# Patient Record
Sex: FEMALE | Race: BLACK OR AFRICAN AMERICAN | NOT HISPANIC OR LATINO | Employment: FULL TIME | URBAN - METROPOLITAN AREA
[De-identification: names, ages, dates, MRNs, and addresses within clinical notes are randomized per-mention and may not be internally consistent; named-entity substitution may affect disease eponyms.]

---

## 2020-02-18 ENCOUNTER — TELEPHONE (OUTPATIENT)
Dept: FAMILY MEDICINE | Facility: CLINIC | Age: 50
End: 2020-02-18

## 2020-02-18 ENCOUNTER — OFFICE VISIT (OUTPATIENT)
Dept: FAMILY MEDICINE | Facility: CLINIC | Age: 50
End: 2020-02-18
Payer: MEDICARE

## 2020-02-18 ENCOUNTER — HOSPITAL ENCOUNTER (OUTPATIENT)
Dept: RADIOLOGY | Facility: HOSPITAL | Age: 50
Discharge: HOME | End: 2020-02-18
Attending: FAMILY MEDICINE
Payer: MEDICARE

## 2020-02-18 VITALS
BODY MASS INDEX: 31.95 KG/M2 | HEIGHT: 63 IN | HEART RATE: 78 BPM | SYSTOLIC BLOOD PRESSURE: 138 MMHG | OXYGEN SATURATION: 99 % | WEIGHT: 180.31 LBS | DIASTOLIC BLOOD PRESSURE: 86 MMHG

## 2020-02-18 DIAGNOSIS — E78.5 HYPERLIPIDEMIA, UNSPECIFIED HYPERLIPIDEMIA TYPE: ICD-10-CM

## 2020-02-18 DIAGNOSIS — Z72.0 TOBACCO USE: ICD-10-CM

## 2020-02-18 DIAGNOSIS — Z12.31 ENCOUNTER FOR SCREENING MAMMOGRAM FOR BREAST CANCER: ICD-10-CM

## 2020-02-18 DIAGNOSIS — Z11.59 ENCOUNTER FOR HEPATITIS C SCREENING TEST FOR LOW RISK PATIENT: ICD-10-CM

## 2020-02-18 DIAGNOSIS — Z79.899 MEDICATION MANAGEMENT: ICD-10-CM

## 2020-02-18 DIAGNOSIS — Z11.4 SCREENING FOR HIV (HUMAN IMMUNODEFICIENCY VIRUS): ICD-10-CM

## 2020-02-18 DIAGNOSIS — Z86.69 HISTORY OF MIGRAINE HEADACHES: ICD-10-CM

## 2020-02-18 DIAGNOSIS — Z86.73 HISTORY OF CEREBROVASCULAR ACCIDENT: ICD-10-CM

## 2020-02-18 DIAGNOSIS — I67.5 MOYAMOYA: Primary | ICD-10-CM

## 2020-02-18 PROBLEM — Z30.2 ENCOUNTER FOR TUBAL LIGATION: Status: ACTIVE | Noted: 2020-02-18

## 2020-02-18 PROCEDURE — 99999 PR PBB SHADOW E&M-NEW PATIENT-LVL IV: ICD-10-PCS | Mod: PBBFAC,,, | Performed by: FAMILY MEDICINE

## 2020-02-18 PROCEDURE — 3008F BODY MASS INDEX DOCD: CPT | Mod: CPTII,S$GLB,, | Performed by: FAMILY MEDICINE

## 2020-02-18 PROCEDURE — 99999 PR PBB SHADOW E&M-NEW PATIENT-LVL IV: CPT | Mod: PBBFAC,,, | Performed by: FAMILY MEDICINE

## 2020-02-18 PROCEDURE — 77067 SCR MAMMO BI INCL CAD: CPT | Mod: TC

## 2020-02-18 PROCEDURE — 99204 OFFICE O/P NEW MOD 45 MIN: CPT | Mod: S$GLB,,, | Performed by: FAMILY MEDICINE

## 2020-02-18 PROCEDURE — 3008F PR BODY MASS INDEX (BMI) DOCUMENTED: ICD-10-PCS | Mod: CPTII,S$GLB,, | Performed by: FAMILY MEDICINE

## 2020-02-18 PROCEDURE — 77067 MAMMO DIGITAL SCREENING BILAT WITH TOMOSYNTHESIS_CAD: ICD-10-PCS | Mod: 26,,, | Performed by: RADIOLOGY

## 2020-02-18 PROCEDURE — 77063 MAMMO DIGITAL SCREENING BILAT WITH TOMOSYNTHESIS_CAD: ICD-10-PCS | Mod: 26,,, | Performed by: RADIOLOGY

## 2020-02-18 PROCEDURE — 77063 BREAST TOMOSYNTHESIS BI: CPT | Mod: 26,,, | Performed by: RADIOLOGY

## 2020-02-18 PROCEDURE — 99204 PR OFFICE/OUTPT VISIT, NEW, LEVL IV, 45-59 MIN: ICD-10-PCS | Mod: S$GLB,,, | Performed by: FAMILY MEDICINE

## 2020-02-18 PROCEDURE — 77067 SCR MAMMO BI INCL CAD: CPT | Mod: 26,,, | Performed by: RADIOLOGY

## 2020-02-18 NOTE — PROGRESS NOTES
"Subjective:       Patient ID: Darwin Beltrán is a 49 y.o. female.    Chief Complaint: Establish Care    50 yo F pt, new to me, with PMH significant for cerebrovascular MoyaMoya diagnosed in 2011. She presents to [facility]. She was last evaluated by a PCP at [facility] 9/2/2014. Per previous pcp, she's had multiple previous strokes. Previously followed by neurology and vascular surgery--pt stopped following up after she was denied disability. States her PCP completed paperwork advising disability status. Previous PCP documented that has a barely noticeable left facial droop which she is self-conscious of and bothers her, right hand contracture and some atrophy, and visual, cognition, and speech difficulties as consequences of prior strokes. Records from her neurologist were thoroughly reviewed including hypercoagulable workup etc. and all was normal other than vascular abnormalities consistent with moyamoya disease. She had no large vessel disease amenable to stent or bypasses and nothing to suggest embolic phenomenon. She was previously treated with plavix 75 mg hs, ASA 81 mg daily, citalopram 20 mg daily, Folic acid, and pantoprazole 40 mg daily. She has not had any of these medications in > 5 years. Her main concern today is migraine headaches---states she was treated with "oral botox" in the past.    Review of Systems   Constitutional: Negative for activity change, appetite change, chills, fever and unexpected weight change.   HENT: Negative for congestion, sneezing and sore throat.    Eyes: Negative for redness, itching and visual disturbance.   Respiratory: Negative for cough, chest tightness, shortness of breath and wheezing.    Cardiovascular: Negative for chest pain.   Gastrointestinal: Negative for abdominal pain, constipation, diarrhea, nausea and vomiting.   Genitourinary: Negative for dysuria, frequency, hematuria, urgency, vaginal bleeding and vaginal discharge.   Skin: Negative for rash.   Neurological: " "Negative for dizziness, syncope and headaches.   Psychiatric/Behavioral: Negative for dysphoric mood and suicidal ideas. The patient is not nervous/anxious.          Past Medical History:   Diagnosis Date    2011       Patient Active Problem List   Diagnosis    Hyperlipidemia    Momo    History of cerebrovascular accident    Tobacco use    History of migraine headaches       Past Surgical History:   Procedure Laterality Date    BILATERAL TUBAL LIGATION      ORIF TIBIA & FIBULA FRACTURES Right 2004    Hardware in Place       Family History   Problem Relation Age of Onset    Breast cancer Mother 57         in 60s    Prostate cancer Father     Hypertension Father     Lung cancer Brother     Colon cancer Brother        Social History     Tobacco Use   Smoking Status Current Every Day Smoker    Types: Cigarettes       Social History     Social History Narrative    Tobacco: Initiated in 20s; 1ppd    EtOH: Occasional; few times per year; has max use of 3 drinks at once    Drug: None    Employment: Unemployed. Receives disability for medical condition ()    Education: Bachelor's Degree     Lives with 3 children              Objective:        Vitals:    20 1115   BP: 138/86   Pulse: 78   SpO2: 99%   Weight: 81.8 kg (180 lb 5.4 oz)   Height: 5' 3" (1.6 m)       Physical Exam   Constitutional: She is oriented to person, place, and time. She appears well-developed and well-nourished. No distress.   HENT:   Head: Normocephalic and atraumatic.   Right Ear: External ear normal.   Left Ear: External ear normal.   Nose: Nose normal.   Mouth/Throat: Oropharynx is clear and moist. No oropharyngeal exudate.   Eyes: Conjunctivae and EOM are normal. No scleral icterus.   Neck: Normal range of motion. Neck supple.   Cardiovascular: Normal rate, regular rhythm and normal heart sounds. Exam reveals no gallop and no friction rub.   No murmur heard.  Pulmonary/Chest: Effort normal " and breath sounds normal. She has no wheezes. She has no rales.   Musculoskeletal: Normal range of motion. She exhibits no edema.   Lymphadenopathy:     She has no cervical adenopathy.   Neurological: She is alert and oriented to person, place, and time. She has normal strength. No cranial nerve deficit or sensory deficit.   Skin: Skin is warm and dry. No rash noted. No erythema.   Psychiatric: She has a normal mood and affect. Her behavior is normal.       Assessment:       1. Moyamoya    2. History of cerebrovascular accident    3. History of migraine headaches    4. Hyperlipidemia, unspecified hyperlipidemia type    5. Tobacco use    6. Screening for HIV (human immunodeficiency virus)    7. Encounter for hepatitis C screening test for low risk patient    8. Medication management    9. Encounter for screening mammogram for breast cancer        Plan:       Darwin was seen today for establish care.    Diagnoses and all orders for this visit:    Moyamoya  -     Ambulatory referral/consult to Neurology; Future  -     Ambulatory referral/consult to Vascular Surgery; Future    History of cerebrovascular accident  -     CBC auto differential; Future  -     Comprehensive metabolic panel; Future  -     Lipid panel; Future    History of migraine headaches    Hyperlipidemia, unspecified hyperlipidemia type    Tobacco use    Screening for HIV (human immunodeficiency virus)  -     HIV 1/2 Ag/Ab (4th Gen); Future    Encounter for hepatitis C screening test for low risk patient  -     Hepatitis C antibody; Future    Medication management  -     CBC auto differential; Future  -     Comprehensive metabolic panel; Future  -     Hemoglobin A1c; Future  -     Lipid panel; Future  -     TSH; Future  -     Vitamin D; Future    Encounter for screening mammogram for breast cancer  -     Cancel: Mammo Digital Screening Bilat; Future    Cynthia  --has had several CVAs in the past dating back to 2013  --She is now receiving disability  (states CVAs were stress-induced; by job)   --Plan for fasting labs as above  --Discuss ASA and Statin initiation on f/u visit  --Refer to vascular and neurology for further evaluation and management. (?) if pt needs DAPT    H/o Migraine Headaches  --Neurology evaluation given concomitant moyamoya disease     Tobacco Use  --Discuss tobacco cessation on f/u visit     HM   Hep C and HIV screens today   Vitamin D screen today   Refer for Mammogram screen today  Discuss Pap/PPSV-23/Tdap on f/u visit   Flu vaccine declined today 2/18/2020     F/U plan pending fasting lab results

## 2020-02-18 NOTE — TELEPHONE ENCOUNTER
Called to schedule referrals to Neurology & Vascular Surgery, left message with a male person to return call

## 2020-02-19 ENCOUNTER — TELEPHONE (OUTPATIENT)
Dept: NEUROLOGY | Facility: CLINIC | Age: 50
End: 2020-02-19

## 2020-02-19 NOTE — TELEPHONE ENCOUNTER
----- Message from Karen Wadsworth sent at 2/19/2020  8:29 AM CST -----  Patient has a referral to Neurology, can you please schedule patient, thank you

## 2020-02-24 ENCOUNTER — TELEPHONE (OUTPATIENT)
Dept: FAMILY MEDICINE | Facility: CLINIC | Age: 50
End: 2020-02-24

## 2020-02-24 NOTE — TELEPHONE ENCOUNTER
Labs 2/18/2020  Hep C and HIV NR     Vitamin D 5  --Needs ergocalciferol 50K U qweek x 16 weeks  --Repeat Vitamin D in 4 months     TSH 0.605    , , HDL 44, .0  Needs high intensity statin. Discuss rosuvastatin 20 mg hs with pt.    A1c 5.1    CMP wnl     CBC wnl    Will discuss results with pt in office  MD Arpan

## 2020-02-26 PROBLEM — Z86.73 HISTORY OF CEREBROVASCULAR ACCIDENT: Status: ACTIVE | Noted: 2020-02-26

## 2020-02-26 PROBLEM — I67.5 MOYAMOYA: Status: ACTIVE | Noted: 2020-02-26

## 2020-02-26 PROBLEM — Z72.0 TOBACCO USE: Status: ACTIVE | Noted: 2020-02-26

## 2020-02-26 PROBLEM — Z86.69 HISTORY OF MIGRAINE HEADACHES: Status: ACTIVE | Noted: 2020-02-26

## 2020-02-26 NOTE — TELEPHONE ENCOUNTER
Called patient to schedule follow up appointment to discuss lab results.  No answer, unable to leave voicemail. Will schedule follow up and send appt reminder to patient.

## 2020-02-27 DIAGNOSIS — Z86.73 HISTORY OF CEREBROVASCULAR ACCIDENT: Primary | ICD-10-CM

## 2020-02-27 DIAGNOSIS — I77.1 STRICTURE OF ARTERY: ICD-10-CM

## 2020-03-12 ENCOUNTER — HOSPITAL ENCOUNTER (OUTPATIENT)
Dept: VASCULAR SURGERY | Facility: CLINIC | Age: 50
Discharge: HOME | End: 2020-03-12
Attending: SURGERY
Payer: MEDICARE

## 2020-03-12 ENCOUNTER — INITIAL CONSULT (OUTPATIENT)
Dept: VASCULAR SURGERY | Facility: CLINIC | Age: 50
End: 2020-03-12
Attending: SURGERY
Payer: MEDICARE

## 2020-03-12 VITALS
HEART RATE: 70 BPM | TEMPERATURE: 99 F | WEIGHT: 174.19 LBS | BODY MASS INDEX: 30.86 KG/M2 | HEIGHT: 63 IN | DIASTOLIC BLOOD PRESSURE: 85 MMHG | SYSTOLIC BLOOD PRESSURE: 140 MMHG

## 2020-03-12 DIAGNOSIS — I67.5 MOYAMOYA: ICD-10-CM

## 2020-03-12 DIAGNOSIS — Z86.73 HISTORY OF CEREBROVASCULAR ACCIDENT: ICD-10-CM

## 2020-03-12 DIAGNOSIS — I77.1 STRICTURE OF ARTERY: ICD-10-CM

## 2020-03-12 DIAGNOSIS — I65.29 ASYMPTOMATIC CAROTID ARTERY STENOSIS, UNSPECIFIED LATERALITY: Primary | ICD-10-CM

## 2020-03-12 PROCEDURE — 99999 PR PBB SHADOW E&M-EST. PATIENT-LVL III: ICD-10-PCS | Mod: PBBFAC,,, | Performed by: SURGERY

## 2020-03-12 PROCEDURE — 99499 RISK ADDL DX/OHS AUDIT: ICD-10-PCS | Mod: S$GLB,,, | Performed by: SURGERY

## 2020-03-12 PROCEDURE — 93880 PR DUPLEX SCAN EXTRACRANIAL,BILAT: ICD-10-PCS | Mod: S$GLB,,, | Performed by: SURGERY

## 2020-03-12 PROCEDURE — 99204 OFFICE O/P NEW MOD 45 MIN: CPT | Mod: S$GLB,,, | Performed by: SURGERY

## 2020-03-12 PROCEDURE — 99499 UNLISTED E&M SERVICE: CPT | Mod: S$GLB,,, | Performed by: SURGERY

## 2020-03-12 PROCEDURE — 3008F PR BODY MASS INDEX (BMI) DOCUMENTED: ICD-10-PCS | Mod: CPTII,S$GLB,, | Performed by: SURGERY

## 2020-03-12 PROCEDURE — 99999 PR PBB SHADOW E&M-EST. PATIENT-LVL III: CPT | Mod: PBBFAC,,, | Performed by: SURGERY

## 2020-03-12 PROCEDURE — 3008F BODY MASS INDEX DOCD: CPT | Mod: CPTII,S$GLB,, | Performed by: SURGERY

## 2020-03-12 PROCEDURE — 93880 EXTRACRANIAL BILAT STUDY: CPT | Mod: S$GLB,,, | Performed by: SURGERY

## 2020-03-12 PROCEDURE — 99204 PR OFFICE/OUTPT VISIT, NEW, LEVL IV, 45-59 MIN: ICD-10-PCS | Mod: S$GLB,,, | Performed by: SURGERY

## 2020-03-12 NOTE — PROGRESS NOTES
[facility]  [facility]    SUBJECTIVE:     HISTORY OF PRESENT ILLNESS:  Darwin Beltrán is a 49 y.o. female with history significant  MoyaMoya disease (dx  at [facility]), CVA (most recent , residual right sided weakness), tobacco abuse, HTN, HLD who has been referred to surgery clinic for evaluation of her moyamoya disease. Patient reports no recent stroke like symptoms. Only complaint is intermittent migraines. She takes Excedrin with migraines, no other medications. She has never been evaluated for carotid stenosis before, per patient.   Current smoker - 1ppd, 17 years    Carotid u/s 3/12/20 showed 1-39% stenosis bilaterally.    MEDICATIONS:  Home Medications:  No current outpatient medications on file prior to visit.     No current facility-administered medications on file prior to visit.        ALLERGIES:    Review of patient's allergies indicates:  No Known Allergies    PAST MEDICAL HISTORY:    Past Medical History:   Diagnosis Date    Moyamoya        SURGICAL HISTORY:  Past Surgical History:   Procedure Laterality Date    BILATERAL TUBAL LIGATION  2009    ORIF TIBIA & FIBULA FRACTURES Right 2004    Hardware in Place       FAMILY HISTORY:  Family History   Problem Relation Age of Onset    Breast cancer Mother 57         in 60s    Prostate cancer Father     Hypertension Father     Lung cancer Brother     Colon cancer Brother        SOCIAL HISTORY:  Social History     Tobacco Use    Smoking status: Current Every Day Smoker     Types: Cigarettes   Substance Use Topics    Alcohol use: Not on file    Drug use: Not on file        REVIEW OF SYSTEMS:  Review of Systems   Constitutional: Negative for activity change, appetite change, chills, diaphoresis, fatigue and unexpected weight change.   Eyes: Negative for photophobia and visual disturbance.   Respiratory: Negative for cough and shortness of breath.    Cardiovascular: Negative for chest pain and palpitations.    Gastrointestinal: Negative for abdominal distention, nausea and vomiting.   Skin: Negative for color change and wound.   Neurological: Positive for weakness (right upper extremity) and headaches. Negative for dizziness.   Hematological: Negative for adenopathy. Does not bruise/bleed easily.       OBJECTIVE:     Most Recent Vitals:  Temp: 99.1 °F (37.3 °C) (03/12/20 0839)  Pulse: 70 (03/12/20 0839)  BP: (!) 140/85 (03/12/20 0839)    PHYSICAL EXAM:  Physical Exam   Constitutional: She is oriented to person, place, and time and well-developed, well-nourished, and in no distress.   HENT:   Head: Normocephalic and atraumatic.   Cardiovascular: Normal rate, regular rhythm and intact distal pulses.   Pulmonary/Chest: Effort normal. No respiratory distress.   Musculoskeletal: Normal range of motion.   Neurological: She is alert and oriented to person, place, and time.   Skin: Skin is warm and dry. She is not diaphoretic. No erythema.       LABORATORY VALUES:  Lab Results   Component Value Date    WBC 7.85 02/18/2020    HGB 13.5 02/18/2020    HCT 43.1 02/18/2020     02/18/2020    HGBA1C 5.1 02/18/2020     Lab Results   Component Value Date     02/18/2020    K 3.9 02/18/2020     02/18/2020    CO2 25 02/18/2020    BUN 8 02/18/2020    CREATININE 0.8 02/18/2020    GLU 78 02/18/2020    CALCIUM 9.6 02/18/2020    AST 11 02/18/2020    ALT 7 (L) 02/18/2020    ALKPHOS 113 02/18/2020    BILITOT 0.3 02/18/2020    PROT 7.3 02/18/2020    ALBUMIN 3.7 02/18/2020     No results found for: INR, PTT  Lab Results   Component Value Date    TSH 0.605 02/18/2020       DIAGNOSTIC STUDIES:  Carotid ultrasound 3/12: No evidence of signficant stenosis bilaterally 1-39$  ASSESSMENT:     Darwin Beltrán is a 49 y.o. female referred for evaluation of carotids in relation to diagnosis of moyamoya and history of CVA. No evidence of carotid stenosis on ultrasound 3/12/20    PLAN:  -No need for vascular surgery intervention. Recommend  continued work up and medical management per PCP.   -Recommend decreasing/stopping smoking, as it can worsen vascular disease   -Follow up PRN    L. Clare Flower MD   General Surgery- PGYIII  [phone]      STAFF ADDENDUM    I have reviewed the relevant data and the resident's assessment and agree with the findings and plan as outlined above.    Jorge Peña MD  Vascular & Endovascular Surgery

## 2020-03-12 NOTE — LETTER
March 12, 2020      Tadeo Pulliam MD  [address]  [address]  [facility] [address]           [facility] - Vascular Surgery  [address]  [place] [address]  Phone: [phone]  Fax: [phone]          Patient: Darwin Beltrán   MR Number: [id number]   YOB: 1970   Date of Visit: 3/12/2020       Dear Dr. Tadeo Pulliam:    Thank you for referring Darwin Beltrán to me for evaluation. Attached you will find relevant portions of my assessment and plan of care.    If you have questions, please do not hesitate to call me. I look forward to following Darwin Beltrán along with you.    Sincerely,    Jorge Peña MD    Enclosure  CC:  No Recipients    If you would like to receive this communication electronically, please contact [email or web address] or [phone] to request more information on [employer] access.    For providers and/or their staff who would like to refer a patient to [facility], please contact us through our one-stop-shop provider referral line, [facility], at [phone].    If you feel you have received this communication in error or would no longer like to receive these types of communications, please e-mail [email or web address]

## 2020-05-26 ENCOUNTER — OFFICE VISIT (OUTPATIENT)
Dept: FAMILY MEDICINE | Facility: CLINIC | Age: 50
End: 2020-05-26
Attending: FAMILY MEDICINE
Payer: MEDICARE

## 2020-05-26 ENCOUNTER — TELEPHONE (OUTPATIENT)
Dept: FAMILY MEDICINE | Facility: CLINIC | Age: 50
End: 2020-05-26

## 2020-05-26 VITALS
DIASTOLIC BLOOD PRESSURE: 86 MMHG | SYSTOLIC BLOOD PRESSURE: 148 MMHG | HEIGHT: 63 IN | BODY MASS INDEX: 31.36 KG/M2 | WEIGHT: 177 LBS | OXYGEN SATURATION: 98 % | HEART RATE: 90 BPM | TEMPERATURE: 98 F

## 2020-05-26 DIAGNOSIS — Z72.0 TOBACCO USE: ICD-10-CM

## 2020-05-26 DIAGNOSIS — B35.3 TINEA PEDIS OF RIGHT FOOT: Primary | ICD-10-CM

## 2020-05-26 PROCEDURE — 3008F BODY MASS INDEX DOCD: CPT | Mod: CPTII,S$GLB,, | Performed by: FAMILY MEDICINE

## 2020-05-26 PROCEDURE — 3008F PR BODY MASS INDEX (BMI) DOCUMENTED: ICD-10-PCS | Mod: CPTII,S$GLB,, | Performed by: FAMILY MEDICINE

## 2020-05-26 PROCEDURE — 99214 PR OFFICE/OUTPT VISIT, EST, LEVL IV, 30-39 MIN: ICD-10-PCS | Mod: S$GLB,,, | Performed by: FAMILY MEDICINE

## 2020-05-26 PROCEDURE — 99999 PR PBB SHADOW E&M-EST. PATIENT-LVL III: ICD-10-PCS | Mod: PBBFAC,,, | Performed by: FAMILY MEDICINE

## 2020-05-26 PROCEDURE — 99214 OFFICE O/P EST MOD 30 MIN: CPT | Mod: S$GLB,,, | Performed by: FAMILY MEDICINE

## 2020-05-26 PROCEDURE — 99999 PR PBB SHADOW E&M-EST. PATIENT-LVL III: CPT | Mod: PBBFAC,,, | Performed by: FAMILY MEDICINE

## 2020-05-26 RX ORDER — VARENICLINE TARTRATE 0.5 (11)-1
KIT ORAL
Qty: 1 PACKAGE | Refills: 0 | Status: SHIPPED | OUTPATIENT
Start: 2020-05-26 | End: 2020-06-20

## 2020-05-26 RX ORDER — CLOTRIMAZOLE AND BETAMETHASONE DIPROPIONATE 10; .64 MG/G; MG/G
CREAM TOPICAL 2 TIMES DAILY
Qty: 45 G | Refills: 1 | Status: SHIPPED | OUTPATIENT
Start: 2020-05-26 | End: 2022-03-09

## 2020-05-26 RX ORDER — TERBINAFINE HYDROCHLORIDE 250 MG/1
250 TABLET ORAL DAILY
Qty: 90 TABLET | Refills: 0 | Status: SHIPPED | OUTPATIENT
Start: 2020-05-26 | End: 2020-08-20

## 2020-05-26 NOTE — PATIENT INSTRUCTIONS
Athletes Foot    Athletes foot (tinea pedis) is caused by a fungal infection in the skin. It affects the skin between the toes, causing cracks in the skin called fissures. It can also affect the bottom of the foot where it causes dry white scales and peeling of the skin. This infection is more likely to occur when the foot is in hot, sweaty socks and shoes for long periods of time. You may feel itching and burning between your toes. This infection is treated with skin creams or medicine taken by mouth.  Home care  The following are general care guidelines:  · It is important to keep the feet dry. Use absorbent cotton socks and change them if they become sweaty. Or wear an open-toe shoe or sandal. Wash the feet at least once a day with soap and water.  · Apply the antifungal cream as prescribed. Some antifungal creams are available without a prescription.  · It may take a week before the rash starts to improve. It can take about 3 to 4 weeks to completely clear. Continue the medicine until the rash is all gone.  · Use over-the-counter antifungal powders or sprays on your feet after exposure to high-risk environments, such as public showers, gyms, and locker rooms. This can help prevent future infections. Wearing appropriate shoes in these situations can help.  Prevention  The following tips may help prevent athletes foot:  · Don't share shoes or socks with someone who has athlete's foot.  · Don't walk barefoot in places where a fungal infection can spread quickly such as locker rooms, showers, and swimming pools.  · Change socks regularly.  · Alternate shoes to assist in drying.  Follow-up care  Follow up with your healthcare provider as recommended if the rash does not improve after 10 days of treatment, or if the rash continues to spread.  When to seek medical care  Get medical attention right away if any of the following occur:  · Fever of 100.4°F (38°C) or higher, or as directed  · Increasing redness or  swelling of the foot  · Infection comes back soon after treatment  · Pus draining from cracks in the skin  Date Last Reviewed: 8/1/2016  © 4727-3425 [employer]. [address] [place] [address], [place] [address]. All rights reserved. This information is not intended as a substitute for professional medical care. Always follow your healthcare professional's instructions.

## 2020-05-26 NOTE — PROGRESS NOTES
Subjective:       Patient ID: Darwin Beltrán is a 49 y.o. female.    Chief Complaint: Foot Problem (right foot)    49 yr old pleasant female with HLD, tobacco use, and other co morbidities presents today for evaluation of right foot rash. Onset long time ago but got worse in the last one month. It oozes. She is been treated for fungal infection in the past. No systemic symptoms. Details as follows -    Tobacco use - she would like to quit    Rash   This is a chronic problem. The current episode started more than 1 month ago. The problem has been gradually worsening since onset. The affected locations include the right foot and right toes. The rash is characterized by dryness, peeling, scaling, redness, swelling and draining. Pertinent negatives include no anorexia, congestion, cough, eye pain, facial edema, fever, nail changes, rhinorrhea, shortness of breath, sore throat or vomiting. Past treatments include nothing. The treatment provided no relief.     Review of Systems   Constitutional: Negative.  Negative for activity change, diaphoresis, fever and unexpected weight change.   HENT: Negative.  Negative for congestion, ear discharge, hearing loss, rhinorrhea, sore throat and voice change.    Eyes: Negative.  Negative for pain, discharge and visual disturbance.   Respiratory: Negative.  Negative for cough, chest tightness, shortness of breath and wheezing.    Cardiovascular: Negative.  Negative for chest pain.   Gastrointestinal: Negative.  Negative for abdominal distention, anal bleeding, anorexia, constipation, nausea and vomiting.   Endocrine: Negative.  Negative for cold intolerance, polydipsia and polyuria.   Genitourinary: Negative.  Negative for decreased urine volume, difficulty urinating, dysuria, frequency, menstrual problem and vaginal pain.   Musculoskeletal: Negative.  Negative for arthralgias, gait problem and myalgias.   Skin: Positive for rash. Negative for color change, nail changes, pallor and  wound.   Allergic/Immunologic: Negative.  Negative for environmental allergies and immunocompromised state.   Neurological: Negative.  Negative for dizziness, tremors, seizures, speech difficulty and headaches.   Hematological: Negative.  Negative for adenopathy. Does not bruise/bleed easily.   Psychiatric/Behavioral: Negative.  Negative for agitation, confusion, decreased concentration, hallucinations, self-injury and suicidal ideas. The patient is not nervous/anxious.        PMH/PSH/FH/SH/MED/ALLERGY reviewed    Objective:       Vitals:    05/26/20 1413   BP: (!) 148/86   Pulse: 90   Temp: 98.2 °F (36.8 °C)       Physical Exam   Constitutional: She is oriented to person, place, and time. She appears well-developed and well-nourished. No distress.   HENT:   Head: Normocephalic and atraumatic.   Right Ear: External ear normal.   Left Ear: External ear normal.   Nose: Nose normal.   Mouth/Throat: Oropharynx is clear and moist. No oropharyngeal exudate.   Eyes: Pupils are equal, round, and reactive to light. Conjunctivae and EOM are normal. Right eye exhibits no discharge. Left eye exhibits no discharge. No scleral icterus.   Neck: Normal range of motion. Neck supple. No JVD present. No tracheal deviation present. No thyromegaly present.   Cardiovascular: Normal rate, regular rhythm, normal heart sounds and intact distal pulses. Exam reveals no gallop and no friction rub.   No murmur heard.  Pulmonary/Chest: Effort normal and breath sounds normal. No stridor. She has no wheezes. She has no rales. She exhibits no tenderness.   Abdominal: Soft. Bowel sounds are normal. She exhibits no distension and no mass. There is no tenderness. There is no rebound and no guarding. No hernia.   Musculoskeletal: Normal range of motion. She exhibits no edema or tenderness.   Lymphadenopathy:     She has no cervical adenopathy.   Neurological: She is alert and oriented to person, place, and time. She has normal reflexes. She displays  normal reflexes. No cranial nerve deficit. She exhibits normal muscle tone. Coordination normal.   Skin: Skin is warm and dry. Rash noted. She is not diaphoretic. There is erythema. No pallor.   Patchy, erythematous, raised, scaly rash with some cracks extending the sole of foot and involving all toes and dorsal foot   Psychiatric: She has a normal mood and affect. Her behavior is normal. Judgment and thought content normal.       Assessment:       1. Tinea pedis of right foot    2. Tobacco use        Plan:           Darwin was seen today for foot problem.    Diagnoses and all orders for this visit:    Tinea pedis of right foot  -     terbinafine HCL (LAMISIL) 250 mg tablet; Take 1 tablet (250 mg total) by mouth once daily.  -     clotrimazole-betamethasone 1-0.05% (LOTRISONE) cream; Apply topically 2 (two) times daily.    Tobacco use  -     varenicline (CHANTIX STARTING MONTH BOX) 0.5 mg (11)- 1 mg (42) tablet; Take one 0.5mg tab by mouth once daily X3 days,then increase to one 0.5mg tab twice daily X4 days,then increase to one 1mg tab twice daily      Tinea pedis  -lamisil x 3 months  -lotrisone as directed    Tobacco use  -chantix    Spent adequate time in obtaining history and explaining differentials    25 minutes spent during this visit of which greater than 50% devoted to face-face counseling and coordination of care regarding diagnosis and management plan    Follow up if symptoms worsen or fail to improve.

## 2020-06-11 ENCOUNTER — TELEPHONE (OUTPATIENT)
Dept: FAMILY MEDICINE | Facility: CLINIC | Age: 50
End: 2020-06-11

## 2020-06-11 NOTE — TELEPHONE ENCOUNTER
Called and notified patient that she can stop medication and monitor symptoms.  Patient verbalized understanding.

## 2020-06-11 NOTE — TELEPHONE ENCOUNTER
Spoke to pt and states that Lamisil 250mg QD is causing blisters on the bottom of her feet. Pt has not taken any Lamisil today. Pls advise.

## 2020-06-11 NOTE — TELEPHONE ENCOUNTER
----- Message from Zuleyma Hernandez sent at 6/11/2020 10:59 AM CDT -----  Contact: [phone]/Self   Patient is requesting to speak with nurse regarding medication terbinafine HCL (LAMISIL) 250 mg tablet not working. Please call and advise.

## 2020-06-22 ENCOUNTER — TELEPHONE (OUTPATIENT)
Dept: FAMILY MEDICINE | Facility: CLINIC | Age: 50
End: 2020-06-22

## 2020-06-22 NOTE — TELEPHONE ENCOUNTER
----- Message from Delilah Rosario sent at 6/22/2020 10:30 AM CDT -----  Contact: [phone]  Pt is requesting a callback in regards to needing to speak with the Nurse.    Please call

## 2020-06-22 NOTE — TELEPHONE ENCOUNTER
Patient states that she was taking Lamisil for athlete's foot.  States blisters began to form on the bottoms of her feet.  Patient offered first available to follow up on symptoms on 7/1/2020.  Patient will go to  to be seen sooner.

## 2020-06-23 ENCOUNTER — OFFICE VISIT (OUTPATIENT)
Dept: URGENT CARE | Facility: CLINIC | Age: 50
End: 2020-06-23
Payer: MEDICARE

## 2020-06-23 VITALS
BODY MASS INDEX: 30.22 KG/M2 | DIASTOLIC BLOOD PRESSURE: 95 MMHG | SYSTOLIC BLOOD PRESSURE: 157 MMHG | OXYGEN SATURATION: 98 % | HEART RATE: 85 BPM | HEIGHT: 64 IN | WEIGHT: 177 LBS | TEMPERATURE: 98 F

## 2020-06-23 DIAGNOSIS — L30.1 DYSHIDROTIC ECZEMA: Primary | ICD-10-CM

## 2020-06-23 PROCEDURE — 99213 PR OFFICE/OUTPT VISIT, EST, LEVL III, 20-29 MIN: ICD-10-PCS | Mod: S$GLB,,, | Performed by: NURSE PRACTITIONER

## 2020-06-23 PROCEDURE — 99213 OFFICE O/P EST LOW 20 MIN: CPT | Mod: S$GLB,,, | Performed by: NURSE PRACTITIONER

## 2020-06-23 RX ORDER — DOXYCYCLINE 100 MG/1
100 CAPSULE ORAL 2 TIMES DAILY
Qty: 20 CAPSULE | Refills: 0 | Status: SHIPPED | OUTPATIENT
Start: 2020-06-23 | End: 2020-07-03

## 2020-06-23 NOTE — PROGRESS NOTES
"Subjective:       Patient ID: Darwin Beltrán is a 49 y.o. female.    Vitals:  height is 5' 4" (1.626 m) and weight is 80.3 kg (177 lb). Her temperature is 97.5 °F (36.4 °C). Her blood pressure is 157/95 (abnormal) and her pulse is 85. Her oxygen saturation is 98%.     Chief Complaint: Recurrent Skin Infections    Ambulatory with c/o infection to feet and hands for one year. Her pcp was treating with terbinafine and clotrimazole with steroid topical but not improving. She states her feet hurt so bad this weekend. She tried to get in with pcp but no appt. Available.     Nail Problem  This is a recurrent problem. The current episode started more than 1 year ago. The problem has been gradually worsening. Pertinent negatives include no arthralgias, chest pain, chills, headaches, joint swelling, myalgias, nausea, vertigo or weakness. She has tried nothing (Terbinafine and Clotrimazolw Betamethasone Cream ) for the symptoms. The treatment provided mild relief.       Constitution: Negative for chills.   Neck: Negative for painful lymph nodes.   Cardiovascular: Negative for chest pain and leg swelling.   Eyes: Negative for double vision and blurred vision.   Gastrointestinal: Negative for nausea.   Genitourinary: Negative for dysuria, frequency, urgency and history of kidney stones.   Musculoskeletal: Negative for joint pain, joint swelling, muscle cramps and muscle ache.   Skin: Positive for erythema. Negative for color change, pale and bruising.   Allergic/Immunologic: Negative for seasonal allergies.   Neurological: Negative for dizziness, history of vertigo, light-headedness, passing out and headaches.   Hematologic/Lymphatic: Negative for swollen lymph nodes.   Psychiatric/Behavioral: Negative for nervous/anxious, sleep disturbance and depression. The patient is not nervous/anxious.        Objective:      Physical Exam   Constitutional: She is oriented to person, place, and time. She appears well-developed. She is " cooperative.  Non-toxic appearance. She does not appear ill. No distress.   HENT:   Head: Normocephalic and atraumatic.   Right Ear: Hearing, tympanic membrane, external ear and ear canal normal.   Left Ear: Hearing, tympanic membrane, external ear and ear canal normal.   Nose: Nose normal. No mucosal edema, rhinorrhea or nasal deformity. No epistaxis. Right sinus exhibits no maxillary sinus tenderness and no frontal sinus tenderness. Left sinus exhibits no maxillary sinus tenderness and no frontal sinus tenderness.   Mouth/Throat: Uvula is midline, oropharynx is clear and moist and mucous membranes are normal. No trismus in the jaw. Normal dentition. No uvula swelling. No posterior oropharyngeal erythema.   Eyes: Conjunctivae and lids are normal. Right eye exhibits no discharge. Left eye exhibits no discharge. No scleral icterus.   Neck: Trachea normal, normal range of motion, full passive range of motion without pain and phonation normal. Neck supple.   Cardiovascular: Normal rate, regular rhythm, normal heart sounds and normal pulses.   Pulses:       Dorsalis pedis pulses are 2+ on the right side and 2+ on the left side.        Posterior tibial pulses are 2+ on the right side and 2+ on the left side.   Pulmonary/Chest: Effort normal and breath sounds normal. No respiratory distress.   Abdominal: Soft. Normal appearance and bowel sounds are normal. She exhibits no distension, no pulsatile midline mass and no mass. There is no abdominal tenderness.   Musculoskeletal: Normal range of motion.         General: No deformity.   Neurological: She is alert and oriented to person, place, and time. She exhibits normal muscle tone. Coordination normal.   Reflex Scores:       Achilles reflexes are 4+ on the right side and 4+ on the left side.  Skin: Skin is warm, dry, intact, not diaphoretic, not pale and rash. Lesions:  erythema  Psychiatric: Her speech is normal and behavior is normal. Judgment and thought content normal.    Nursing note and vitals reviewed.      rash as per pictures. Dry flaky areas with small bulla between toes and plantar aspect of feet with some skin breakdown.  I suspect a dyshidrotic eczema vs fungus. Will cover with abx for skin breakdown              Assessment:       1. Dyshidrotic eczema        Plan:         Dyshidrotic eczema  -     doxycycline (VIBRAMYCIN) 100 MG Cap; Take 1 capsule (100 mg total) by mouth 2 (two) times daily. for 10 days  Dispense: 20 capsule; Refill: 0  -     Ambulatory referral/consult to Dermatology          Patient Instructions     Atopic Dermatitis (Adult)  Atopic dermatitis is a dry, itchy, red rash. Its also called eczema. The rash is chronic, or ongoing. It can come and go over time. The disease is often passed down in families. It causes a problem with the skin barrier that makes the skin more sensitive to the environment and other factors. The increased skin sensitivity causes an itch, which causes scratching. Scratching can worsen the itching or also break the skin. This can put the skin at risk of infection.  The condition is most common in people with asthma, hay fever, hives, or dry or sensitive skin. The rash may be caused by extreme heat or heavy sweating. Skin irritants can cause the rash to flare up. These can include wool or silk clothing, grease, oils, some medicines, and harsh soaps and detergents. Emotional stress can also be a trigger.  Treatment is done to relieve the itching and inflammation of the skin.  Home care  Follow these tips to care for your condition:  · Keep the areas of rash clean by bathing at least every other day. Use lukewarm water to bathe. Dont use hot water, which can dry out the skin.  · Dont use soaps with strong detergents. Use mild soaps made for sensitive skin.  · Apply a cream or ointment to damp skin right after bathing.  · Avoid things that irritate your skin. Wear absorbent, soft fabrics next to the skin rather than rough or scratchy  materials.  · Use mild laundry soap free of scents and perfumes. Make sure to rinse all the soap out of your clothes.  · Treat any skin infection as directed.  · Use oral diphenhydramine to help reduce itching. This is an antihistamine you can buy at drug and grocery stores. It can make you sleepy, so use lower doses during the daytime. Or you can use loratadine. This is an antihistamine that will not make you sleepy. Do not use diphenhydramine if you have glaucoma or have trouble urinating due to an enlarged prostate.  Follow-up care  See your healthcare provider, or as advised. If your symptoms dont get better or if they get worse in the next 7 days, make an appointment with your healthcare provider.  When to seek medical advice  Call your healthcare provider right away  if any of these occur:  · Increasing area of redness or pain in the skin  · Yellow crusts or wet drainage from the rash  · Fever of 100.4°F (38°C) or higher, or as directed by your healthcare provider  Date Last Reviewed: 9/1/2016 © 2000-2017 [employer]. [address] [place] [address]. All rights reserved. This information is not intended as a substitute for professional medical care. Always follow your healthcare professional's instructions.        Atopic Dermatitis (Adult)  Atopic dermatitis is a dry, itchy, red rash. Its also called eczema. The rash is chronic, or ongoing. It can come and go over time. The disease is often passed down in families. It causes a problem with the skin barrier that makes the skin more sensitive to the environment and other factors. The increased skin sensitivity causes an itch, which causes scratching. Scratching can worsen the itching or also break the skin. This can put the skin at risk of infection.  The condition is most common in people with asthma, hay fever, hives, or dry or sensitive skin. The rash may be caused by extreme heat or heavy sweating. Skin irritants can cause the rash  to flare up. These can include wool or silk clothing, grease, oils, some medicines, and harsh soaps and detergents. Emotional stress can also be a trigger.  Treatment is done to relieve the itching and inflammation of the skin.  Home care  Follow these tips to care for your condition:  · Keep the areas of rash clean by bathing at least every other day. Use lukewarm water to bathe. Dont use hot water, which can dry out the skin.  · Dont use soaps with strong detergents. Use mild soaps made for sensitive skin.  · Apply a cream or ointment to damp skin right after bathing.  · Avoid things that irritate your skin. Wear absorbent, soft fabrics next to the skin rather than rough or scratchy materials.  · Use mild laundry soap free of scents and perfumes. Make sure to rinse all the soap out of your clothes.  · Treat any skin infection as directed.  · Use oral diphenhydramine to help reduce itching. This is an antihistamine you can buy at drug and grocery stores. It can make you sleepy, so use lower doses during the daytime. Or you can use loratadine. This is an antihistamine that will not make you sleepy. Do not use diphenhydramine if you have glaucoma or have trouble urinating due to an enlarged prostate.  Follow-up care  See your healthcare provider, or as advised. If your symptoms dont get better or if they get worse in the next 7 days, make an appointment with your healthcare provider.  When to seek medical advice  Call your healthcare provider right away  if any of these occur:  · Increasing area of redness or pain in the skin  · Yellow crusts or wet drainage from the rash  · Fever of 100.4°F (38°C) or higher, or as directed by your healthcare provider  Date Last Reviewed: 9/1/2016  © 6407-2334 [employer]. [address] [place] [address], [place] [address]. All rights reserved. This information is not intended as a substitute for professional medical care. Always follow your healthcare professional's  instructions.        Atopic Dermatitis (Adult)  Atopic dermatitis is a dry, itchy, red rash. Its also called eczema. The rash is chronic, or ongoing. It can come and go over time. The disease is often passed down in families. It causes a problem with the skin barrier that makes the skin more sensitive to the environment and other factors. The increased skin sensitivity causes an itch, which causes scratching. Scratching can worsen the itching or also break the skin. This can put the skin at risk of infection.  The condition is most common in people with asthma, hay fever, hives, or dry or sensitive skin. The rash may be caused by extreme heat or heavy sweating. Skin irritants can cause the rash to flare up. These can include wool or silk clothing, grease, oils, some medicines, and harsh soaps and detergents. Emotional stress can also be a trigger.  Treatment is done to relieve the itching and inflammation of the skin.  Home care  Follow these tips to care for your condition:  · Keep the areas of rash clean by bathing at least every other day. Use lukewarm water to bathe. Dont use hot water, which can dry out the skin.  · Dont use soaps with strong detergents. Use mild soaps made for sensitive skin.  · Apply a cream or ointment to damp skin right after bathing.  · Avoid things that irritate your skin. Wear absorbent, soft fabrics next to the skin rather than rough or scratchy materials.  · Use mild laundry soap free of scents and perfumes. Make sure to rinse all the soap out of your clothes.  · Treat any skin infection as directed.  · Use oral diphenhydramine to help reduce itching. This is an antihistamine you can buy at drug and grocery stores. It can make you sleepy, so use lower doses during the daytime. Or you can use loratadine. This is an antihistamine that will not make you sleepy. Do not use diphenhydramine if you have glaucoma or have trouble urinating due to an enlarged prostate.  Follow-up care  See  your healthcare provider, or as advised. If your symptoms dont get better or if they get worse in the next 7 days, make an appointment with your healthcare provider.  When to seek medical advice  Call your healthcare provider right away  if any of these occur:  · Increasing area of redness or pain in the skin  · Yellow crusts or wet drainage from the rash  · Fever of 100.4°F (38°C) or higher, or as directed by your healthcare provider  Date Last Reviewed: 9/1/2016 © 2000-2017 [employer]. [address] [place] [address], [place] [address]. All rights reserved. This information is not intended as a substitute for professional medical care. Always follow your healthcare professional's instructions.

## 2020-06-23 NOTE — PATIENT INSTRUCTIONS
Atopic Dermatitis (Adult)  Atopic dermatitis is a dry, itchy, red rash. Its also called eczema. The rash is chronic, or ongoing. It can come and go over time. The disease is often passed down in families. It causes a problem with the skin barrier that makes the skin more sensitive to the environment and other factors. The increased skin sensitivity causes an itch, which causes scratching. Scratching can worsen the itching or also break the skin. This can put the skin at risk of infection.  The condition is most common in people with asthma, hay fever, hives, or dry or sensitive skin. The rash may be caused by extreme heat or heavy sweating. Skin irritants can cause the rash to flare up. These can include wool or silk clothing, grease, oils, some medicines, and harsh soaps and detergents. Emotional stress can also be a trigger.  Treatment is done to relieve the itching and inflammation of the skin.  Home care  Follow these tips to care for your condition:  · Keep the areas of rash clean by bathing at least every other day. Use lukewarm water to bathe. Dont use hot water, which can dry out the skin.  · Dont use soaps with strong detergents. Use mild soaps made for sensitive skin.  · Apply a cream or ointment to damp skin right after bathing.  · Avoid things that irritate your skin. Wear absorbent, soft fabrics next to the skin rather than rough or scratchy materials.  · Use mild laundry soap free of scents and perfumes. Make sure to rinse all the soap out of your clothes.  · Treat any skin infection as directed.  · Use oral diphenhydramine to help reduce itching. This is an antihistamine you can buy at drug and grocery stores. It can make you sleepy, so use lower doses during the daytime. Or you can use loratadine. This is an antihistamine that will not make you sleepy. Do not use diphenhydramine if you have glaucoma or have trouble urinating due to an enlarged prostate.  Follow-up care  See your healthcare  provider, or as advised. If your symptoms dont get better or if they get worse in the next 7 days, make an appointment with your healthcare provider.  When to seek medical advice  Call your healthcare provider right away  if any of these occur:  · Increasing area of redness or pain in the skin  · Yellow crusts or wet drainage from the rash  · Fever of 100.4°F (38°C) or higher, or as directed by your healthcare provider  Date Last Reviewed: 9/1/2016 © 2000-2017 [employer]. [address] [place] [address]. All rights reserved. This information is not intended as a substitute for professional medical care. Always follow your healthcare professional's instructions.        Atopic Dermatitis (Adult)  Atopic dermatitis is a dry, itchy, red rash. Its also called eczema. The rash is chronic, or ongoing. It can come and go over time. The disease is often passed down in families. It causes a problem with the skin barrier that makes the skin more sensitive to the environment and other factors. The increased skin sensitivity causes an itch, which causes scratching. Scratching can worsen the itching or also break the skin. This can put the skin at risk of infection.  The condition is most common in people with asthma, hay fever, hives, or dry or sensitive skin. The rash may be caused by extreme heat or heavy sweating. Skin irritants can cause the rash to flare up. These can include wool or silk clothing, grease, oils, some medicines, and harsh soaps and detergents. Emotional stress can also be a trigger.  Treatment is done to relieve the itching and inflammation of the skin.  Home care  Follow these tips to care for your condition:  · Keep the areas of rash clean by bathing at least every other day. Use lukewarm water to bathe. Dont use hot water, which can dry out the skin.  · Dont use soaps with strong detergents. Use mild soaps made for sensitive skin.  · Apply a cream or ointment to damp skin  right after bathing.  · Avoid things that irritate your skin. Wear absorbent, soft fabrics next to the skin rather than rough or scratchy materials.  · Use mild laundry soap free of scents and perfumes. Make sure to rinse all the soap out of your clothes.  · Treat any skin infection as directed.  · Use oral diphenhydramine to help reduce itching. This is an antihistamine you can buy at drug and grocery stores. It can make you sleepy, so use lower doses during the daytime. Or you can use loratadine. This is an antihistamine that will not make you sleepy. Do not use diphenhydramine if you have glaucoma or have trouble urinating due to an enlarged prostate.  Follow-up care  See your healthcare provider, or as advised. If your symptoms dont get better or if they get worse in the next 7 days, make an appointment with your healthcare provider.  When to seek medical advice  Call your healthcare provider right away  if any of these occur:  · Increasing area of redness or pain in the skin  · Yellow crusts or wet drainage from the rash  · Fever of 100.4°F (38°C) or higher, or as directed by your healthcare provider  Date Last Reviewed: 9/1/2016  © 6865-5956 [employer]. [address] [place] [address], [place] [address]. All rights reserved. This information is not intended as a substitute for professional medical care. Always follow your healthcare professional's instructions.        Atopic Dermatitis (Adult)  Atopic dermatitis is a dry, itchy, red rash. Its also called eczema. The rash is chronic, or ongoing. It can come and go over time. The disease is often passed down in families. It causes a problem with the skin barrier that makes the skin more sensitive to the environment and other factors. The increased skin sensitivity causes an itch, which causes scratching. Scratching can worsen the itching or also break the skin. This can put the skin at risk of infection.  The condition is most common in people with  asthma, hay fever, hives, or dry or sensitive skin. The rash may be caused by extreme heat or heavy sweating. Skin irritants can cause the rash to flare up. These can include wool or silk clothing, grease, oils, some medicines, and harsh soaps and detergents. Emotional stress can also be a trigger.  Treatment is done to relieve the itching and inflammation of the skin.  Home care  Follow these tips to care for your condition:  · Keep the areas of rash clean by bathing at least every other day. Use lukewarm water to bathe. Dont use hot water, which can dry out the skin.  · Dont use soaps with strong detergents. Use mild soaps made for sensitive skin.  · Apply a cream or ointment to damp skin right after bathing.  · Avoid things that irritate your skin. Wear absorbent, soft fabrics next to the skin rather than rough or scratchy materials.  · Use mild laundry soap free of scents and perfumes. Make sure to rinse all the soap out of your clothes.  · Treat any skin infection as directed.  · Use oral diphenhydramine to help reduce itching. This is an antihistamine you can buy at drug and grocery stores. It can make you sleepy, so use lower doses during the daytime. Or you can use loratadine. This is an antihistamine that will not make you sleepy. Do not use diphenhydramine if you have glaucoma or have trouble urinating due to an enlarged prostate.  Follow-up care  See your healthcare provider, or as advised. If your symptoms dont get better or if they get worse in the next 7 days, make an appointment with your healthcare provider.  When to seek medical advice  Call your healthcare provider right away  if any of these occur:  · Increasing area of redness or pain in the skin  · Yellow crusts or wet drainage from the rash  · Fever of 100.4°F (38°C) or higher, or as directed by your healthcare provider  Date Last Reviewed: 9/1/2016  © 5469-5234 [employer]. [address] [place] [address], [place] [address]. All rights  reserved. This information is not intended as a substitute for professional medical care. Always follow your healthcare professional's instructions.

## 2020-07-21 ENCOUNTER — TELEPHONE (OUTPATIENT)
Dept: INTERNAL MEDICINE | Facility: CLINIC | Age: 50
End: 2020-07-21

## 2020-07-21 DIAGNOSIS — Z72.0 TOBACCO USE: Primary | ICD-10-CM

## 2020-07-21 RX ORDER — VARENICLINE TARTRATE 1 MG/1
1 TABLET, FILM COATED ORAL 2 TIMES DAILY
Qty: 60 TABLET | Refills: 2 | Status: SHIPPED | OUTPATIENT
Start: 2020-07-21 | End: 2020-10-20

## 2020-07-21 NOTE — TELEPHONE ENCOUNTER
----- Message from Adan Sullivan sent at 7/21/2020  3:07 PM CDT -----  Regarding: Medication  Contact: Self/ [phone]  Patient requesting to speak with you regarding her medication CHANTIX STARTING MONTH BOX 0.5 mg (11)- 1 mg (42) tablet. She says it is not supposed to be a starter box but instead the monthly box. Please call.

## 2020-07-28 ENCOUNTER — PATIENT OUTREACH (OUTPATIENT)
Dept: ADMINISTRATIVE | Facility: OTHER | Age: 50
End: 2020-07-28

## 2020-07-28 NOTE — PROGRESS NOTES
Requested updates within Care Everywhere.  Patient's chart was reviewed for overdue JOSELIN topics.  Immunizations reconciled.    Orders placed:n/a  Tasked appts:n/a  Labs Linked:n/a

## 2020-07-29 ENCOUNTER — OFFICE VISIT (OUTPATIENT)
Dept: DERMATOLOGY | Facility: CLINIC | Age: 50
End: 2020-07-29
Payer: MEDICARE

## 2020-07-29 DIAGNOSIS — L30.8 PSORIASIFORM DERMATITIS: Primary | ICD-10-CM

## 2020-07-29 PROCEDURE — 87220 PR  TISSUE EXAM BY KOH: ICD-10-PCS | Mod: S$GLB,,, | Performed by: DERMATOLOGY

## 2020-07-29 PROCEDURE — 99999 PR PBB SHADOW E&M-EST. PATIENT-LVL II: CPT | Mod: PBBFAC,,, | Performed by: DERMATOLOGY

## 2020-07-29 PROCEDURE — 99202 PR OFFICE/OUTPT VISIT, NEW, LEVL II, 15-29 MIN: ICD-10-PCS | Mod: 25,S$GLB,, | Performed by: DERMATOLOGY

## 2020-07-29 PROCEDURE — 87220 TISSUE EXAM FOR FUNGI: CPT | Mod: S$GLB,,, | Performed by: DERMATOLOGY

## 2020-07-29 PROCEDURE — 99999 PR PBB SHADOW E&M-EST. PATIENT-LVL II: ICD-10-PCS | Mod: PBBFAC,,, | Performed by: DERMATOLOGY

## 2020-07-29 PROCEDURE — 99202 OFFICE O/P NEW SF 15 MIN: CPT | Mod: 25,S$GLB,, | Performed by: DERMATOLOGY

## 2020-07-29 RX ORDER — CLOBETASOL PROPIONATE 0.5 MG/G
OINTMENT TOPICAL
Qty: 60 G | Refills: 3 | Status: SHIPPED | OUTPATIENT
Start: 2020-07-29 | End: 2022-03-09

## 2020-07-29 NOTE — PATIENT INSTRUCTIONS
Psoriasiform dermatitis (Psoriasis vs PPK)  -     clobetasol 0.05% (TEMOVATE) 0.05 % Oint; AAA bid  Dispense: 60 g; Refill: 3  Today's Plan:    At night:  1st psoriasis moisturizing cream by cerave to affected areas or urea cream  2nd steroid cream/ointment toaffected areas  3rd occlusion to affected areas with wet wraps (damp pillow case or old t-shirt and wrap around) or cotton socks   AM:  1st Psoriasis moisturizing cream or urea cream 40%   2nd steroid cream/ointment then cover with white cotton socks      Throughout the day use psoriasis moisturizing cream or cerave SA

## 2020-07-29 NOTE — PROGRESS NOTES
Subjective:       Patient ID:  Darwin Beltrán is a 49 y.o. female who presents for   Chief Complaint   Patient presents with    Fungus     both feet     Fungus - Initial  Affected locations: right foot and left foot  Duration: 10 months  Signs / symptoms: asymptomatic  Severity: mild  Timing: constant  Aggravated by: nothing  Relieving factors/Treatments tried: nothing  Improvement on treatment: no relief        Review of Systems   Constitutional: Negative for fever, chills and fatigue.   Skin: Negative for daily sunscreen use, recent sunburn and wears hat.   Hematologic/Lymphatic: Does not bruise/bleed easily.        Objective:    Physical Exam   Constitutional: She appears well-developed and well-nourished.   Neurological: She is alert and oriented to person, place, and time.   Psychiatric: She has a normal mood and affect.   Skin:   Areas Examined (abnormalities noted in diagram):   RLE Inspected  LLE Inspection Performed  Nails and Digits Inspection Performed             Diagram Legend     Erythematous scaling macule/papule c/w actinic keratosis       Vascular papule c/w angioma      Pigmented verrucoid papule/plaque c/w seborrheic keratosis      Yellow umbilicated papule c/w sebaceous hyperplasia      Irregularly shaped tan macule c/w lentigo     1-2 mm smooth white papules consistent with Milia      Movable subcutaneous cyst with punctum c/w epidermal inclusion cyst      Subcutaneous movable cyst c/w pilar cyst      Firm pink to brown papule c/w dermatofibroma      Pedunculated fleshy papule(s) c/w skin tag(s)      Evenly pigmented macule c/w junctional nevus     Mildly variegated pigmented, slightly irregular-bordered macule c/w mildly atypical nevus      Flesh colored to evenly pigmented papule c/w intradermal nevus       Pink pearly papule/plaque c/w basal cell carcinoma      Erythematous hyperkeratotic cursted plaque c/w SCC      Surgical scar with no sign of skin cancer recurrence      Open and closed  comedones      Inflammatory papules and pustules      Verrucoid papule consistent consistent with wart     Erythematous eczematous patches and plaques     Dystrophic onycholytic nail with subungual debris c/w onychomycosis     Umbilicated papule    Erythematous-base heme-crusted tan verrucoid plaque consistent with inflamed seborrheic keratosis     Erythematous Silvery Scaling Plaque c/w Psoriasis     See annotation      Assessment / Plan:        Psoriasiform dermatitis (Psoriasis vs PPK)- denies joint pain, had on fingers before? If same rash, denies other body parts  -KOH  -     clobetasol 0.05% (TEMOVATE) 0.05 % Oint; AAA bid  Dispense: 60 g; Refill: 3  Today's Plan:    At night:  1st psoriasis moisturizing cream by cerave to affected areas or urea cream  2nd steroid cream/ointment toaffected areas  3rd occlusion to affected areas with wet wraps (damp pillow case or old t-shirt and wrap around)     AM:  1st Psoriasis moisturizing cream or urea cream 40%  2nd steroid cream/ointment then cover with white cotton socks      Throughout the day use psoriasis moisturizing cream or cerave SA             Follow up in about 4 months (around 11/29/2020).

## 2020-07-29 NOTE — LETTER
July 29, 2020      Philomena Burrell, FN  [address] [facility] [place] [address]           [facility]  [address]  [place] [address]  Phone: [phone]  Fax: [phone]          Patient: Darwin Beltrán   MR Number: [id number]   YOB: 1970   Date of Visit: 7/29/2020       Dear Philomena Burrell:    Thank you for referring Darwin Beltrán to me for evaluation. Attached you will find relevant portions of my assessment and plan of care.    If you have questions, please do not hesitate to call me. I look forward to following Darwin Beltrán along with you.    Sincerely,    Eden Mcfarland MD    Enclosure  CC:  No Recipients    If you would like to receive this communication electronically, please contact [email or web address] or [phone] to request more information on [employer] Link access.    For providers and/or their staff who would like to refer a patient to [facility], please contact us through our one-stop-shop provider referral line, [facility], at [phone].    If you feel you have received this communication in error or would no longer like to receive these types of communications, please e-mail [email or web address]

## 2020-09-09 ENCOUNTER — TELEPHONE (OUTPATIENT)
Dept: FAMILY MEDICINE | Facility: CLINIC | Age: 50
End: 2020-09-09

## 2020-09-09 NOTE — TELEPHONE ENCOUNTER
----- Message from Elodia Osman sent at 9/9/2020  9:28 AM CDT -----  Type:  Needs Medical Advice    Who Called: self  Reason:Requesting to speak with nurse   Would the patient rather a call back or a response via [employer]? call  Best Call Back Number: [phone]  Additional Information: none

## 2020-09-10 ENCOUNTER — TELEPHONE (OUTPATIENT)
Dept: FAMILY MEDICINE | Facility: CLINIC | Age: 50
End: 2020-09-10

## 2020-09-10 NOTE — TELEPHONE ENCOUNTER
----- Message from Elodia Osman sent at 9/10/2020  1:29 PM CDT -----  Type:  Needs Medical Advice    Who Called: self  Reason:Patient said no one ever called her back but I see where Crissy tried to. She said she needs to be seen asap  Would the patient rather a call back or a response via [employer]? call  Best Call Back Number: [phone]  Additional Information: none

## 2020-09-25 ENCOUNTER — TELEPHONE (OUTPATIENT)
Dept: FAMILY MEDICINE | Facility: CLINIC | Age: 50
End: 2020-09-25

## 2020-09-25 NOTE — TELEPHONE ENCOUNTER
----- Message from Marly Sheets sent at 9/25/2020  9:53 AM CDT -----  Regarding: sooner  Contact: [phone]/self  Type:  Sooner Apoointment Request    Caller is requesting a sooner appointment.    Name of Caller: self  When is the first available appointment?   Symptoms: Migraine headaches  Would the patient rather a call back or a response via [facility]?  call  Best Call Back Number: JAZMIN MOHAN [[phone]]  Additional Information:

## 2020-09-25 NOTE — TELEPHONE ENCOUNTER
Attempted to call patient again.  Unfortunately I am unable to get successful call.  No ringing, will try to send [employer]t message to assist patient.

## 2020-10-05 ENCOUNTER — PATIENT MESSAGE (OUTPATIENT)
Dept: ADMINISTRATIVE | Facility: HOSPITAL | Age: 50
End: 2020-10-05

## 2020-11-09 ENCOUNTER — OFFICE VISIT (OUTPATIENT)
Dept: FAMILY MEDICINE | Facility: CLINIC | Age: 50
End: 2020-11-09
Payer: MEDICARE

## 2020-11-09 VITALS
BODY MASS INDEX: 31.62 KG/M2 | HEIGHT: 64 IN | WEIGHT: 185.19 LBS | SYSTOLIC BLOOD PRESSURE: 156 MMHG | DIASTOLIC BLOOD PRESSURE: 96 MMHG | OXYGEN SATURATION: 99 % | HEART RATE: 77 BPM

## 2020-11-09 DIAGNOSIS — E78.5 DYSLIPIDEMIA: ICD-10-CM

## 2020-11-09 DIAGNOSIS — I67.5 MOYAMOYA: ICD-10-CM

## 2020-11-09 DIAGNOSIS — Z72.0 TOBACCO USE: ICD-10-CM

## 2020-11-09 DIAGNOSIS — R03.0 ELEVATED BLOOD PRESSURE READING IN OFFICE WITHOUT DIAGNOSIS OF HYPERTENSION: ICD-10-CM

## 2020-11-09 DIAGNOSIS — E55.9 VITAMIN D DEFICIENCY: ICD-10-CM

## 2020-11-09 DIAGNOSIS — G43.709 CHRONIC MIGRAINE WITHOUT AURA WITHOUT STATUS MIGRAINOSUS, NOT INTRACTABLE: Primary | ICD-10-CM

## 2020-11-09 PROCEDURE — 3008F PR BODY MASS INDEX (BMI) DOCUMENTED: ICD-10-PCS | Mod: CPTII,S$GLB,, | Performed by: FAMILY MEDICINE

## 2020-11-09 PROCEDURE — 3008F BODY MASS INDEX DOCD: CPT | Mod: CPTII,S$GLB,, | Performed by: FAMILY MEDICINE

## 2020-11-09 PROCEDURE — 99499 RISK ADDL DX/OHS AUDIT: ICD-10-PCS | Mod: S$GLB,,, | Performed by: FAMILY MEDICINE

## 2020-11-09 PROCEDURE — 99214 PR OFFICE/OUTPT VISIT, EST, LEVL IV, 30-39 MIN: ICD-10-PCS | Mod: S$GLB,,, | Performed by: FAMILY MEDICINE

## 2020-11-09 PROCEDURE — 99999 PR PBB SHADOW E&M-EST. PATIENT-LVL IV: ICD-10-PCS | Mod: PBBFAC,,, | Performed by: FAMILY MEDICINE

## 2020-11-09 PROCEDURE — 99214 OFFICE O/P EST MOD 30 MIN: CPT | Mod: S$GLB,,, | Performed by: FAMILY MEDICINE

## 2020-11-09 PROCEDURE — 99999 PR PBB SHADOW E&M-EST. PATIENT-LVL IV: CPT | Mod: PBBFAC,,, | Performed by: FAMILY MEDICINE

## 2020-11-09 PROCEDURE — 99499 UNLISTED E&M SERVICE: CPT | Mod: S$GLB,,, | Performed by: FAMILY MEDICINE

## 2020-11-09 RX ORDER — NAPROXEN 500 MG/1
500 TABLET ORAL 2 TIMES DAILY PRN
Qty: 60 TABLET | Refills: 1 | Status: SHIPPED | OUTPATIENT
Start: 2020-11-09 | End: 2021-01-06

## 2020-11-09 RX ORDER — PROPRANOLOL HYDROCHLORIDE 20 MG/1
20 TABLET ORAL 2 TIMES DAILY
Qty: 60 TABLET | Refills: 1 | Status: SHIPPED | OUTPATIENT
Start: 2020-11-09 | End: 2020-12-03

## 2020-11-09 NOTE — PROGRESS NOTES
"Subjective:       Patient ID: Darwin Beltrán is a 50 y.o. female.    Chief Complaint: Migraine    51 yo F, established pt of mine (last evaluated 2/18/2020) with PMH significant for (?) multiple CVAs and cerebrovascular MoyaMoya diagnosed in 2011. She presents with complaint of severe migraine headaches.  Patient states that she has been experiencing daily headaches for the past 1 week. Patient states that she usually develops sensitivity to bright light which is followed by development of a generalized headache. Patient says she is unable to describe the pain. Reports nausea and irritability with headaches. No phonophobia. No vomiting. No numbness, tingling, or weakness to the extremities. No associated neck pain. Has some improvement with excedrin migraine.  Pt states she experiences a headache once per week. During previous visit patient reported being  treated with "oral botox" in the past. She was referred to neurology during last evaluation, however, appt never scheduled. States that she feels onset of headache in office.      Review of Systems   Constitutional: Negative for activity change, appetite change, chills, fever and unexpected weight change.   HENT: Negative for congestion, sneezing and sore throat.    Eyes: Positive for photophobia. Negative for redness, itching and visual disturbance.   Respiratory: Negative for cough, chest tightness, shortness of breath and wheezing.    Cardiovascular: Negative for chest pain.   Gastrointestinal: Negative for abdominal pain, constipation, diarrhea, nausea and vomiting.   Genitourinary: Negative for dysuria, frequency and urgency.   Skin: Negative for rash.   Neurological: Positive for headaches. Negative for dizziness and syncope.   Psychiatric/Behavioral: Negative for dysphoric mood and suicidal ideas. The patient is not nervous/anxious.          Past Medical History:   Diagnosis Date    Moyamoya 2011       Patient Active Problem List   Diagnosis    " "Hyperlipidemia    Moyamoya    History of cerebrovascular accident    Tobacco use    History of migraine headaches    Elevated blood pressure reading in office without diagnosis of hypertension    Dyslipidemia    Vitamin D deficiency    Chronic migraine without aura without status migrainosus, not intractable       Past Surgical History:   Procedure Laterality Date    BILATERAL TUBAL LIGATION  2009    ORIF TIBIA & FIBULA FRACTURES Right 2004    Hardware in Place       Family History   Problem Relation Age of Onset    Breast cancer Mother 57         in 60s    Prostate cancer Father     Hypertension Father     Lung cancer Brother     Colon cancer Brother        Social History     Tobacco Use   Smoking Status Current Every Day Smoker    Types: Cigarettes       Social History     Social History Narrative    Tobacco: Initiated in 20s; 1ppd    EtOH: Occasional; few times per year; has max use of 3 drinks at once    Drug: None    Employment: Unemployed. Receives disability for medical condition ([employer])    Education: Bachelor's Degree     Lives with 3 children              Medications have been reviewed and reconciled.     Review of patient's allergies indicates:  No Known Allergies     Objective:        Vitals:    20 1120   BP: (!) 156/96   Pulse: 77   SpO2: 99%   Weight: 84 kg (185 lb 3 oz)   Height: 5' 4" (1.626 m)       Physical Exam  Constitutional:       General: She is not in acute distress.     Appearance: She is well-developed. She is not diaphoretic.   HENT:      Head: Normocephalic and atraumatic.      Right Ear: External ear normal.      Left Ear: External ear normal.      Nose: Nose normal.      Mouth/Throat:      Pharynx: No oropharyngeal exudate.   Eyes:      General: No scleral icterus.        Left eye: No discharge.      Conjunctiva/sclera: Conjunctivae normal.   Neck:      Musculoskeletal: Normal range of motion and neck supple.   Cardiovascular:      Rate and " Rhythm: Normal rate and regular rhythm.      Heart sounds: Normal heart sounds. No murmur. No friction rub. No gallop.    Pulmonary:      Effort: Pulmonary effort is normal.      Breath sounds: Normal breath sounds. No stridor. No wheezing or rales.   Musculoskeletal: Normal range of motion.   Skin:     General: Skin is warm and dry.   Neurological:      Mental Status: She is alert and oriented to person, place, and time.      Cranial Nerves: No cranial nerve deficit.      Deep Tendon Reflexes: Reflexes are normal and symmetric.   Psychiatric:      Comments: Pt mildly irritable in the office         Assessment:       1. Chronic migraine without aura without status migrainosus, not intractable    2. Moyamoya    3. Dyslipidemia    4. Elevated blood pressure reading in office without diagnosis of hypertension    5. Tobacco use    6. Vitamin D deficiency        Plan:       Darwin was seen today for migraine.    Diagnoses and all orders for this visit:    Chronic migraine without aura without status migrainosus, not intractable  -     Ambulatory referral/consult to Neurology; Future  -     propranoloL (INDERAL) 20 MG tablet; Take 1 tablet (20 mg total) by mouth 2 (two) times daily.  -     naproxen (NAPROSYN) 500 MG tablet; Take 1 tablet (500 mg total) by mouth 2 (two) times daily as needed.    Moyamoya    Dyslipidemia    Elevated blood pressure reading in office without diagnosis of hypertension    Tobacco use    Vitamin D deficiency      H/o Migraine Headaches  --Trial of propranolol 20 mg BID as prophylactic  --Naproxen 500 mg BID prn or Excedrin as abortive  --Neurology evaluation given concomitant moyamoya disease [avoiding triptans]    Moyamoya  --has had several CVAs in the past dating back to 2013  --She is now receiving disability (states CVAs were stress-induced; by job)   --Discuss ASA and Statin initiation on f/u visit  --Evaluated by vascular 3/12/2020. Advised No need for vascular surgery intervention.  Recommend medical management    Dyslipidemia  --2/18/220TC 238, , HDL 44, .0; 10 yr ASCVD risk 12.9%  Needs high intensity statin. Discuss rosuvastatin 20 mg hs with pt.       Elevated BP without dx of HTN   --BP consistently elevated over past year   --Meets criteria for HTN  --Started on propranolol 20 mg BID for migraine ppx   --F/U BP in 4 weeks    Tobacco Use  --Discuss tobacco cessation on f/u visit    Vitamin D deficiency   Vitamin D level 5 2/18/2020  --Needs ergocalciferol 50K U qweek x 16 weeks     HM   Hep C and HIV screens neg 2/18/2020  Mammogram BIRADs Cat 1 neg 2/18/2020  Discuss Pap/CRC screen/PPSV-23/Tdap/Shingrix on f/u visit   Offer flu vaccine on f/u; previously declined 2/2020  A1c 5.1; CMP wnl; CBC wnl; TSH 0.605 2/18/2020     Follow up in about 4 weeks (around 12/7/2020) for Migraine Headaches and Elevated blood pressure.

## 2020-11-11 PROBLEM — G43.709 CHRONIC MIGRAINE WITHOUT AURA WITHOUT STATUS MIGRAINOSUS, NOT INTRACTABLE: Status: ACTIVE | Noted: 2020-11-11

## 2020-11-11 PROBLEM — R03.0 ELEVATED BLOOD PRESSURE READING IN OFFICE WITHOUT DIAGNOSIS OF HYPERTENSION: Status: ACTIVE | Noted: 2020-11-11

## 2020-11-11 PROBLEM — E78.5 DYSLIPIDEMIA: Status: ACTIVE | Noted: 2020-11-11

## 2020-11-11 PROBLEM — E55.9 VITAMIN D DEFICIENCY: Status: ACTIVE | Noted: 2020-11-11

## 2020-11-30 ENCOUNTER — OFFICE VISIT (OUTPATIENT)
Dept: URGENT CARE | Facility: CLINIC | Age: 50
End: 2020-11-30
Payer: MEDICARE

## 2020-11-30 VITALS
DIASTOLIC BLOOD PRESSURE: 91 MMHG | WEIGHT: 185 LBS | TEMPERATURE: 98 F | HEIGHT: 64 IN | SYSTOLIC BLOOD PRESSURE: 159 MMHG | HEART RATE: 75 BPM | BODY MASS INDEX: 31.58 KG/M2 | OXYGEN SATURATION: 98 %

## 2020-11-30 DIAGNOSIS — J06.9 VIRAL URI WITH COUGH: Primary | ICD-10-CM

## 2020-11-30 DIAGNOSIS — Z11.59 SCREENING FOR VIRAL DISEASE: ICD-10-CM

## 2020-11-30 DIAGNOSIS — R03.0 ELEVATED BLOOD PRESSURE READING IN OFFICE WITHOUT DIAGNOSIS OF HYPERTENSION: ICD-10-CM

## 2020-11-30 LAB
CTP QC/QA: YES
SARS-COV-2 RDRP RESP QL NAA+PROBE: NEGATIVE

## 2020-11-30 PROCEDURE — 3008F PR BODY MASS INDEX (BMI) DOCUMENTED: ICD-10-PCS | Mod: CPTII,S$GLB,, | Performed by: NURSE PRACTITIONER

## 2020-11-30 PROCEDURE — 99213 PR OFFICE/OUTPT VISIT, EST, LEVL III, 20-29 MIN: ICD-10-PCS | Mod: S$GLB,,, | Performed by: NURSE PRACTITIONER

## 2020-11-30 PROCEDURE — 3008F BODY MASS INDEX DOCD: CPT | Mod: CPTII,S$GLB,, | Performed by: NURSE PRACTITIONER

## 2020-11-30 PROCEDURE — U0002: ICD-10-PCS | Mod: QW,S$GLB,, | Performed by: NURSE PRACTITIONER

## 2020-11-30 PROCEDURE — 99213 OFFICE O/P EST LOW 20 MIN: CPT | Mod: S$GLB,,, | Performed by: NURSE PRACTITIONER

## 2020-11-30 PROCEDURE — U0002 COVID-19 LAB TEST NON-CDC: HCPCS | Mod: QW,S$GLB,, | Performed by: NURSE PRACTITIONER

## 2020-11-30 NOTE — PROGRESS NOTES
"Subjective:       Patient ID: Darwin Beltrán is a 50 y.o. female.    Vitals:  height is 5' 4" (1.626 m) and weight is 83.9 kg (185 lb). Her temperature is 98 °F (36.7 °C). Her blood pressure is 159/91 (abnormal) and her pulse is 75. Her oxygen saturation is 98%.     Chief Complaint: COVID-19 Concerns    Pt states x2 days cough, diarrhea but the diarrhea has resolved.     Other  This is a new problem. The current episode started in the past 7 days. The problem occurs intermittently. The problem has been gradually worsening. Associated symptoms include congestion and coughing. Pertinent negatives include no arthralgias, chest pain, chills, fatigue, fever, headaches, joint swelling, myalgias, nausea, rash, sore throat, vertigo, vomiting or weakness. Nothing aggravates the symptoms. She has tried nothing for the symptoms.       Constitution: Negative for chills, fatigue and fever.   HENT: Positive for congestion. Negative for sore throat.    Neck: Negative for painful lymph nodes.   Cardiovascular: Negative for chest pain and leg swelling.   Eyes: Negative for double vision and blurred vision.   Respiratory: Positive for cough. Negative for shortness of breath.    Gastrointestinal: Positive for diarrhea. Negative for nausea and vomiting.   Genitourinary: Negative for dysuria, frequency, urgency and history of kidney stones.   Musculoskeletal: Negative for joint pain, joint swelling, muscle cramps and muscle ache.   Skin: Negative for color change, pale, rash and bruising.   Allergic/Immunologic: Negative for seasonal allergies.   Neurological: Negative for dizziness, history of vertigo, light-headedness, passing out and headaches.   Hematologic/Lymphatic: Negative for swollen lymph nodes.   Psychiatric/Behavioral: Negative for nervous/anxious, sleep disturbance and depression. The patient is not nervous/anxious.        Objective:      Physical Exam   Constitutional: She is oriented to person, place, and time. She " appears well-developed. She is cooperative.  Non-toxic appearance. She does not appear ill. No distress.   HENT:   Head: Normocephalic and atraumatic.   Ears:   Right Ear: Hearing, tympanic membrane, external ear and ear canal normal.   Left Ear: Hearing, tympanic membrane, external ear and ear canal normal.   Nose: Nose normal. No mucosal edema, rhinorrhea, nasal deformity or congestion. No epistaxis. Right sinus exhibits no maxillary sinus tenderness and no frontal sinus tenderness. Left sinus exhibits no maxillary sinus tenderness and no frontal sinus tenderness.   Mouth/Throat: Uvula is midline, oropharynx is clear and moist and mucous membranes are normal. Mucous membranes are moist. No trismus in the jaw. Normal dentition. No uvula swelling. No oropharyngeal exudate, posterior oropharyngeal edema or posterior oropharyngeal erythema.   Eyes: Conjunctivae and lids are normal. No scleral icterus.   Neck: Trachea normal, full passive range of motion without pain and phonation normal. Neck supple. No neck rigidity. No edema and no erythema present.   Cardiovascular: Normal rate, regular rhythm, S1 normal, S2 normal, normal heart sounds and normal pulses. Exam reveals no gallop and no friction rub.   No murmur heard.  Pulmonary/Chest: Effort normal and breath sounds normal. No stridor. No respiratory distress. She has no decreased breath sounds. She has no wheezes. She has no rhonchi.   Abdominal: Normal appearance and bowel sounds are normal. She exhibits no distension. There is no abdominal tenderness.   Musculoskeletal: Normal range of motion.         General: No deformity.   Neurological: She is alert and oriented to person, place, and time. She exhibits normal muscle tone. Coordination normal.   Skin: Skin is warm, dry, intact, not diaphoretic and not pale. Psychiatric: Her speech is normal and behavior is normal. Judgment and thought content normal.   Nursing note and vitals reviewed.        Results for orders  placed or performed in visit on 11/30/20   POCT COVID-19 Rapid Screening   Result Value Ref Range    POC Rapid COVID Negative Negative     Acceptable Yes      Assessment:       1. Viral URI with cough    2. Screening for viral disease    3. Elevated blood pressure reading in office without diagnosis of hypertension        Plan:         Viral URI with cough    Screening for viral disease  -     POCT COVID-19 Rapid Screening    Elevated blood pressure reading in office without diagnosis of hypertension         Patient Instructions     Viral Upper Respiratory Illness (Adult)  You have a viral upper respiratory illness (URI), which is another term for the common cold. This illness is contagious during the first few days. It is spread through the air by coughing and sneezing. It may also be spread by direct contact (touching the sick person and then touching your own eyes, nose, or mouth). Frequent handwashing will decrease risk of spread. Most viral illnesses go away within 7 to 10 days with rest and simple home remedies. Sometimes the illness may last for several weeks. Antibiotics will not kill a virus, and they are generally not prescribed for this condition.    Home care  · If symptoms are severe, rest at home for the first 2 to 3 days. When you resume activity, don't let yourself get too tired.  · Avoid being exposed to cigarette smoke (yours or others).  · You may use acetaminophen or ibuprofen to control pain and fever, unless another medicine was prescribed. (Note: If you have chronic liver or kidney disease, have ever had a stomach ulcer or gastrointestinal bleeding, or are taking blood-thinning medicines, talk with your healthcare provider before using these medicines.) Aspirin should never be given to anyone under 18 years of age who is ill with a viral infection or fever. It may cause severe liver or brain damage.  · Your appetite may be poor, so a light diet is fine. Avoid dehydration by  drinking 6 to 8 glasses of fluids per day (water, soft drinks, juices, tea, or soup). Extra fluids will help loosen secretions in the nose and lungs.  · Over-the-counter cold medicines will not shorten the length of time youre sick, but they may be helpful for the following symptoms: cough, sore throat, and nasal and sinus congestion. (Note: Do not use decongestants if you have high blood pressure.)  Follow-up care  Follow up with your healthcare provider, or as advised.  When to seek medical advice  Call your healthcare provider right away if any of these occur:  · Cough with lots of colored sputum (mucus)  · Severe headache; face, neck, or ear pain  · Difficulty swallowing due to throat pain  · Fever of 100.4°F (38°C)  Call 911, or get immediate medical care  Call emergency services right away if any of these occur:  · Chest pain, shortness of breath, wheezing, or difficulty breathing  · Coughing up blood  · Inability to swallow due to throat pain  Date Last Reviewed: 9/13/2015  © 8257-7400 [employer]. [address] [place] [address], [place] [address]. All rights reserved. This information is not intended as a substitute for professional medical care. Always follow your healthcare professional's instructions.

## 2020-11-30 NOTE — PATIENT INSTRUCTIONS
Viral Upper Respiratory Illness (Adult)  You have a viral upper respiratory illness (URI), which is another term for the common cold. This illness is contagious during the first few days. It is spread through the air by coughing and sneezing. It may also be spread by direct contact (touching the sick person and then touching your own eyes, nose, or mouth). Frequent handwashing will decrease risk of spread. Most viral illnesses go away within 7 to 10 days with rest and simple home remedies. Sometimes the illness may last for several weeks. Antibiotics will not kill a virus, and they are generally not prescribed for this condition.    Home care  · If symptoms are severe, rest at home for the first 2 to 3 days. When you resume activity, don't let yourself get too tired.  · Avoid being exposed to cigarette smoke (yours or others).  · You may use acetaminophen or ibuprofen to control pain and fever, unless another medicine was prescribed. (Note: If you have chronic liver or kidney disease, have ever had a stomach ulcer or gastrointestinal bleeding, or are taking blood-thinning medicines, talk with your healthcare provider before using these medicines.) Aspirin should never be given to anyone under 18 years of age who is ill with a viral infection or fever. It may cause severe liver or brain damage.  · Your appetite may be poor, so a light diet is fine. Avoid dehydration by drinking 6 to 8 glasses of fluids per day (water, soft drinks, juices, tea, or soup). Extra fluids will help loosen secretions in the nose and lungs.  · Over-the-counter cold medicines will not shorten the length of time youre sick, but they may be helpful for the following symptoms: cough, sore throat, and nasal and sinus congestion. (Note: Do not use decongestants if you have high blood pressure.)  Follow-up care  Follow up with your healthcare provider, or as advised.  When to seek medical advice  Call your healthcare provider right away if any  of these occur:  · Cough with lots of colored sputum (mucus)  · Severe headache; face, neck, or ear pain  · Difficulty swallowing due to throat pain  · Fever of 100.4°F (38°C)  Call 911, or get immediate medical care  Call emergency services right away if any of these occur:  · Chest pain, shortness of breath, wheezing, or difficulty breathing  · Coughing up blood  · Inability to swallow due to throat pain  Date Last Reviewed: 9/13/2015  © 4542-8071 [employer]. [address] [place] [address]. All rights reserved. This information is not intended as a substitute for professional medical care. Always follow your healthcare professional's instructions.

## 2020-12-07 ENCOUNTER — OFFICE VISIT (OUTPATIENT)
Dept: FAMILY MEDICINE | Facility: CLINIC | Age: 50
End: 2020-12-07
Payer: MEDICARE

## 2020-12-07 VITALS
BODY MASS INDEX: 31.91 KG/M2 | SYSTOLIC BLOOD PRESSURE: 164 MMHG | HEART RATE: 76 BPM | DIASTOLIC BLOOD PRESSURE: 100 MMHG | HEIGHT: 64 IN | WEIGHT: 186.94 LBS | OXYGEN SATURATION: 100 %

## 2020-12-07 DIAGNOSIS — Z12.11 SCREEN FOR COLON CANCER: ICD-10-CM

## 2020-12-07 DIAGNOSIS — E55.9 VITAMIN D DEFICIENCY: ICD-10-CM

## 2020-12-07 DIAGNOSIS — I67.5 MOYAMOYA: ICD-10-CM

## 2020-12-07 DIAGNOSIS — E78.5 DYSLIPIDEMIA: ICD-10-CM

## 2020-12-07 DIAGNOSIS — G43.709 CHRONIC MIGRAINE WITHOUT AURA WITHOUT STATUS MIGRAINOSUS, NOT INTRACTABLE: ICD-10-CM

## 2020-12-07 DIAGNOSIS — I10 ESSENTIAL HYPERTENSION: Primary | ICD-10-CM

## 2020-12-07 DIAGNOSIS — Z72.0 TOBACCO USE: ICD-10-CM

## 2020-12-07 PROCEDURE — 99499 UNLISTED E&M SERVICE: CPT | Mod: S$GLB,,, | Performed by: FAMILY MEDICINE

## 2020-12-07 PROCEDURE — 99499 RISK ADDL DX/OHS AUDIT: ICD-10-PCS | Mod: S$GLB,,, | Performed by: FAMILY MEDICINE

## 2020-12-07 PROCEDURE — G0009 PNEUMOCOCCAL POLYSACCHARIDE VACCINE 23-VALENT =>2YO SQ IM: ICD-10-PCS | Mod: S$GLB,,, | Performed by: FAMILY MEDICINE

## 2020-12-07 PROCEDURE — 3008F BODY MASS INDEX DOCD: CPT | Mod: CPTII,S$GLB,, | Performed by: FAMILY MEDICINE

## 2020-12-07 PROCEDURE — G0009 ADMIN PNEUMOCOCCAL VACCINE: HCPCS | Mod: S$GLB,,, | Performed by: FAMILY MEDICINE

## 2020-12-07 PROCEDURE — 99214 PR OFFICE/OUTPT VISIT, EST, LEVL IV, 30-39 MIN: ICD-10-PCS | Mod: 25,S$GLB,, | Performed by: FAMILY MEDICINE

## 2020-12-07 PROCEDURE — 90732 PNEUMOCOCCAL POLYSACCHARIDE VACCINE 23-VALENT =>2YO SQ IM: ICD-10-PCS | Mod: S$GLB,,, | Performed by: FAMILY MEDICINE

## 2020-12-07 PROCEDURE — 1126F PR PAIN SEVERITY QUANTIFIED, NO PAIN PRESENT: ICD-10-PCS | Mod: S$GLB,,, | Performed by: FAMILY MEDICINE

## 2020-12-07 PROCEDURE — 99999 PR PBB SHADOW E&M-EST. PATIENT-LVL III: CPT | Mod: PBBFAC,,, | Performed by: FAMILY MEDICINE

## 2020-12-07 PROCEDURE — 99999 PR PBB SHADOW E&M-EST. PATIENT-LVL III: ICD-10-PCS | Mod: PBBFAC,,, | Performed by: FAMILY MEDICINE

## 2020-12-07 PROCEDURE — 3008F PR BODY MASS INDEX (BMI) DOCUMENTED: ICD-10-PCS | Mod: CPTII,S$GLB,, | Performed by: FAMILY MEDICINE

## 2020-12-07 PROCEDURE — 1126F AMNT PAIN NOTED NONE PRSNT: CPT | Mod: S$GLB,,, | Performed by: FAMILY MEDICINE

## 2020-12-07 PROCEDURE — 90732 PPSV23 VACC 2 YRS+ SUBQ/IM: CPT | Mod: S$GLB,,, | Performed by: FAMILY MEDICINE

## 2020-12-07 PROCEDURE — 99214 OFFICE O/P EST MOD 30 MIN: CPT | Mod: 25,S$GLB,, | Performed by: FAMILY MEDICINE

## 2020-12-07 RX ORDER — AMLODIPINE BESYLATE 5 MG/1
5 TABLET ORAL DAILY
Qty: 90 TABLET | Refills: 0 | Status: SHIPPED | OUTPATIENT
Start: 2020-12-07 | End: 2021-02-12

## 2020-12-07 NOTE — PROGRESS NOTES
"Subjective:       Patient ID: Darwin Beltrán is a 50 y.o. female.    Chief Complaint: Follow-up    49 yo F, established pt of mine (last evaluated 11/9/2020) with PMH significant for (?) multiple CVAs and cerebrovascular MoyaMoya diagnosed in 2011. She presents today to f/u Migraine headaches and newly diagnosed HTN. Details of previous HPI(s) reviewed:     ========  Pt states she usually experiences a headache once per week. Patient states that she usually develops sensitivity to bright light which is followed by development of a generalized headache. Patient says she is unable to describe the pain. Reports nausea and irritability with headaches. No phonophobia. No vomiting. No numbness, tingling, or weakness to the extremities. No associated neck pain. Has some improvement with excedrin migraine.   During previous visit patient reported being  treated with "oral botox" in the past. She was referred to neurology during last evaluation, however, appt never scheduled.  =======  She was rx'd propranolol 20 mg BID last visit for both migraines and HTN. States that she has been using propranolol 20 mg on a prn basis only which has helped abort migraine attacks. She is scheduled to see neurology on 1/25/2021 Her BP was 164/100 on triage and 177/99 on my repeat. Pt states that she did not sleep well last night after taking supplemental [place] and thinks this is the reason why her BP is elevated. Noted that BP has consistently been 140s-150s/mid-80s-90s over past 1 year.       Review of Systems   Constitutional: Negative for activity change, appetite change, chills, fever and unexpected weight change.   HENT: Negative for congestion, sneezing and sore throat.    Eyes: Negative for redness, itching and visual disturbance.   Respiratory: Negative for cough, chest tightness, shortness of breath and wheezing.    Cardiovascular: Negative for chest pain.   Gastrointestinal: Negative for abdominal pain, constipation, diarrhea, " "nausea and vomiting.   Genitourinary: Negative for dysuria, frequency and urgency.   Skin: Negative for rash.   Neurological: Positive for headaches. Negative for dizziness and syncope.   Psychiatric/Behavioral: Negative for dysphoric mood and suicidal ideas. The patient is not nervous/anxious.          Past Medical History:   Diagnosis Date    2011       Patient Active Problem List   Diagnosis    Hyperlipidemia    Moyamoya    History of cerebrovascular accident    Tobacco use    History of migraine headaches    Elevated blood pressure reading in office without diagnosis of hypertension    Dyslipidemia    Vitamin D deficiency    Chronic migraine without aura without status migrainosus, not intractable    Essential hypertension       Past Surgical History:   Procedure Laterality Date    BILATERAL TUBAL LIGATION  2009    ORIF TIBIA & FIBULA FRACTURES Right 2004    Hardware in Place       Family History   Problem Relation Age of Onset    Breast cancer Mother 57         in 60s    Prostate cancer Father     Hypertension Father     Lung cancer Brother     Colon cancer Brother        Social History     Tobacco Use   Smoking Status Current Every Day Smoker    Types: Cigarettes       Social History     Social History Narrative    Tobacco: Initiated in 20s; 1ppd    EtOH: Occasional; few times per year; has max use of 3 drinks at once    Drug: None    Employment: Unemployed. Receives disability for medical condition ()    Education: Bachelor's Degree     Lives with 3 children              Medications have been reviewed and reconciled.     Review of patient's allergies indicates:  No Known Allergies     Objective:        Vitals:    20 1047   BP: (!) 164/100   Pulse: 76   SpO2: 100%   Weight: 84.8 kg (186 lb 15.2 oz)   Height: 5' 4" (1.626 m)       Physical Exam  Constitutional:       General: She is not in acute distress.     Appearance: She is well-developed.   HENT:    "   Head: Normocephalic and atraumatic.      Nose: Nose normal.      Mouth/Throat:      Pharynx: No oropharyngeal exudate.   Eyes:      General: No scleral icterus.  Neck:      Musculoskeletal: Normal range of motion and neck supple.   Cardiovascular:      Rate and Rhythm: Normal rate and regular rhythm.      Heart sounds: Normal heart sounds. No murmur. No friction rub. No gallop.    Pulmonary:      Effort: Pulmonary effort is normal.      Breath sounds: Normal breath sounds. No wheezing or rales.   Musculoskeletal: Normal range of motion.   Lymphadenopathy:      Cervical: No cervical adenopathy.   Skin:     General: Skin is warm and dry.      Findings: No erythema or rash.   Neurological:      Mental Status: She is alert and oriented to person, place, and time.      Cranial Nerves: No cranial nerve deficit.      Sensory: No sensory deficit.      Deep Tendon Reflexes: Reflexes normal.   Psychiatric:         Behavior: Behavior normal.         Assessment:       1. Essential hypertension    2. Chronic migraine without aura without status migrainosus, not intractable    3. Moyamoya    4. Dyslipidemia    5. Tobacco use    6. Vitamin D deficiency    7. Screen for colon cancer        Plan:       Darwin was seen today for follow-up.    Diagnoses and all orders for this visit:    Essential hypertension  -     amLODIPine (NORVASC) 5 MG tablet; Take 1 tablet (5 mg total) by mouth once daily.    Chronic migraine without aura without status migrainosus, not intractable    Moyamoya    Dyslipidemia    Tobacco use  -     (In Office Administered) Pneumococcal Polysaccharide Vaccine (23 Valent) (SQ/IM)    Vitamin D deficiency    Screen for colon cancer  -     Case Request Endoscopy: COLONOSCOPY      HTN   --BP consistently elevated over past year   --Meets criteria for HTN  --Started on propranolol 20 mg BID for migraine ppx last visit with no improvement   --Start amlodipine 5 mg daily  --BP f/u in 4 weeks (approx 1/11/2021)      Migraine Headaches  --Improved with propranolol 20 mg prn per pt  --Advised propranolol 20 mg BID DAILY as prophylactic  --Naproxen 500 mg BID prn or Excedrin as abortive  --Neurology evaluation given concomitant moyamoya disease [avoiding triptans]    Moyamoya  --has had several CVAs in the past dating back to 2013  --She is now receiving disability (states CVAs were stress-induced; by job)   --Discuss ASA and Statin initiation on f/u visit  --Evaluated by vascular 3/12/2020. Advised No need for vascular surgery intervention. Recommend medical management    Dyslipidemia  --2/18/2020 , , HDL 44, .0; 10 yr ASCVD risk 12.9%  Advised high intensity statin [ rosuvastatin 20 mg hs] during today's visit. Pt declines initiation. Would like to work on diet/lifestyle modifications only. Plan for repeat FLP in 3 months [3/2020]    Tobacco Use  --Discuss tobacco cessation over subsequent visit(s)    Vitamin D deficiency   Vitamin D level 5 2/18/2020  --Advised ergocalciferol 50K U qweek x 16 weeks today. Pt declines stating she has is not able to tolerate any vitamins. Given handout on Vitamin D rich foods and advised daily exposure to sunlight x 20-30 minutes     HM   Hep C and HIV screens neg 2/18/2020  Mammogram BIRADs Cat 1 neg 2/18/2020  Plan for Pap on next visit   Colonoscopy referral placed today 12/7/2020  PPSV-23 today 12/7/2020  Tdap administered 2/18/2020  EncouragedShingrix from pharmacy   Flu vaccine declined today 12/7/2020  A1c 5.1; CMP wnl; CBC wnl; TSH 0.605 2/18/2020       Follow up in about 4 weeks (around 1/4/2021) for HTN f/u visit.

## 2021-01-04 ENCOUNTER — PATIENT MESSAGE (OUTPATIENT)
Dept: ADMINISTRATIVE | Facility: HOSPITAL | Age: 51
End: 2021-01-04

## 2021-01-11 ENCOUNTER — OFFICE VISIT (OUTPATIENT)
Dept: FAMILY MEDICINE | Facility: CLINIC | Age: 51
End: 2021-01-11
Payer: MEDICARE

## 2021-01-11 VITALS
SYSTOLIC BLOOD PRESSURE: 146 MMHG | BODY MASS INDEX: 31.57 KG/M2 | HEART RATE: 84 BPM | WEIGHT: 184.94 LBS | OXYGEN SATURATION: 99 % | HEIGHT: 64 IN | DIASTOLIC BLOOD PRESSURE: 91 MMHG

## 2021-01-11 DIAGNOSIS — E78.5 DYSLIPIDEMIA: ICD-10-CM

## 2021-01-11 DIAGNOSIS — Z72.0 TOBACCO USE: ICD-10-CM

## 2021-01-11 DIAGNOSIS — G43.709 CHRONIC MIGRAINE WITHOUT AURA WITHOUT STATUS MIGRAINOSUS, NOT INTRACTABLE: ICD-10-CM

## 2021-01-11 DIAGNOSIS — I67.5 MOYAMOYA: ICD-10-CM

## 2021-01-11 DIAGNOSIS — I10 ESSENTIAL HYPERTENSION: Primary | ICD-10-CM

## 2021-01-11 DIAGNOSIS — E55.9 VITAMIN D DEFICIENCY: ICD-10-CM

## 2021-01-11 PROCEDURE — 99999 PR PBB SHADOW E&M-EST. PATIENT-LVL III: CPT | Mod: PBBFAC,,, | Performed by: FAMILY MEDICINE

## 2021-01-11 PROCEDURE — 99499 UNLISTED E&M SERVICE: CPT | Mod: S$GLB,,, | Performed by: FAMILY MEDICINE

## 2021-01-11 PROCEDURE — 1126F PR PAIN SEVERITY QUANTIFIED, NO PAIN PRESENT: ICD-10-PCS | Mod: S$GLB,,, | Performed by: FAMILY MEDICINE

## 2021-01-11 PROCEDURE — 99214 OFFICE O/P EST MOD 30 MIN: CPT | Mod: S$GLB,,, | Performed by: FAMILY MEDICINE

## 2021-01-11 PROCEDURE — 99499 RISK ADDL DX/OHS AUDIT: ICD-10-PCS | Mod: S$GLB,,, | Performed by: FAMILY MEDICINE

## 2021-01-11 PROCEDURE — 3077F SYST BP >= 140 MM HG: CPT | Mod: CPTII,S$GLB,, | Performed by: FAMILY MEDICINE

## 2021-01-11 PROCEDURE — 3080F PR MOST RECENT DIASTOLIC BLOOD PRESSURE >= 90 MM HG: ICD-10-PCS | Mod: CPTII,S$GLB,, | Performed by: FAMILY MEDICINE

## 2021-01-11 PROCEDURE — 3077F PR MOST RECENT SYSTOLIC BLOOD PRESSURE >= 140 MM HG: ICD-10-PCS | Mod: CPTII,S$GLB,, | Performed by: FAMILY MEDICINE

## 2021-01-11 PROCEDURE — 99999 PR PBB SHADOW E&M-EST. PATIENT-LVL III: ICD-10-PCS | Mod: PBBFAC,,, | Performed by: FAMILY MEDICINE

## 2021-01-11 PROCEDURE — 1126F AMNT PAIN NOTED NONE PRSNT: CPT | Mod: S$GLB,,, | Performed by: FAMILY MEDICINE

## 2021-01-11 PROCEDURE — 3008F BODY MASS INDEX DOCD: CPT | Mod: CPTII,S$GLB,, | Performed by: FAMILY MEDICINE

## 2021-01-11 PROCEDURE — 99214 PR OFFICE/OUTPT VISIT, EST, LEVL IV, 30-39 MIN: ICD-10-PCS | Mod: S$GLB,,, | Performed by: FAMILY MEDICINE

## 2021-01-11 PROCEDURE — 3080F DIAST BP >= 90 MM HG: CPT | Mod: CPTII,S$GLB,, | Performed by: FAMILY MEDICINE

## 2021-01-11 PROCEDURE — 3008F PR BODY MASS INDEX (BMI) DOCUMENTED: ICD-10-PCS | Mod: CPTII,S$GLB,, | Performed by: FAMILY MEDICINE

## 2021-01-21 ENCOUNTER — PATIENT OUTREACH (OUTPATIENT)
Dept: ADMINISTRATIVE | Facility: OTHER | Age: 51
End: 2021-01-21

## 2021-01-25 ENCOUNTER — OFFICE VISIT (OUTPATIENT)
Dept: NEUROLOGY | Facility: CLINIC | Age: 51
End: 2021-01-25
Payer: MEDICARE

## 2021-01-25 VITALS
SYSTOLIC BLOOD PRESSURE: 150 MMHG | DIASTOLIC BLOOD PRESSURE: 101 MMHG | WEIGHT: 180.75 LBS | BODY MASS INDEX: 32.03 KG/M2 | HEIGHT: 63 IN | HEART RATE: 73 BPM

## 2021-01-25 DIAGNOSIS — G43.009 MIGRAINE WITHOUT AURA AND WITHOUT STATUS MIGRAINOSUS, NOT INTRACTABLE: Primary | ICD-10-CM

## 2021-01-25 DIAGNOSIS — I10 ESSENTIAL HYPERTENSION: ICD-10-CM

## 2021-01-25 DIAGNOSIS — Z86.73 HISTORY OF CEREBROVASCULAR ACCIDENT: ICD-10-CM

## 2021-01-25 DIAGNOSIS — I67.5 MOYAMOYA: ICD-10-CM

## 2021-01-25 PROCEDURE — 99204 PR OFFICE/OUTPT VISIT, NEW, LEVL IV, 45-59 MIN: ICD-10-PCS | Mod: S$GLB,,, | Performed by: NURSE PRACTITIONER

## 2021-01-25 PROCEDURE — 1126F PR PAIN SEVERITY QUANTIFIED, NO PAIN PRESENT: ICD-10-PCS | Mod: S$GLB,,, | Performed by: NURSE PRACTITIONER

## 2021-01-25 PROCEDURE — 3077F SYST BP >= 140 MM HG: CPT | Mod: CPTII,S$GLB,, | Performed by: NURSE PRACTITIONER

## 2021-01-25 PROCEDURE — 3077F PR MOST RECENT SYSTOLIC BLOOD PRESSURE >= 140 MM HG: ICD-10-PCS | Mod: CPTII,S$GLB,, | Performed by: NURSE PRACTITIONER

## 2021-01-25 PROCEDURE — 99204 OFFICE O/P NEW MOD 45 MIN: CPT | Mod: S$GLB,,, | Performed by: NURSE PRACTITIONER

## 2021-01-25 PROCEDURE — 99999 PR PBB SHADOW E&M-EST. PATIENT-LVL III: ICD-10-PCS | Mod: PBBFAC,,, | Performed by: NURSE PRACTITIONER

## 2021-01-25 PROCEDURE — 1126F AMNT PAIN NOTED NONE PRSNT: CPT | Mod: S$GLB,,, | Performed by: NURSE PRACTITIONER

## 2021-01-25 PROCEDURE — 3008F BODY MASS INDEX DOCD: CPT | Mod: CPTII,S$GLB,, | Performed by: NURSE PRACTITIONER

## 2021-01-25 PROCEDURE — 99999 PR PBB SHADOW E&M-EST. PATIENT-LVL III: CPT | Mod: PBBFAC,,, | Performed by: NURSE PRACTITIONER

## 2021-01-25 PROCEDURE — 3080F DIAST BP >= 90 MM HG: CPT | Mod: CPTII,S$GLB,, | Performed by: NURSE PRACTITIONER

## 2021-01-25 PROCEDURE — 3080F PR MOST RECENT DIASTOLIC BLOOD PRESSURE >= 90 MM HG: ICD-10-PCS | Mod: CPTII,S$GLB,, | Performed by: NURSE PRACTITIONER

## 2021-01-25 PROCEDURE — 3008F PR BODY MASS INDEX (BMI) DOCUMENTED: ICD-10-PCS | Mod: CPTII,S$GLB,, | Performed by: NURSE PRACTITIONER

## 2021-01-26 ENCOUNTER — PATIENT MESSAGE (OUTPATIENT)
Dept: OTOLARYNGOLOGY | Facility: CLINIC | Age: 51
End: 2021-01-26

## 2021-01-26 ENCOUNTER — TELEPHONE (OUTPATIENT)
Dept: OTOLARYNGOLOGY | Facility: CLINIC | Age: 51
End: 2021-01-26

## 2021-01-26 DIAGNOSIS — Z01.812 PRE-PROCEDURE LAB EXAM: ICD-10-CM

## 2021-01-26 RX ORDER — SODIUM, POTASSIUM,MAG SULFATES 17.5-3.13G
1 SOLUTION, RECONSTITUTED, ORAL ORAL DAILY
Qty: 1 KIT | Refills: 0 | Status: SHIPPED | OUTPATIENT
Start: 2021-01-26 | End: 2021-01-28

## 2021-02-08 ENCOUNTER — OFFICE VISIT (OUTPATIENT)
Dept: FAMILY MEDICINE | Facility: CLINIC | Age: 51
End: 2021-02-08
Payer: MEDICARE

## 2021-02-08 ENCOUNTER — PATIENT MESSAGE (OUTPATIENT)
Dept: FAMILY MEDICINE | Facility: CLINIC | Age: 51
End: 2021-02-08

## 2021-02-08 ENCOUNTER — LAB VISIT (OUTPATIENT)
Dept: LAB | Facility: HOSPITAL | Age: 51
End: 2021-02-08
Attending: FAMILY MEDICINE
Payer: MEDICARE

## 2021-02-08 VITALS
BODY MASS INDEX: 32.3 KG/M2 | SYSTOLIC BLOOD PRESSURE: 140 MMHG | OXYGEN SATURATION: 99 % | HEIGHT: 63 IN | HEART RATE: 87 BPM | WEIGHT: 182.31 LBS | DIASTOLIC BLOOD PRESSURE: 82 MMHG

## 2021-02-08 DIAGNOSIS — Z01.419 ENCOUNTER FOR WELL WOMAN EXAM WITH ROUTINE GYNECOLOGICAL EXAM: Primary | ICD-10-CM

## 2021-02-08 DIAGNOSIS — Z12.31 ENCOUNTER FOR SCREENING MAMMOGRAM FOR MALIGNANT NEOPLASM OF BREAST: ICD-10-CM

## 2021-02-08 DIAGNOSIS — G43.709 CHRONIC MIGRAINE WITHOUT AURA WITHOUT STATUS MIGRAINOSUS, NOT INTRACTABLE: ICD-10-CM

## 2021-02-08 DIAGNOSIS — Z79.899 MEDICATION MANAGEMENT: ICD-10-CM

## 2021-02-08 DIAGNOSIS — E55.9 VITAMIN D DEFICIENCY: ICD-10-CM

## 2021-02-08 DIAGNOSIS — Z72.0 TOBACCO USE: ICD-10-CM

## 2021-02-08 DIAGNOSIS — E78.5 DYSLIPIDEMIA: ICD-10-CM

## 2021-02-08 DIAGNOSIS — I10 ESSENTIAL HYPERTENSION: ICD-10-CM

## 2021-02-08 DIAGNOSIS — Z86.73 HISTORY OF CEREBROVASCULAR ACCIDENT: ICD-10-CM

## 2021-02-08 DIAGNOSIS — I67.5 MOYAMOYA: ICD-10-CM

## 2021-02-08 LAB
25(OH)D3+25(OH)D2 SERPL-MCNC: 6 NG/ML (ref 30–96)
ALBUMIN SERPL BCP-MCNC: 4 G/DL (ref 3.5–5.2)
ALP SERPL-CCNC: 103 U/L (ref 55–135)
ALT SERPL W/O P-5'-P-CCNC: 13 U/L (ref 10–44)
ANION GAP SERPL CALC-SCNC: 7 MMOL/L (ref 8–16)
AST SERPL-CCNC: 17 U/L (ref 10–40)
BASOPHILS # BLD AUTO: 0.08 K/UL (ref 0–0.2)
BASOPHILS NFR BLD: 1 % (ref 0–1.9)
BILIRUB SERPL-MCNC: 0.2 MG/DL (ref 0.1–1)
BUN SERPL-MCNC: 12 MG/DL (ref 6–20)
CALCIUM SERPL-MCNC: 9.6 MG/DL (ref 8.7–10.5)
CHLORIDE SERPL-SCNC: 107 MMOL/L (ref 95–110)
CHOLEST SERPL-MCNC: 232 MG/DL (ref 120–199)
CHOLEST/HDLC SERPL: 4.9 {RATIO} (ref 2–5)
CO2 SERPL-SCNC: 27 MMOL/L (ref 23–29)
CREAT SERPL-MCNC: 0.9 MG/DL (ref 0.5–1.4)
DIFFERENTIAL METHOD: NORMAL
EOSINOPHIL # BLD AUTO: 0.2 K/UL (ref 0–0.5)
EOSINOPHIL NFR BLD: 2.5 % (ref 0–8)
ERYTHROCYTE [DISTWIDTH] IN BLOOD BY AUTOMATED COUNT: 13.1 % (ref 11.5–14.5)
EST. GFR  (AFRICAN AMERICAN): >60 ML/MIN/1.73 M^2
EST. GFR  (NON AFRICAN AMERICAN): >60 ML/MIN/1.73 M^2
ESTIMATED AVG GLUCOSE: 114 MG/DL (ref 68–131)
GLUCOSE SERPL-MCNC: 86 MG/DL (ref 70–110)
HBA1C MFR BLD: 5.6 % (ref 4–5.6)
HCT VFR BLD AUTO: 43.5 % (ref 37–48.5)
HDLC SERPL-MCNC: 47 MG/DL (ref 40–75)
HDLC SERPL: 20.3 % (ref 20–50)
HGB BLD-MCNC: 13.9 G/DL (ref 12–16)
IMM GRANULOCYTES # BLD AUTO: 0.01 K/UL (ref 0–0.04)
IMM GRANULOCYTES NFR BLD AUTO: 0.1 % (ref 0–0.5)
LDLC SERPL CALC-MCNC: 160.8 MG/DL (ref 63–159)
LYMPHOCYTES # BLD AUTO: 3.7 K/UL (ref 1–4.8)
LYMPHOCYTES NFR BLD: 46.1 % (ref 18–48)
MCH RBC QN AUTO: 29.1 PG (ref 27–31)
MCHC RBC AUTO-ENTMCNC: 32 G/DL (ref 32–36)
MCV RBC AUTO: 91 FL (ref 82–98)
MONOCYTES # BLD AUTO: 0.6 K/UL (ref 0.3–1)
MONOCYTES NFR BLD: 7 % (ref 4–15)
NEUTROPHILS # BLD AUTO: 3.5 K/UL (ref 1.8–7.7)
NEUTROPHILS NFR BLD: 43.3 % (ref 38–73)
NONHDLC SERPL-MCNC: 185 MG/DL
NRBC BLD-RTO: 0 /100 WBC
PLATELET # BLD AUTO: 325 K/UL (ref 150–350)
PMV BLD AUTO: 11 FL (ref 9.2–12.9)
POTASSIUM SERPL-SCNC: 4.9 MMOL/L (ref 3.5–5.1)
PROT SERPL-MCNC: 7.3 G/DL (ref 6–8.4)
RBC # BLD AUTO: 4.78 M/UL (ref 4–5.4)
SODIUM SERPL-SCNC: 141 MMOL/L (ref 136–145)
TRIGL SERPL-MCNC: 121 MG/DL (ref 30–150)
WBC # BLD AUTO: 8.03 K/UL (ref 3.9–12.7)

## 2021-02-08 PROCEDURE — 3079F PR MOST RECENT DIASTOLIC BLOOD PRESSURE 80-89 MM HG: ICD-10-PCS | Mod: CPTII,S$GLB,, | Performed by: FAMILY MEDICINE

## 2021-02-08 PROCEDURE — 83036 HEMOGLOBIN GLYCOSYLATED A1C: CPT

## 2021-02-08 PROCEDURE — 99999 PR PBB SHADOW E&M-EST. PATIENT-LVL III: CPT | Mod: PBBFAC,,, | Performed by: FAMILY MEDICINE

## 2021-02-08 PROCEDURE — 1126F PR PAIN SEVERITY QUANTIFIED, NO PAIN PRESENT: ICD-10-PCS | Mod: S$GLB,,, | Performed by: FAMILY MEDICINE

## 2021-02-08 PROCEDURE — 99214 PR OFFICE/OUTPT VISIT, EST, LEVL IV, 30-39 MIN: ICD-10-PCS | Mod: S$GLB,,, | Performed by: FAMILY MEDICINE

## 2021-02-08 PROCEDURE — 82306 VITAMIN D 25 HYDROXY: CPT

## 2021-02-08 PROCEDURE — 99499 UNLISTED E&M SERVICE: CPT | Mod: S$GLB,,, | Performed by: FAMILY MEDICINE

## 2021-02-08 PROCEDURE — 85025 COMPLETE CBC W/AUTO DIFF WBC: CPT

## 2021-02-08 PROCEDURE — 87624 HPV HI-RISK TYP POOLED RSLT: CPT

## 2021-02-08 PROCEDURE — 99499 RISK ADDL DX/OHS AUDIT: ICD-10-PCS | Mod: S$GLB,,, | Performed by: FAMILY MEDICINE

## 2021-02-08 PROCEDURE — 1126F AMNT PAIN NOTED NONE PRSNT: CPT | Mod: S$GLB,,, | Performed by: FAMILY MEDICINE

## 2021-02-08 PROCEDURE — 99214 OFFICE O/P EST MOD 30 MIN: CPT | Mod: S$GLB,,, | Performed by: FAMILY MEDICINE

## 2021-02-08 PROCEDURE — 3079F DIAST BP 80-89 MM HG: CPT | Mod: CPTII,S$GLB,, | Performed by: FAMILY MEDICINE

## 2021-02-08 PROCEDURE — 99999 PR PBB SHADOW E&M-EST. PATIENT-LVL III: ICD-10-PCS | Mod: PBBFAC,,, | Performed by: FAMILY MEDICINE

## 2021-02-08 PROCEDURE — 80053 COMPREHEN METABOLIC PANEL: CPT

## 2021-02-08 PROCEDURE — 36415 COLL VENOUS BLD VENIPUNCTURE: CPT

## 2021-02-08 PROCEDURE — 80061 LIPID PANEL: CPT

## 2021-02-08 PROCEDURE — 3077F PR MOST RECENT SYSTOLIC BLOOD PRESSURE >= 140 MM HG: ICD-10-PCS | Mod: CPTII,S$GLB,, | Performed by: FAMILY MEDICINE

## 2021-02-08 PROCEDURE — 3077F SYST BP >= 140 MM HG: CPT | Mod: CPTII,S$GLB,, | Performed by: FAMILY MEDICINE

## 2021-02-08 PROCEDURE — 3008F PR BODY MASS INDEX (BMI) DOCUMENTED: ICD-10-PCS | Mod: CPTII,S$GLB,, | Performed by: FAMILY MEDICINE

## 2021-02-08 PROCEDURE — 3008F BODY MASS INDEX DOCD: CPT | Mod: CPTII,S$GLB,, | Performed by: FAMILY MEDICINE

## 2021-02-08 PROCEDURE — 88175 CYTOPATH C/V AUTO FLUID REDO: CPT | Performed by: FAMILY MEDICINE

## 2021-02-12 ENCOUNTER — PATIENT MESSAGE (OUTPATIENT)
Dept: FAMILY MEDICINE | Facility: CLINIC | Age: 51
End: 2021-02-12

## 2021-02-12 DIAGNOSIS — I10 ESSENTIAL HYPERTENSION: ICD-10-CM

## 2021-02-12 RX ORDER — AMLODIPINE BESYLATE 5 MG/1
10 TABLET ORAL DAILY
Qty: 90 TABLET | Refills: 0
Start: 2021-02-12 | End: 2021-03-01

## 2021-02-15 LAB
HPV HR 12 DNA SPEC QL NAA+PROBE: NEGATIVE
HPV16 AG SPEC QL: NEGATIVE
HPV18 DNA SPEC QL NAA+PROBE: NEGATIVE

## 2021-02-22 ENCOUNTER — HOSPITAL ENCOUNTER (OUTPATIENT)
Dept: RADIOLOGY | Facility: HOSPITAL | Age: 51
Discharge: HOME | End: 2021-02-22
Attending: FAMILY MEDICINE
Payer: MEDICARE

## 2021-02-22 ENCOUNTER — TELEPHONE (OUTPATIENT)
Dept: ENDOSCOPY | Facility: HOSPITAL | Age: 51
End: 2021-02-22

## 2021-02-22 DIAGNOSIS — Z12.31 ENCOUNTER FOR SCREENING MAMMOGRAM FOR MALIGNANT NEOPLASM OF BREAST: ICD-10-CM

## 2021-02-22 DIAGNOSIS — Z01.419 ENCOUNTER FOR WELL WOMAN EXAM WITH ROUTINE GYNECOLOGICAL EXAM: ICD-10-CM

## 2021-02-22 PROCEDURE — 77063 BREAST TOMOSYNTHESIS BI: CPT | Mod: 26,,, | Performed by: RADIOLOGY

## 2021-02-22 PROCEDURE — 77067 SCR MAMMO BI INCL CAD: CPT | Mod: TC

## 2021-02-22 PROCEDURE — 77067 SCR MAMMO BI INCL CAD: CPT | Mod: 26,,, | Performed by: RADIOLOGY

## 2021-02-22 PROCEDURE — 77063 MAMMO DIGITAL SCREENING BILAT WITH TOMO: ICD-10-PCS | Mod: 26,,, | Performed by: RADIOLOGY

## 2021-02-22 PROCEDURE — 77067 MAMMO DIGITAL SCREENING BILAT WITH TOMO: ICD-10-PCS | Mod: 26,,, | Performed by: RADIOLOGY

## 2021-02-24 ENCOUNTER — ANESTHESIA EVENT (OUTPATIENT)
Dept: ENDOSCOPY | Facility: HOSPITAL | Age: 51
End: 2021-02-24
Payer: MEDICARE

## 2021-02-24 ENCOUNTER — ANESTHESIA (OUTPATIENT)
Dept: ENDOSCOPY | Facility: HOSPITAL | Age: 51
End: 2021-02-24
Payer: MEDICARE

## 2021-02-24 ENCOUNTER — HOSPITAL ENCOUNTER (OUTPATIENT)
Facility: HOSPITAL | Age: 51
Discharge: HOME | End: 2021-02-24
Attending: INTERNAL MEDICINE | Admitting: INTERNAL MEDICINE
Payer: MEDICARE

## 2021-02-24 VITALS
HEIGHT: 63 IN | WEIGHT: 180 LBS | BODY MASS INDEX: 31.89 KG/M2 | SYSTOLIC BLOOD PRESSURE: 123 MMHG | RESPIRATION RATE: 18 BRPM | HEART RATE: 76 BPM | TEMPERATURE: 98 F | DIASTOLIC BLOOD PRESSURE: 73 MMHG | OXYGEN SATURATION: 100 %

## 2021-02-24 DIAGNOSIS — Z12.11 SCREEN FOR COLON CANCER: ICD-10-CM

## 2021-02-24 LAB — SARS-COV-2 RDRP RESP QL NAA+PROBE: NEGATIVE

## 2021-02-24 PROCEDURE — 88305 TISSUE EXAM BY PATHOLOGIST: CPT | Performed by: PATHOLOGY

## 2021-02-24 PROCEDURE — 37000008 HC ANESTHESIA 1ST 15 MINUTES: Performed by: INTERNAL MEDICINE

## 2021-02-24 PROCEDURE — 25000003 PHARM REV CODE 250: Performed by: INTERNAL MEDICINE

## 2021-02-24 PROCEDURE — 88305 TISSUE EXAM BY PATHOLOGIST: ICD-10-PCS | Mod: 26,,, | Performed by: PATHOLOGY

## 2021-02-24 PROCEDURE — 37000009 HC ANESTHESIA EA ADD 15 MINS: Performed by: INTERNAL MEDICINE

## 2021-02-24 PROCEDURE — 45385 COLONOSCOPY W/LESION REMOVAL: CPT | Performed by: INTERNAL MEDICINE

## 2021-02-24 PROCEDURE — 88305 TISSUE EXAM BY PATHOLOGIST: CPT | Mod: 26,,, | Performed by: PATHOLOGY

## 2021-02-24 PROCEDURE — 27201089 HC SNARE, DISP (ANY): Performed by: INTERNAL MEDICINE

## 2021-02-24 PROCEDURE — U0002 COVID-19 LAB TEST NON-CDC: HCPCS

## 2021-02-24 PROCEDURE — 63600175 PHARM REV CODE 636 W HCPCS: Performed by: NURSE ANESTHETIST, CERTIFIED REGISTERED

## 2021-02-24 PROCEDURE — 45385 PR COLONOSCOPY,REMV LESN,SNARE: ICD-10-PCS | Mod: PT,,, | Performed by: INTERNAL MEDICINE

## 2021-02-24 PROCEDURE — 25000003 PHARM REV CODE 250: Performed by: NURSE ANESTHETIST, CERTIFIED REGISTERED

## 2021-02-24 PROCEDURE — 45385 COLONOSCOPY W/LESION REMOVAL: CPT | Mod: PT,,, | Performed by: INTERNAL MEDICINE

## 2021-02-24 RX ORDER — PHENYLEPHRINE HYDROCHLORIDE 10 MG/ML
INJECTION INTRAVENOUS
Status: DISCONTINUED | OUTPATIENT
Start: 2021-02-24 | End: 2021-02-24

## 2021-02-24 RX ORDER — SODIUM CHLORIDE 9 MG/ML
INJECTION, SOLUTION INTRAVENOUS CONTINUOUS
Status: ACTIVE | OUTPATIENT
Start: 2021-02-24

## 2021-02-24 RX ORDER — LIDOCAINE HYDROCHLORIDE 20 MG/ML
INJECTION INTRAVENOUS
Status: DISCONTINUED | OUTPATIENT
Start: 2021-02-24 | End: 2021-02-24

## 2021-02-24 RX ORDER — PROPOFOL 10 MG/ML
VIAL (ML) INTRAVENOUS
Status: DISCONTINUED | OUTPATIENT
Start: 2021-02-24 | End: 2021-02-24

## 2021-02-24 RX ORDER — PROPOFOL 10 MG/ML
VIAL (ML) INTRAVENOUS CONTINUOUS PRN
Status: DISCONTINUED | OUTPATIENT
Start: 2021-02-24 | End: 2021-02-24

## 2021-02-24 RX ORDER — SODIUM CHLORIDE 0.9 % (FLUSH) 0.9 %
10 SYRINGE (ML) INJECTION
Status: ACTIVE | OUTPATIENT
Start: 2021-02-24

## 2021-02-24 RX ADMIN — SODIUM CHLORIDE: 0.9 INJECTION, SOLUTION INTRAVENOUS at 11:02

## 2021-02-24 RX ADMIN — PROPOFOL 80 MG: 10 INJECTION, EMULSION INTRAVENOUS at 02:02

## 2021-02-24 RX ADMIN — LIDOCAINE HYDROCHLORIDE 100 MG: 20 INJECTION, SOLUTION INTRAVENOUS at 02:02

## 2021-02-24 RX ADMIN — PROPOFOL 150 MCG/KG/MIN: 10 INJECTION, EMULSION INTRAVENOUS at 02:02

## 2021-02-24 RX ADMIN — PHENYLEPHRINE HYDROCHLORIDE 100 MCG: 10 INJECTION INTRAVENOUS at 02:02

## 2021-03-01 DIAGNOSIS — I10 ESSENTIAL HYPERTENSION: ICD-10-CM

## 2021-03-01 LAB
FINAL PATHOLOGIC DIAGNOSIS: NORMAL
GROSS: NORMAL
Lab: NORMAL

## 2021-03-01 RX ORDER — AMLODIPINE BESYLATE 10 MG/1
10 TABLET ORAL DAILY
Qty: 90 TABLET | Refills: 1 | Status: SHIPPED | OUTPATIENT
Start: 2021-03-01 | End: 2021-08-24

## 2021-03-05 ENCOUNTER — PATIENT MESSAGE (OUTPATIENT)
Dept: FAMILY MEDICINE | Facility: CLINIC | Age: 51
End: 2021-03-05

## 2021-03-05 DIAGNOSIS — A59.01 TRICHOMONAS VAGINALIS (TV) INFECTION: Primary | ICD-10-CM

## 2021-03-05 LAB
FINAL PATHOLOGIC DIAGNOSIS: NORMAL
Lab: NORMAL

## 2021-03-05 RX ORDER — METRONIDAZOLE 500 MG/1
2000 TABLET ORAL ONCE
Qty: 4 TABLET | Refills: 0 | Status: SHIPPED | OUTPATIENT
Start: 2021-03-05 | End: 2021-03-05

## 2021-06-30 ENCOUNTER — TELEPHONE (OUTPATIENT)
Dept: PHARMACY | Facility: CLINIC | Age: 51
End: 2021-06-30

## 2021-06-30 ENCOUNTER — OFFICE VISIT (OUTPATIENT)
Dept: NEUROLOGY | Facility: CLINIC | Age: 51
End: 2021-06-30
Payer: MEDICARE

## 2021-06-30 VITALS
SYSTOLIC BLOOD PRESSURE: 161 MMHG | BODY MASS INDEX: 32.1 KG/M2 | DIASTOLIC BLOOD PRESSURE: 103 MMHG | HEART RATE: 85 BPM | WEIGHT: 181.19 LBS

## 2021-06-30 DIAGNOSIS — G43.009 MIGRAINE WITHOUT AURA AND WITHOUT STATUS MIGRAINOSUS, NOT INTRACTABLE: ICD-10-CM

## 2021-06-30 DIAGNOSIS — I10 ESSENTIAL HYPERTENSION: ICD-10-CM

## 2021-06-30 DIAGNOSIS — I67.5 MOYAMOYA: ICD-10-CM

## 2021-06-30 DIAGNOSIS — Z86.73 HISTORY OF CEREBROVASCULAR ACCIDENT: ICD-10-CM

## 2021-06-30 PROCEDURE — 99214 OFFICE O/P EST MOD 30 MIN: CPT | Mod: S$GLB,,, | Performed by: NURSE PRACTITIONER

## 2021-06-30 PROCEDURE — 3008F PR BODY MASS INDEX (BMI) DOCUMENTED: ICD-10-PCS | Mod: CPTII,S$GLB,, | Performed by: NURSE PRACTITIONER

## 2021-06-30 PROCEDURE — 99499 RISK ADDL DX/OHS AUDIT: ICD-10-PCS | Mod: S$GLB,,, | Performed by: NURSE PRACTITIONER

## 2021-06-30 PROCEDURE — 1126F PR PAIN SEVERITY QUANTIFIED, NO PAIN PRESENT: ICD-10-PCS | Mod: S$GLB,,, | Performed by: NURSE PRACTITIONER

## 2021-06-30 PROCEDURE — 99999 PR PBB SHADOW E&M-EST. PATIENT-LVL III: ICD-10-PCS | Mod: PBBFAC,,, | Performed by: NURSE PRACTITIONER

## 2021-06-30 PROCEDURE — 99214 PR OFFICE/OUTPT VISIT, EST, LEVL IV, 30-39 MIN: ICD-10-PCS | Mod: S$GLB,,, | Performed by: NURSE PRACTITIONER

## 2021-06-30 PROCEDURE — 99999 PR PBB SHADOW E&M-EST. PATIENT-LVL III: CPT | Mod: PBBFAC,,, | Performed by: NURSE PRACTITIONER

## 2021-06-30 PROCEDURE — 1126F AMNT PAIN NOTED NONE PRSNT: CPT | Mod: S$GLB,,, | Performed by: NURSE PRACTITIONER

## 2021-06-30 PROCEDURE — 3008F BODY MASS INDEX DOCD: CPT | Mod: CPTII,S$GLB,, | Performed by: NURSE PRACTITIONER

## 2021-06-30 PROCEDURE — 99499 UNLISTED E&M SERVICE: CPT | Mod: S$GLB,,, | Performed by: NURSE PRACTITIONER

## 2021-07-01 ENCOUNTER — TELEPHONE (OUTPATIENT)
Dept: NEUROLOGY | Facility: CLINIC | Age: 51
End: 2021-07-01

## 2021-08-04 ENCOUNTER — PROCEDURE VISIT (OUTPATIENT)
Dept: NEUROLOGY | Facility: CLINIC | Age: 51
End: 2021-08-04
Payer: MEDICARE

## 2021-08-04 VITALS
HEIGHT: 63 IN | WEIGHT: 178.69 LBS | SYSTOLIC BLOOD PRESSURE: 121 MMHG | BODY MASS INDEX: 31.66 KG/M2 | DIASTOLIC BLOOD PRESSURE: 81 MMHG | HEART RATE: 85 BPM

## 2021-08-04 DIAGNOSIS — Z86.73 HISTORY OF CEREBROVASCULAR ACCIDENT: ICD-10-CM

## 2021-08-04 DIAGNOSIS — G43.009 MIGRAINE WITHOUT AURA AND WITHOUT STATUS MIGRAINOSUS, NOT INTRACTABLE: Primary | ICD-10-CM

## 2021-08-04 DIAGNOSIS — I10 ESSENTIAL HYPERTENSION: ICD-10-CM

## 2021-08-04 PROCEDURE — 99214 PR OFFICE/OUTPT VISIT, EST, LEVL IV, 30-39 MIN: ICD-10-PCS | Mod: S$GLB,,, | Performed by: NURSE PRACTITIONER

## 2021-08-04 PROCEDURE — 99499 RISK ADDL DX/OHS AUDIT: ICD-10-PCS | Mod: S$GLB,,, | Performed by: NURSE PRACTITIONER

## 2021-08-04 PROCEDURE — 99214 OFFICE O/P EST MOD 30 MIN: CPT | Mod: S$GLB,,, | Performed by: NURSE PRACTITIONER

## 2021-08-04 PROCEDURE — 99499 UNLISTED E&M SERVICE: CPT | Mod: S$GLB,,, | Performed by: NURSE PRACTITIONER

## 2021-08-04 RX ORDER — ERENUMAB-AOOE 140 MG/ML
140 INJECTION, SOLUTION SUBCUTANEOUS
Qty: 1 SYRINGE | Refills: 11 | Status: SHIPPED | OUTPATIENT
Start: 2021-08-04 | End: 2023-10-25

## 2021-08-16 ENCOUNTER — TELEPHONE (OUTPATIENT)
Dept: PHARMACY | Facility: CLINIC | Age: 51
End: 2021-08-16

## 2021-09-03 ENCOUNTER — PATIENT MESSAGE (OUTPATIENT)
Dept: NEUROLOGY | Facility: CLINIC | Age: 51
End: 2021-09-03

## 2021-11-29 ENCOUNTER — TELEPHONE (OUTPATIENT)
Dept: FAMILY MEDICINE | Facility: CLINIC | Age: 51
End: 2021-11-29
Payer: MEDICARE

## 2021-12-01 PROBLEM — E78.5 HYPERLIPIDEMIA: Status: RESOLVED | Noted: 2020-02-18 | Resolved: 2021-12-01

## 2021-12-01 PROBLEM — Z86.69 HISTORY OF MIGRAINE HEADACHES: Status: RESOLVED | Noted: 2020-02-26 | Resolved: 2021-12-01

## 2021-12-01 PROBLEM — Z12.11 SCREEN FOR COLON CANCER: Status: RESOLVED | Noted: 2021-02-24 | Resolved: 2021-12-01

## 2021-12-01 PROBLEM — R03.0 ELEVATED BLOOD PRESSURE READING IN OFFICE WITHOUT DIAGNOSIS OF HYPERTENSION: Status: RESOLVED | Noted: 2020-11-11 | Resolved: 2021-12-01

## 2021-12-01 PROBLEM — I65.23 BILATERAL CAROTID ARTERY STENOSIS: Status: ACTIVE | Noted: 2021-12-01

## 2021-12-02 ENCOUNTER — PATIENT MESSAGE (OUTPATIENT)
Dept: ADMINISTRATIVE | Facility: CLINIC | Age: 51
End: 2021-12-02
Payer: MEDICARE

## 2021-12-10 DIAGNOSIS — I10 ESSENTIAL HYPERTENSION: ICD-10-CM

## 2021-12-10 RX ORDER — AMLODIPINE BESYLATE 10 MG/1
TABLET ORAL
Qty: 30 TABLET | Refills: 0 | Status: SHIPPED | OUTPATIENT
Start: 2021-12-10 | End: 2022-01-03

## 2022-01-06 ENCOUNTER — LAB VISIT (OUTPATIENT)
Dept: PRIMARY CARE CLINIC | Facility: CLINIC | Age: 52
End: 2022-01-06
Payer: MEDICARE

## 2022-01-06 DIAGNOSIS — Z20.822 CONTACT WITH AND (SUSPECTED) EXPOSURE TO COVID-19: ICD-10-CM

## 2022-01-06 LAB
CTP QC/QA: YES
SARS-COV-2 AG RESP QL IA.RAPID: NEGATIVE

## 2022-01-06 PROCEDURE — 87811 SARS-COV-2 COVID19 W/OPTIC: CPT

## 2022-01-07 ENCOUNTER — PES CALL (OUTPATIENT)
Dept: ADMINISTRATIVE | Facility: CLINIC | Age: 52
End: 2022-01-07
Payer: MEDICARE

## 2022-01-27 ENCOUNTER — PATIENT OUTREACH (OUTPATIENT)
Dept: ADMINISTRATIVE | Facility: HOSPITAL | Age: 52
End: 2022-01-27
Payer: MEDICARE

## 2022-01-27 DIAGNOSIS — Z12.31 ENCOUNTER FOR SCREENING MAMMOGRAM FOR MALIGNANT NEOPLASM OF BREAST: Primary | ICD-10-CM

## 2022-02-22 ENCOUNTER — IMMUNIZATION (OUTPATIENT)
Dept: PRIMARY CARE CLINIC | Facility: CLINIC | Age: 52
End: 2022-02-22
Payer: MEDICARE

## 2022-02-22 DIAGNOSIS — Z23 NEED FOR VACCINATION: Primary | ICD-10-CM

## 2022-02-22 PROCEDURE — 91306 COVID-19, MRNA, LNP-S, PF, 100 MCG/0.25 ML DOSE VACCINE (MODERNA BOOSTER): CPT | Mod: PBBFAC | Performed by: INTERNAL MEDICINE

## 2022-02-22 PROCEDURE — 0064A COVID-19, MRNA, LNP-S, PF, 100 MCG/0.25 ML DOSE VACCINE (MODERNA BOOSTER): CPT | Mod: CV19,PBBFAC | Performed by: INTERNAL MEDICINE

## 2022-03-09 ENCOUNTER — OFFICE VISIT (OUTPATIENT)
Dept: PRIMARY CARE CLINIC | Facility: CLINIC | Age: 52
End: 2022-03-09
Payer: MEDICARE

## 2022-03-09 VITALS
HEIGHT: 63 IN | HEART RATE: 80 BPM | OXYGEN SATURATION: 100 % | TEMPERATURE: 98 F | BODY MASS INDEX: 30.8 KG/M2 | DIASTOLIC BLOOD PRESSURE: 76 MMHG | RESPIRATION RATE: 18 BRPM | SYSTOLIC BLOOD PRESSURE: 165 MMHG | WEIGHT: 173.81 LBS

## 2022-03-09 DIAGNOSIS — I67.5 MOYAMOYA: ICD-10-CM

## 2022-03-09 DIAGNOSIS — E78.5 DYSLIPIDEMIA: ICD-10-CM

## 2022-03-09 DIAGNOSIS — Z00.00 ANNUAL PHYSICAL EXAM: Primary | ICD-10-CM

## 2022-03-09 DIAGNOSIS — I10 ESSENTIAL HYPERTENSION: ICD-10-CM

## 2022-03-09 DIAGNOSIS — Z79.899 MEDICATION MANAGEMENT: ICD-10-CM

## 2022-03-09 DIAGNOSIS — E55.9 VITAMIN D DEFICIENCY: ICD-10-CM

## 2022-03-09 DIAGNOSIS — G43.709 CHRONIC MIGRAINE WITHOUT AURA WITHOUT STATUS MIGRAINOSUS, NOT INTRACTABLE: ICD-10-CM

## 2022-03-09 DIAGNOSIS — Z72.0 TOBACCO USE: ICD-10-CM

## 2022-03-09 DIAGNOSIS — R53.1 LEFT-SIDED WEAKNESS: ICD-10-CM

## 2022-03-09 DIAGNOSIS — F43.9 STRESS: ICD-10-CM

## 2022-03-09 DIAGNOSIS — I65.23 BILATERAL CAROTID ARTERY STENOSIS: ICD-10-CM

## 2022-03-09 PROCEDURE — 3078F DIAST BP <80 MM HG: CPT | Mod: CPTII,S$GLB,, | Performed by: FAMILY MEDICINE

## 2022-03-09 PROCEDURE — 99499 RISK ADDL DX/OHS AUDIT: ICD-10-PCS | Mod: S$GLB,,, | Performed by: FAMILY MEDICINE

## 2022-03-09 PROCEDURE — 99499 UNLISTED E&M SERVICE: CPT | Mod: S$GLB,,, | Performed by: FAMILY MEDICINE

## 2022-03-09 PROCEDURE — 99214 PR OFFICE/OUTPT VISIT, EST, LEVL IV, 30-39 MIN: ICD-10-PCS | Mod: S$GLB,,, | Performed by: FAMILY MEDICINE

## 2022-03-09 PROCEDURE — 99214 OFFICE O/P EST MOD 30 MIN: CPT | Mod: S$GLB,,, | Performed by: FAMILY MEDICINE

## 2022-03-09 PROCEDURE — 3078F PR MOST RECENT DIASTOLIC BLOOD PRESSURE < 80 MM HG: ICD-10-PCS | Mod: CPTII,S$GLB,, | Performed by: FAMILY MEDICINE

## 2022-03-09 PROCEDURE — 1159F PR MEDICATION LIST DOCUMENTED IN MEDICAL RECORD: ICD-10-PCS | Mod: CPTII,S$GLB,, | Performed by: FAMILY MEDICINE

## 2022-03-09 PROCEDURE — 3008F PR BODY MASS INDEX (BMI) DOCUMENTED: ICD-10-PCS | Mod: CPTII,S$GLB,, | Performed by: FAMILY MEDICINE

## 2022-03-09 PROCEDURE — 3077F SYST BP >= 140 MM HG: CPT | Mod: CPTII,S$GLB,, | Performed by: FAMILY MEDICINE

## 2022-03-09 PROCEDURE — 1159F MED LIST DOCD IN RCRD: CPT | Mod: CPTII,S$GLB,, | Performed by: FAMILY MEDICINE

## 2022-03-09 PROCEDURE — 3077F PR MOST RECENT SYSTOLIC BLOOD PRESSURE >= 140 MM HG: ICD-10-PCS | Mod: CPTII,S$GLB,, | Performed by: FAMILY MEDICINE

## 2022-03-09 PROCEDURE — 3008F BODY MASS INDEX DOCD: CPT | Mod: CPTII,S$GLB,, | Performed by: FAMILY MEDICINE

## 2022-03-09 PROCEDURE — 99999 PR PBB SHADOW E&M-EST. PATIENT-LVL IV: ICD-10-PCS | Mod: PBBFAC,,, | Performed by: FAMILY MEDICINE

## 2022-03-09 PROCEDURE — 99999 PR PBB SHADOW E&M-EST. PATIENT-LVL IV: CPT | Mod: PBBFAC,,, | Performed by: FAMILY MEDICINE

## 2022-03-09 RX ORDER — HYDROCHLOROTHIAZIDE 25 MG/1
25 TABLET ORAL DAILY
Qty: 90 TABLET | Refills: 3 | Status: SHIPPED | OUTPATIENT
Start: 2022-03-09 | End: 2023-10-25

## 2022-03-09 NOTE — PATIENT INSTRUCTIONS
[facility]   [phone]--please call to get scheduled with psychologist.

## 2022-03-09 NOTE — PROGRESS NOTES
Subjective:       Patient ID: Darwin Beltrán is a 51 y.o. female.    Chief Complaint: Follow-up    52 yo F, established pt of mine (last evaluated 2021) with PMH significant for HTN, (?) multiple CVAs, and cerebrovascular MoyaMoya diagnosed in . She presents for annual exam. She is requesting handicapped placard. Reports recent weakness to left upper extremity and left lower extremity causing difficulty with ambulation. Reports she's had right sided hemiparesis in the past from CVAs due to MoyaMoya. She is only taking amlodipine 10 mg daily for HTN. No longer taking propranolol 20 mg BID for ppx. Reports having one injection of aimovig 2021, but has not had any migraines since this time. She never returned for repeat injection. She does report multiple stressors--not interested in medication, but amenable to therapy.       Past Medical History:   Diagnosis Date    Chronic migraine 2021    Elevated blood pressure reading in office without diagnosis of hypertension 2020    HTN (hypertension)     Moyamoya        Patient Active Problem List   Diagnosis    Moyamoya    History of cerebrovascular accident    Tobacco use    Dyslipidemia    Vitamin D deficiency    Chronic migraine without aura without status migrainosus, not intractable    Essential hypertension    Bilateral carotid artery stenosis    Impaired functional mobility, balance, gait, and endurance       Past Surgical History:   Procedure Laterality Date    BILATERAL TUBAL LIGATION      COLONOSCOPY N/A 2021    Procedure: COLONOSCOPY/SUPREP;  Surgeon: Jose Williamson MD;  Location: [facility];  Service: Endoscopy;  Laterality: N/A;  covid test   Kelly    ORIF TIBIA & FIBULA FRACTURES Right     Hardware in Place       Family History   Problem Relation Age of Onset    Breast cancer Mother 57         in 60s    Prostate cancer Father     Hypertension Father     Breast cancer Paternal  "Aunt     Breast cancer Paternal Grandmother     Lung cancer Brother     Colon cancer Brother        Social History     Tobacco Use   Smoking Status Current Every Day Smoker    Packs/day: 0.50    Types: Cigarettes   Smokeless Tobacco Current User       Social History     Social History Narrative    Tobacco: Initiated in 20s; 1ppd    EtOH: Occasional; few times per year; has max use of 3 drinks at once    Drug: None    Employment: Unemployed. Receives disability for medical condition ([place])    Education: Bachelor's Degree     Lives with 3 children              Medications have been reviewed and reconciled.     Review of patient's allergies indicates:  No Known Allergies     Review of Systems   Constitutional: Negative for activity change, appetite change, chills, fever and unexpected weight change.   HENT: Negative for congestion, sneezing and sore throat.    Eyes: Negative for redness, itching and visual disturbance.   Respiratory: Negative for cough, chest tightness, shortness of breath and wheezing.    Cardiovascular: Negative for chest pain.   Gastrointestinal: Negative for abdominal pain, constipation, diarrhea, nausea and vomiting.   Genitourinary: Negative for dysuria, frequency and urgency.   Skin: Negative for rash.   Neurological: Positive for weakness (LUE and LLE). Negative for dizziness, syncope and headaches.   Psychiatric/Behavioral: Negative for dysphoric mood and suicidal ideas. The patient is not nervous/anxious.          Objective:        BP (!) 165/76 (BP Location: Left arm, Patient Position: Sitting, BP Method: Large (Automatic))   Pulse 80   Temp 98.3 °F (36.8 °C) (Oral)   Resp 18   Ht 5' 3" (1.6 m)   Wt 78.8 kg (173 lb 13.3 oz)   LMP  (LMP Unknown)   SpO2 100%   BMI 30.79 kg/m²      Physical Exam  Constitutional:       General: She is not in acute distress.     Appearance: She is well-developed.   HENT:      Head: Normocephalic and atraumatic.      Right Ear: External " ear normal.      Left Ear: External ear normal.   Eyes:      General: No scleral icterus.  Cardiovascular:      Rate and Rhythm: Normal rate and regular rhythm.      Heart sounds: Normal heart sounds. No murmur heard.    No friction rub. No gallop.   Pulmonary:      Effort: Pulmonary effort is normal.      Breath sounds: Normal breath sounds. No wheezing or rales.   Musculoskeletal:         General: Normal range of motion.      Cervical back: Normal range of motion and neck supple.      Right lower leg: No edema.      Left lower leg: No edema.   Skin:     General: Skin is warm and dry.      Findings: No erythema or rash.   Neurological:      Mental Status: She is alert and oriented to person, place, and time.      Cranial Nerves: No cranial nerve deficit.   Psychiatric:         Mood and Affect: Mood normal.         Behavior: Behavior normal.         Assessment:       1. Annual physical exam    2. Essential hypertension    3. Chronic migraine without aura without status migrainosus, not intractable    4. Moyamoya    5. Dyslipidemia    6. Tobacco use    7. Vitamin D deficiency    8. Left-sided weakness    9. Stress    10. Bilateral carotid artery stenosis    11. Medication management        Plan:       Darwin was seen today for follow-up.    Diagnoses and all orders for this visit:    Annual physical exam    Essential hypertension  -     CBC Auto Differential; Future  -     Comprehensive Metabolic Panel; Future  -     hydroCHLOROthiazide (HYDRODIURIL) 25 MG tablet; Take 1 tablet (25 mg total) by mouth once daily.    Chronic migraine without aura without status migrainosus, not intractable    Moyamoya    Dyslipidemia  -     Lipid Panel; Future    Tobacco use    Vitamin D deficiency  -     Vitamin D; Future    Left-sided weakness  -     Ambulatory referral/consult to Physical/Occupational Therapy; Future    Stress  -     Ambulatory referral/consult to Psychology; Future    Bilateral carotid artery  stenosis  Comments:  Carotid sono 03/12/2020 showed 1-39% stenosis bilaterally    Medication management  -     Hemoglobin A1C; Future      Annual Exam  Pap/HPV neg 2/8/21   Mammogram scheduled for 3/11/22  Colonoscopy 02/24/21showed two 3 to 4 mm polyps in the rectum [path Polypoid colonic mucosa with prominent lymphoid aggregate. Negative for dysplasia]  Hep C and HIV screens neg 2/18/2020  PPSV-23 12/7/2020  Tdap administered 2/18/2020  EncouragedShingrix from pharmacy   Flu vaccine declined 3/09/22  RTC for fasting labs    HTN   --BP elevated in office. Repeat BP decreased to 140s/80s  Cont amlodipine 10 mg daily  Add HCTZ 25 mg daily  --BP f/u in 4-6 weeks.     Migraine Headaches  --Not requiring treatment. Had one dose of Aimovig 08/2021 with no further headaches since that time.    Cynthia  --has had several CVAs in the past dating back to 2013  --She is now receiving disability (states CVAs were stress-induced; by job)   --Advised to start ASA 81 mg daily.   --Evaluated by vascular 3/12/2020. Advised No need for vascular surgery intervention. Recommend medical management    Dyslipidemia  --2/18/2020 , , HDL 44, .0; 10 yr ASCVD risk 12.9%  Advised high intensity statin [ rosuvastatin 20 mg hs. Pt continues to decline initiation. Would like to work on diet/lifestyle modifications only. Plan for repeat FLP.     Tobacco Use  -Pre-contemplative stage of quitting  --Discuss tobacco cessation over subsequent visit(s)    Vitamin D deficiency   Vitamin D level 5 2/18/2020  --Pt declines ergocalciferol 50K U qweek x 16 weeks today stating she has is not able to tolerate any vitamins. Previously given handout on Vitamin D rich foods and advised daily exposure to sunlight x 20-30 minutes  --Plan for repeat Vitamin D    LUE and LLE weakness  --h/o CVA (pt states right-side affected only), but left-side could have been affected at some point.  --Refer to PT for evaluation     Stress  --Refer to  psychology       Follow up in about 6 weeks (around 4/20/2022) for HTN f/u visit.    Patient Instructions   [facility]   [phone]--please call to get scheduled with psychologist.

## 2022-03-11 ENCOUNTER — HOSPITAL ENCOUNTER (OUTPATIENT)
Dept: RADIOLOGY | Facility: HOSPITAL | Age: 52
Discharge: HOME | End: 2022-03-11
Attending: FAMILY MEDICINE
Payer: MEDICARE

## 2022-03-11 DIAGNOSIS — Z12.31 ENCOUNTER FOR SCREENING MAMMOGRAM FOR MALIGNANT NEOPLASM OF BREAST: ICD-10-CM

## 2022-03-11 PROCEDURE — 77063 BREAST TOMOSYNTHESIS BI: CPT | Mod: TC

## 2022-03-11 PROCEDURE — 77063 BREAST TOMOSYNTHESIS BI: CPT | Mod: 26,,, | Performed by: RADIOLOGY

## 2022-03-11 PROCEDURE — 77067 MAMMO DIGITAL SCREENING BILAT WITH TOMO: ICD-10-PCS | Mod: 26,,, | Performed by: RADIOLOGY

## 2022-03-11 PROCEDURE — 77063 MAMMO DIGITAL SCREENING BILAT WITH TOMO: ICD-10-PCS | Mod: 26,,, | Performed by: RADIOLOGY

## 2022-03-11 PROCEDURE — 77067 SCR MAMMO BI INCL CAD: CPT | Mod: 26,,, | Performed by: RADIOLOGY

## 2022-03-14 ENCOUNTER — CLINICAL SUPPORT (OUTPATIENT)
Dept: REHABILITATION | Facility: HOSPITAL | Age: 52
End: 2022-03-14
Attending: FAMILY MEDICINE
Payer: MEDICARE

## 2022-03-14 DIAGNOSIS — R53.1 LEFT-SIDED WEAKNESS: ICD-10-CM

## 2022-03-14 DIAGNOSIS — Z74.09 IMPAIRED FUNCTIONAL MOBILITY, BALANCE, GAIT, AND ENDURANCE: ICD-10-CM

## 2022-03-14 PROCEDURE — 97161 PT EVAL LOW COMPLEX 20 MIN: CPT | Mod: PN

## 2022-03-14 PROCEDURE — 97110 THERAPEUTIC EXERCISES: CPT | Mod: PN

## 2022-03-14 NOTE — PATIENT INSTRUCTIONS
Please walk in safe environments without treacherous ground and walk with a family member if you have balance or CV concerns.  Walk when the weather permits at the cool hours of the day in the summer and during rainy seasons.  PT recommends a HR monitor or pedometer to track levels of fitness daily and weekly.  Please do not exceed heart rate max when walking.  See AHA for Heart rate range recommendations for someone your age.

## 2022-03-14 NOTE — PLAN OF CARE
"[facility]  Physical Therapy Neurological Rehabilitation Initial Evaluation    Name: Darwin Beltrán  Clinic Number: [id number]    Therapy Diagnosis:   Encounter Diagnoses   Name Primary?    Left-sided weakness     Impaired functional mobility, balance, gait, and endurance      Physician: Tadeo Pulliam MD    Physician Orders: PT Eval and Treat   Medical Diagnosis from Referral: R53.1 (ICD-10-CM) - Left-sided weakness  Evaluation Date: 3/14/2022  Authorization Period Expiration: 03/28/2022  Plan of Care Expiration: 4/29/2022  Visit # / Visits authorized: 1/ 6    Time In: 7:30AM  Time Out: 8:25AM  Total Billable Time: 55 minutes (1 low eval; 2 TE)    Precautions: Standard and Moyamoya, Hypertension    Subjective   Date of onset: Chronic CVAs/Moyamoya (last CVA in 2015)  History of current condition - Darwin reports: Moyamoya diagnosed in 2011. Last stroke occurred in 2015 and she has had infarcts on both sides on her brain in the past. As a result, she reports weakness on both sides of her body. She has received outpatient therapy services (physical and speech therapy) in the past for post-stroke care. Just this past Friday, she experienced her first fall but says it occurred when she was "discombobulated" after getting out of bed. Otherwise, no major balance complaints. She is currently sedentary.     Medical History:   Past Medical History:   Diagnosis Date    Chronic migraine 6/30/2021    Elevated blood pressure reading in office without diagnosis of hypertension 11/11/2020    HTN (hypertension)     Moyamoya 2011     Surgical History:   Darwin Beltrán  has a past surgical history that includes ORIF tibia & fibula fractures (Right, 2004); Bilateral tubal ligation (2009); and Colonoscopy (N/A, 2/24/2021).    Medications:   Darwin has a current medication list which includes the following prescription(s): amlodipine, aimovig autoinjector, hydrochlorothiazide, and " mv-mn/iron/folic acid/herb 190, and the following Facility-Administered Medications: sodium chloride 0.9% and sodium chloride 0.9%.    Allergies:   Review of patient's allergies indicates:  No Known Allergies     Imaging  - VAS US Carotid Bilateral (3/12/2020): 1-39% Right ICA stenosis. Homogeneous plaque noted in the right internal carotid artery. Heterogeneous plaque noted in the right common carotid artery. Antegrade flow noted in the right vertebral artery. LEFT SIDE:   1-39% Left ICA stenosis. Homogeneous plaque noted in the left internal carotid artery. Homogeneous plaque noted in the left common carotid artery. Antegrade flow noted in the left vertebral artery.    Prior Therapy: In the past for similar complaints  Social History: Lives with two children  Falls: One fall last Friday; appears non-mechanical   DME: No  Home Environment: Single story home; 1 threshold to enter   Exercise Routine / History: Sedentary   Family Present at time of Eval: No   Occupation: Disabled  Prior Level of Function: Independent  Current Level of Function: Independent but with general weakness/sedentary lifestyle    Pain:  Current 6/10, worst 10/10, best 0/10   Location: bilateral posterior neck   Description: Sharp  Aggravating Factors: Cervical rotation  Easing Factors: Tylenol    Patient's goals: General strengthening    Objective     - Command followin% simple and complex     - Speech: no deficits     Mental status: alert, oriented to person, place, and time, normal mood, behavior, speech, dress, motor activity, and thought processes  Behavior:  calm, cooperative and adequate rapport can be established  Attention Span and Concentration:  Normal    Dominant hand:  right     Sensation: Proprioception: Intact    Tone: 0 - No increase in muscle tone  Limbs/muscles affected: Major muscle groups of bilateral lower extremity     Coordination:   - fine motor: Intact but patient reports difficulty with opposition  - UE  coordination: Intact finger-nose  - LE coordination:  Intact heel-shin and intact rapid alternating movement    ROM:   UPPER EXTREMITY--AROM/PROM  (R) UE: WFL  (L) UE: WFL         RANGE OF MOTION--LOWER EXTREMITIES  (R) LE WFL; mild hamstring, gastroc-soleus, and piriformis flexibility deficit  (L) LE: WFL; mild hamstring, gastroc-soleus, and piriformis flexibility deficit    Lower Extremity Strength   RLE LLE   Hip Flexion: 5/5 4+/5   Hip Extension:  4/5 4/5   Hip Abduction: 5/5 4+/5   Hip External Rotation: 5/5 4+/5   Knee Extension: 4/5 4/5   Knee Flexion: 4+/5 4+/5   Ankle Dorsiflexion: 4+/5 4+/5     Functional Gait Assessment:     1. Gait on level surface =  3   (3) Normal: less than 5.5 sec, no A.D., no imbalance, normal gait pattern, deviates <6in   (2) Mild impairment: 7-5.6 sec, uses A.D., mild gait deviations, or deviates 6-10 in   (1) Moderate impairment: > 7 sec, slow speed, imbalance, deviates 10-15 in.   (0) Severe impairment: needs assist, deviates >15 in, reach/touch wall  2. Change in Gait Speed = 2   (3) Normal: smooth change w/o loss of balance or gait deviation, deviates < 6 in, significant difference between speeds   (2) Mild impairment: changes speed, but demonstrates mild gait deviations, deviates 6-10 in, OR no deviations but unable to significantly speed, OR uses A.D.   (1) Moderate impairment: minor changes to speed, OR changes speed w/ significant deviations, deviates 10-15 in, OR  Changes speed , but loses balance & recovers   (0) Severe impairment: cannot change speed, deviates >15 in, or loses balance & needs assist  3. Gait with horizontal head turns  = 2   (3) Normal: no change in gait, deviates <6 in   (2) Mild impairment: slight change in speed, deviates 6-10 in, OR uses A.D.   (1) Moderate impairment: moderate change in speed, deviates 10-15 in   (0) Severe impairment: severe disruption of gait, deviates >15in  4. Gait with vertical head turns = 2   (3) Normal: no change in gait,  deviates <6 in   (2) Mild impairment: slight change in speed, deviates 6-10 in OR uses A.D.   (1) Moderate impairment: moderate change in speed, deviates 10-15 in   (0) Severe impairment: severe disruption of gait, deviates >15 in  5. Gait with pivot turns = 3   (3) Normal: performs safely in 3 sec, no LOB   (2) Mild impairment: performs in >3 sec & no LOB, OR turns safely & requires several steps to regain LOB   (1) Moderate impairment: turns slow, OR requires several small steps for balance following turn & stop   (0) Severe impairment: cannot turn safely, needs assist  6. Step over obstacle = 2   (3) Normal: steps over 2 stacked boxes w/o change in speed or LOB   (2) Mild impairment: able to step over 1 box w/o change in speed or LOB   (1) Moderate impairment: steps over 1 box but must slow down, may require VC   (0) Severe impairment: cannot perform w/o assist  7. Gait with Narrow ADARSH = 3   (3) Normal: 10 steps no staggering   (2) Mild impairment: 7-9 steps   (1) Moderate impairment: 4-7 steps   (0) Severe impairment: < 4 steps or cannot perform w/o assist  8. Gait with eyes closed = 1   (3) Normal: < 7 sec, no A.D., no LOB, normal gait pattern, deviates <6 in   (2) Mild impairment: 7.1-9 sec, mild gait deviations, deviates 6-10 in   (1) Moderate impairment: > 9 sec, abnormal pattern, LOB, deviates 10-15 in   (0) Severe impairment: cannot perform w/o assist, LOB, deviates >15in  9. Ambulating Backwards = 2   (3) Normal: no A.D., no LOB, normal gait pattern, deviates <6in   (2) Mild impairment: uses A.D., slower speed, mild gait deviations, deviates 6-10 in   (1) Moderate impairment: slow speed, abnormal gait pattern, LOB, deviates 10-15 in   (0) Severe impairment: severe gait deviations or LOB, deviates >15in  10. Steps = 2   (3) Normal: alternating feet, no rail   (2) Mild Impairment: alternating feet, uses rail   (1) Moderate impairment: step-to, uses rail   (0) Severe impairment: cannot perform  safely    Score 22/30     Cutoffs:   <22/30 fall risk in older adults  <18/30 fall risk in Parkinsons    MDC/MCID:  Stroke = 4.2 points (MDC)  Vestibular = 6 points (MDC)  Geriatric = 4 points (MCID)  Parkinson's = 4 points (MDC)    30-Second Sit to Stand: 8 repetitions with bilateral upper extremity push off     Six-Minute Walk Test: 1170 feet (RPE 4/10)     Gait Assessment:   - AD used: None  - Assistance: Independent  - Distance: See 6MWT  - Stairs: See FGA    Gait Analysis:  Deviations noted: No major abnormalities with exception of decreased self-selected walking speed    CMS Impairment/Limitation/Restriction for FOTO Stroke Lower Extremity Survey    Therapist reviewed FOTO scores for Darwin Beltrán on 3/14/2022.   FOTO documents entered into [employer] - see Media section.    Limitation Score: 46% (predicted 39%)       TREATMENT   Treatment Time In: 8:01AM  Treatment Time Out: 8:25AM  Total Treatment time separate from Evaluation: 24 minutes    Darwin received therapeutic exercises to develop strength, endurance and flexibility for 24 minutes including:  -- Patient education on ACSM / CDC guidelines for weekly physical activity  -- Patient education on definition of aerobic versus resistance activity and recommended exercise parameters  -- [facility] walking program  -- 3-way hip with form check (yellow theraband)  -- Runner's gastrocnemius stretch with form check  -- Standing heel raises with form check    Home Exercises and Patient Education Provided    Education provided:   - PT plan of care  - Possible need for blood pressure home unit in the future  - HEP instruction    Written Home Exercises Provided: yes.  Exercises were reviewed and Darwin was able to demonstrate them prior to the end of the session.  Darwin demonstrated good  understanding of the education provided.     See EMR under Patient Instructions for exercises provided 3/14/2022.    Assessment   Darwin is a 51 y.o. female referred to outpatient  Physical Therapy with a medical diagnosis of R53.1 (ICD-10-CM) - Left-sided weakness. Patient presents with bilateral lower extremity muscular strength and power deficits; higher level balance impairments; reduced self-selected walking speed; impaired cardiovascular endurance; sedentary lifestyle; and reduced self-perception of functional mobility. She would benefit from and is appropriate for skilled physical therapy services to address listed deficits, receive education and intervention geared toward secondary stroke prevention, and improve independence with lifelong commitment to physical activity.    Patient prognosis is Good.   Patient will benefit from skilled outpatient Physical Therapy to address the deficits stated above and in the chart below, provide patient/family education, and to maximize patient's level of independence.     Plan of care discussed with patient: Yes  Patient's spiritual, cultural and educational needs considered and patient is agreeable to the plan of care and goals as stated below:     Anticipated Barriers for therapy: Chronic neck and low back pain    Medical Necessity is demonstrated by the following  History  Co-morbidities and personal factors that may impact the plan of care Co-morbidities:   Chronic migraine   Elevated blood pressure reading in office without diagnosis of hypertension   HTN (hypertension)   Cynthia     Personal Factors:   lifestyle     high   Examination  Body Structures and Functions, activity limitations and participation restrictions that may impact the plan of care Body Regions:   lower extremities  upper extremities  trunk    Body Systems:    gross symmetry  ROM  strength  balance  gait  transfers  transitions  motor control  motor learning  blood pressure    Participation Restrictions:   Decreased quality of life secondary to impairments     Activity limitations:   Learning and applying knowledge  no deficits    General Tasks and Commands  no  deficits    Communication  no deficits    Mobility  lifting and carrying objects  walking    Self care  looking after one's health    Domestic Life  doing house work (cleaning house, washing dishes, laundry)  assisting others    Interactions/Relationships  no deficits    Life Areas  employment    Community and Social Life  community life  recreation and leisure         high   Clinical Presentation stable and uncomplicated low   Decision Making/ Complexity Score: low     Goals:  Short Term Goals: 3 weeks   1. Patient will be compliant with HEP in order to maximize PT benefits  2. Patient will score >/= 10 repetitions on 30-Second Sit to  order to improve BLE endurance and muscular power for transfers   3. Patient will walk >/= 1300 feet on Six-Minute Walk Test indoors in order to improve endurance for home mobility     Long Term Goals: 6 weeks   4. Patient will score </= 39% on FOTO limitation survey in order to improve self-perception of functional mobility deficits  5. Patient will improve bilateral lower extremity MMT grades by >/=1/2 grade in order to improve strength for ADL completion  6. Patient will score >/= 25/30 on FGA in order to reduce risk for falls and improve postural control  7. Patient will score >/= 12 repetitions on 30-Second Sit to  order to improve BLE endurance and muscular power for transfers   8. Patient will walk >/= 1500 feet on Six-Minute Walk Test outdoors over multiple levels/surfaces in order to improve endurance for community mobility   9. Patient will begin some form of home/community fitness in order to sustain progress gained in PT    Plan   Plan of care Certification: 3/14/2022 to 4/29/2022.    Outpatient Physical Therapy 2 times weekly for 6 weeks to include the following interventions: Gait Training, Manual Therapy, Moist Heat/ Ice, Neuromuscular Re-ed, Patient Education, Self Care, Therapeutic Activities, Therapeutic Exercise and Modalities PRN.     Suggestions  for follow up = Treadmill; Cybex machinery; standing strengthening and stretching; higher level balance activity    CHRISTIANO BLANCHARD PT

## 2022-03-21 ENCOUNTER — CLINICAL SUPPORT (OUTPATIENT)
Dept: REHABILITATION | Facility: HOSPITAL | Age: 52
End: 2022-03-21
Payer: MEDICARE

## 2022-03-21 DIAGNOSIS — Z74.09 IMPAIRED FUNCTIONAL MOBILITY, BALANCE, GAIT, AND ENDURANCE: Primary | ICD-10-CM

## 2022-03-21 PROCEDURE — 97110 THERAPEUTIC EXERCISES: CPT | Mod: PN

## 2022-03-21 PROCEDURE — 97112 NEUROMUSCULAR REEDUCATION: CPT | Mod: PN

## 2022-03-21 NOTE — PROGRESS NOTES
[facility]   Physical Therapy Treatment Note     Name: Darwin Beltrán  Clinic Number: [id number]    Therapy Diagnosis:   Encounter Diagnosis   Name Primary?    Impaired functional mobility, balance, gait, and endurance Yes     Physician: Tadeo Pulliam MD    Visit Date: 3/21/2022    Physician Orders: PT Eval and Treat   Medical Diagnosis from Referral: R53.1 (ICD-10-CM) - Left-sided weakness  Evaluation Date: 3/14/2022  Authorization Period Expiration: 03/28/2022  Plan of Care Expiration: 4/29/2022  Visit # / Visits authorized: 1/ 6  PTA Visit #: 0/5     Time In: 7:15  Time Out: 7:45  Total Billable Time: 27 minutes    Precautions: Standard and Moy[place], Hypertension    SUBJECTIVE     Pt reports: mild pain in her left knees this monring. Denies any falls since the evaluation.  She was compliant with home exercise program.  Response to previous treatment: Initial evaluation.  Functional change: ongoing    Pain: 3/10  Location: Bilateral knees    OBJECTIVE     Objective Measures updated at progress report unless specified.     Treatment     Darwin received the treatments listed below:      Therapeutic Exercises to develop strength, endurance, ROM and core stabilization for 27 minutes including:  Nu-step - Level 4; 8 minutes  Leg Press - double leg (5 plates x10); single leg (3 plates; x10 bilateral)  3-way Hip Kicks - at ballet bar with yellow TB; x10 in all directions.    Patient Education and Home Exercises     Home Exercises Provided and Patient Education Provided     Education provided:   Symptom management and plan of care progressions.  Home Exercise Program.    Written Home Exercises Provided: Patient instructed to cont prior HEP. Exercises were reviewed and Darwin was able to demonstrate them prior to the end of the session.  Darwin demonstrated good  understanding of the education provided. See EMR under Patient Instructions for exercises provided during therapy  sessions    ASSESSMENT     Darwin presents back to the clinic for her first follow-up visit. Began with cardivascular warm-up followed by lower extremity strengthening secondary to complaints of weakness and poor balance in the lower extremities. Covered for primary treating therapist while awaiting for her to arrive. Mild fatigue with treatments with no exacerbation in symptoms. Remaining treatment completed by primary therapist Isabela Vera PT.     Darwin Is progressing well towards her goals.   Pt prognosis is Good.     Pt will continue to benefit from skilled outpatient physical therapy to address the deficits listed in the problem list box on initial evaluation, provide pt/family education and to maximize pt's level of independence in the home and community environment.   Pt's spiritual, cultural and educational needs considered and pt agreeable to plan of care and goals.     Anticipated barriers to physical therapy: Chronic neck and low back pain.    Goals:  Short Term Goals: 3 weeks   1. Patient will be compliant with HEP in order to maximize PT benefits  2. Patient will score >/= 10 repetitions on 30-Second Sit to  order to improve BLE endurance and muscular power for transfers   3. Patient will walk >/= 1300 feet on Six-Minute Walk Test indoors in order to improve endurance for home mobility      Long Term Goals: 6 weeks   4. Patient will score </= 39% on FOTO limitation survey in order to improve self-perception of functional mobility deficits  5. Patient will improve bilateral lower extremity MMT grades by >/=1/2 grade in order to improve strength for ADL completion  6. Patient will score >/= 25/30 on FGA in order to reduce risk for falls and improve postural control  7. Patient will score >/= 12 repetitions on 30-Second Sit to  order to improve BLE endurance and muscular power for transfers   8. Patient will walk >/= 1500 feet on Six-Minute Walk Test outdoors over multiple  levels/surfaces in order to improve endurance for community mobility   9. Patient will begin some form of home/community fitness in order to sustain progress gained in PT    PLAN     Continue with documented plan of care, and progress patient as indicated.    Plan of care Certification: 3/14/2022 to 4/29/2022.  Outpatient Physical Therapy 2 times weekly for 6 weeks     Navdeep Robertson, PT , DPT, OCS

## 2022-03-21 NOTE — PROGRESS NOTES
"[facility]   Physical Therapy Treatment Note     Name: Darwin Beltrán  Clinic Number: [id number]    Therapy Diagnosis:   Encounter Diagnosis   Name Primary?    Impaired functional mobility, balance, gait, and endurance Yes     Physician: Tadeo Pulliam MD    Visit Date: 3/21/2022    Physician Orders: PT Eval and Treat   Medical Diagnosis from Referral: R53.1 (ICD-10-CM) - Left-sided weakness  Evaluation Date: 3/14/2022  Authorization Period Expiration: 03/28/2022  Plan of Care Expiration: 4/29/2022  Visit # / Visits authorized: 2/ 6    PTA Visit #: 0/5     Time In: 7:46AM (session taken over from Navdeep Robertson, PT, DPT, OCS)  Time Out: 8:00AM  Total Billable Time: 14 minutes (1 NMR)    Precautions: Standard and Moy[place]oya, Hypertension    SUBJECTIVE     Patient reports: No major changes from initial evaluation. Mild pain in her right lower extremity persists. She has been keeping up with HEP to some extent.  She was partially compliant with home exercise program.  Response to previous treatment: Last session was initial evaluation  Functional change: Last session was initial evaluation    Pain: 3/10  Location: right anterior lower leg     OBJECTIVE     Objective Measures updated at progress report unless specified.     Treatment     Darwin received the treatments listed below:      therapeutic exercises to develop strength, endurance, ROM and flexibility for 00 minutes including:  Not today    neuromuscular re-education activities to improve: Balance, Coordination, Kinesthetic, Sense and Proprioception for 14 minutes. The following activities were included:  -- Semi-tandem stance with posterior leg on airex and anterior leg on 6" step + red med ball chest press x10B  -- Reciprocal step ups from airex to 6" step x10B  -- Tandem walking on airex beam (2) x 4 lengths x 10 feet each  -- Toe taps to cones (6) + lateral side stepping x 4 lengths x 10 feet each  -- Zain (5) step overs " (forward/reciprocal) x 4 lengths x 10 feet each    therapeutic activities to improve functional performance for 00 minutes, including:  Not today    gait training to improve functional mobility and safety for 00 minutes, including:  Not today    Patient Education and Home Exercises     Home Exercises Provided and Patient Education Provided     Education provided:   - Continue HEP    Written Home Exercises Provided: Patient instructed to cont prior HEP. Exercises were reviewed and Darwin was able to demonstrate them prior to the end of the session.  Darwin demonstrated good  understanding of the education provided. See EMR under Patient Instructions for exercises provided during therapy sessions    ASSESSMENT     Session initiated by Navdeep Robertson, PT, DPT, OCS. Final portion of session dedicated to balance intervention. Patient was able to appropriately minimize upper extremity support for good challenge to postural control. No losses of balance noted. She would benefit from balance progressions at future visits to decrease risk for future falls and improve dynamic postural control.    Darwin Is progressing well towards her goals.   Pt prognosis is Good.     Pt will continue to benefit from skilled outpatient physical therapy to address the deficits listed in the problem list box on initial evaluation, provide pt/family education and to maximize pt's level of independence in the home and community environment.     Pt's spiritual, cultural and educational needs considered and pt agreeable to plan of care and goals.     Anticipated barriers to physical therapy: Chronic neck and low back pain    Goals:     Short Term Goals: 3 weeks   1. Patient will be compliant with HEP in order to maximize PT benefits (progressing, not met)  2. Patient will score >/= 10 repetitions on 30-Second Sit to  order to improve BLE endurance and muscular power for transfers (progressing, not met)  3. Patient will walk >/= 1300 feet on  Six-Minute Walk Test indoors in order to improve endurance for home mobility (progressing, not met)     Long Term Goals: 6 weeks   4. Patient will score </= 39% on FOTO limitation survey in order to improve self-perception of functional mobility deficits (progressing, not met)  5. Patient will improve bilateral lower extremity MMT grades by >/=1/2 grade in order to improve strength for ADL completion (progressing, not met)  6. Patient will score >/= 25/30 on FGA in order to reduce risk for falls and improve postural control (progressing, not met)  7. Patient will score >/= 12 repetitions on 30-Second Sit to  order to improve BLE endurance and muscular power for transfers (progressing, not met)  8. Patient will walk >/= 1500 feet on Six-Minute Walk Test outdoors over multiple levels/surfaces in order to improve endurance for community mobility (progressing, not met)  9. Patient will begin some form of home/community fitness in order to sustain progress gained in PT (progressing, not met)      PLAN     Continue to progress as per plan of care     CHRISTIANO BLANCHARD, PT

## 2022-03-23 ENCOUNTER — CLINICAL SUPPORT (OUTPATIENT)
Dept: REHABILITATION | Facility: HOSPITAL | Age: 52
End: 2022-03-23
Payer: MEDICARE

## 2022-03-23 DIAGNOSIS — Z74.09 IMPAIRED FUNCTIONAL MOBILITY, BALANCE, GAIT, AND ENDURANCE: Primary | ICD-10-CM

## 2022-03-23 PROCEDURE — 97110 THERAPEUTIC EXERCISES: CPT | Mod: PN

## 2022-03-23 PROCEDURE — 97112 NEUROMUSCULAR REEDUCATION: CPT | Mod: PN

## 2022-03-23 NOTE — PROGRESS NOTES
" [facility]   Physical Therapy Treatment Note     Name: Darwin Beltrán  Clinic Number: [id number]    Therapy Diagnosis:   Encounter Diagnosis   Name Primary?    Impaired functional mobility, balance, gait, and endurance Yes     Physician: Tadeo Pulliam MD    Visit Date: 3/23/2022    Physician Orders: PT Eval and Treat   Medical Diagnosis from Referral: R53.1 (ICD-10-CM) - Left-sided weakness  Evaluation Date: 3/14/2022  Authorization Period Expiration: 03/28/2022  Plan of Care Expiration: 4/29/2022  Visit # / Visits authorized: 2/ 6    PTA Visit #: 0/5     Time In: 7:17 AM   Time Out: 8:00 AM  Total Billable Time: 43 minutes (2 TE,1 NMR)    Precautions: Standard and [place], Hypertension    SUBJECTIVE     Patient reports: No major changes from initial evaluation. Mild pain in her right lower extremity persists. She has been keeping up with HEP to some extent.  She was partially compliant with home exercise program.  Response to previous treatment: mild muscle fatigue  Functional change: none reported    Pain: 3/10  Location: right anterior lower leg     OBJECTIVE     Objective Measures updated at progress report unless specified.     Treatment     Darwin received the treatments listed below:      therapeutic exercises to develop strength, endurance, ROM and flexibility for 33 minutes including:    - Nu-step - Level 4; 8 minutes  - 3-way Hip Kicks - at ballet bar with yellow TB, stance leg on green oval; 2 x10 in all directions.  - heel raises off green oval 2 x 15  - 6" forward step ups x 10 bilaterally  - 6" forward step downs x 10 bilaterally  - 2 over ground laps with 5# weight in right hand        neuromuscular re-education activities to improve: Balance, Coordination, Kinesthetic, Sense and Proprioception for 10 minutes. The following activities were included:  -- tandem stance with  red med ball shoulder press x10 B  -- SLS with red med ball chest press 2 x 10 " "bilaterally    Not performed:  -- Reciprocal step ups from airex to 6" step x10B  -- Tandem walking on airex beam (2) x 4 lengths x 10 feet each  -- Toe taps to cones (6) + lateral side stepping x 4 lengths x 10 feet each  -- Zain (5) step overs (forward/reciprocal) x 4 lengths x 10 feet each    therapeutic activities to improve functional performance for 00 minutes, including:  Not today    gait training to improve functional mobility and safety for 00 minutes, including:  Not today    Patient Education and Home Exercises     Home Exercises Provided and Patient Education Provided     Education provided:   - Continue HEP    Written Home Exercises Provided: Patient instructed to cont prior HEP. Exercises were reviewed and Darwin was able to demonstrate them prior to the end of the session.  Darwin demonstrated good  understanding of the education provided. See EMR under Patient Instructions for exercises provided during therapy sessions    ASSESSMENT     Patient was able to perform step ups/downs without upper extremity support for good challenge to postural control. No losses of balance noted. She would benefit from balance progressions at future visits to decrease risk for future falls and improve dynamic postural control.    Darwin Is progressing well towards her goals.   Pt prognosis is Good.     Pt will continue to benefit from skilled outpatient physical therapy to address the deficits listed in the problem list box on initial evaluation, provide pt/family education and to maximize pt's level of independence in the home and community environment.     Pt's spiritual, cultural and educational needs considered and pt agreeable to plan of care and goals.     Anticipated barriers to physical therapy: Chronic neck and low back pain    Goals:     Short Term Goals: 3 weeks   1. Patient will be compliant with HEP in order to maximize PT benefits (progressing, not met)  2. Patient will score >/= 10 repetitions on " 30-Second Sit to  order to improve BLE endurance and muscular power for transfers (progressing, not met)  3. Patient will walk >/= 1300 feet on Six-Minute Walk Test indoors in order to improve endurance for home mobility (progressing, not met)     Long Term Goals: 6 weeks   4. Patient will score </= 39% on FOTO limitation survey in order to improve self-perception of functional mobility deficits (progressing, not met)  5. Patient will improve bilateral lower extremity MMT grades by >/=1/2 grade in order to improve strength for ADL completion (progressing, not met)  6. Patient will score >/= 25/30 on FGA in order to reduce risk for falls and improve postural control (progressing, not met)  7. Patient will score >/= 12 repetitions on 30-Second Sit to  order to improve BLE endurance and muscular power for transfers (progressing, not met)  8. Patient will walk >/= 1500 feet on Six-Minute Walk Test outdoors over multiple levels/surfaces in order to improve endurance for community mobility (progressing, not met)  9. Patient will begin some form of home/community fitness in order to sustain progress gained in PT (progressing, not met)      PLAN     Continue to progress as per plan of care     Shannan Rose, PT

## 2022-03-28 ENCOUNTER — CLINICAL SUPPORT (OUTPATIENT)
Dept: REHABILITATION | Facility: HOSPITAL | Age: 52
End: 2022-03-28
Payer: MEDICARE

## 2022-03-28 DIAGNOSIS — Z74.09 IMPAIRED FUNCTIONAL MOBILITY, BALANCE, GAIT, AND ENDURANCE: Primary | ICD-10-CM

## 2022-03-28 PROCEDURE — 97110 THERAPEUTIC EXERCISES: CPT | Mod: PN

## 2022-03-28 PROCEDURE — 97112 NEUROMUSCULAR REEDUCATION: CPT | Mod: PN

## 2022-03-28 NOTE — PROGRESS NOTES
" [facility]   Physical Therapy Treatment Note     Name: Darwin Beltrán  Clinic Number: [id number]    Therapy Diagnosis:   Encounter Diagnosis   Name Primary?    Impaired functional mobility, balance, gait, and endurance Yes     Physician: aTdeo Pulliam MD    Visit Date: 3/28/2022    Physician Orders: PT Eval and Treat   Medical Diagnosis from Referral: R53.1 (ICD-10-CM) - Left-sided weakness  Evaluation Date: 3/14/2022  Authorization Period Expiration: 03/28/2022  Plan of Care Expiration: 4/29/2022  Visit # / Visits authorized: 4/ 6  FOTO: 4/5    PTA Visit #: 0/5     Time In: 7:14 AM   Time Out: 7:56 AM  Total Billable Time: 42 minutes (2 TE,1 NMR)    Precautions: Standard and [place], Hypertension    SUBJECTIVE     Patient reports: No pain today. She is agreeable to PT today.  She was partially compliant with home exercise program.  Response to previous treatment: No adverse response  Functional change: None reported yet    Pain: 0/10  Location: right anterior lower leg     OBJECTIVE     Objective Measures updated at progress report unless specified.     Six-Minute Walk Test: 1292 feet without assistive device (evaluation distance = 1170 feet)    Treatment     Darwin received the treatments listed below:      therapeutic exercises to develop strength, endurance, ROM and flexibility for 32 minutes including:  - Nu-step - Level 4.5; 7 minutes  - Cybex leg press 2x10 bilateral lower extremity (5 plates) x10 unilateral each leg (3 plates)  - Standing hip abduction yellow theraband 2x15B  - Six-Minute Walk Test (see above)  - heel raises off green oval 2 x 15    NOT TODAY  - 6" forward step ups x 10 bilaterally  - 6" forward step downs x 10 bilaterally  - 2 over ground laps with 5# weight in right hand    neuromuscular re-education activities to improve: Balance, Coordination, Kinesthetic, Sense and Proprioception for 10 minutes. The following activities were included:  -- Single " "leg stance with red med ball chest press 2 x 10 bilaterally  -- Narrow base of support / on airex / eyes closed 3x30"    NOT TODAY  -- tandem stance with  red med ball shoulder press x10 B  -- Reciprocal step ups from airex to 6" step x10B  -- Tandem walking on airex beam (2) x 4 lengths x 10 feet each  -- Toe taps to cones (6) + lateral side stepping x 4 lengths x 10 feet each  -- Zain (5) step overs (forward/reciprocal) x 4 lengths x 10 feet each    therapeutic activities to improve functional performance for 00 minutes, including:  Not today    gait training to improve functional mobility and safety for 00 minutes, including:  Not today    Patient Education and Home Exercises     Home Exercises Provided and Patient Education Provided     Education provided:   - Continue HEP    Written Home Exercises Provided: Patient instructed to cont prior HEP. Exercises were reviewed and Darwin was able to demonstrate them prior to the end of the session.  Darwin demonstrated good  understanding of the education provided. See EMR under Patient Instructions for exercises provided during therapy sessions    ASSESSMENT     Patient tolerated session without adverse response. Since initial evaluation, slight improvement noted in gait speed over community distances with ~100-foot improvement on Six-Minute Walk Test. She would benefit from continued HEP reinforcement and progressions within clinic in terms of strength and higher level balance skills to achieve functional goals.    Darwin Is progressing well towards her goals.   Pt prognosis is Good.     Pt will continue to benefit from skilled outpatient physical therapy to address the deficits listed in the problem list box on initial evaluation, provide pt/family education and to maximize pt's level of independence in the home and community environment.     Pt's spiritual, cultural and educational needs considered and pt agreeable to plan of care and goals.     Anticipated barriers " to physical therapy: Chronic neck and low back pain    Goals:     Short Term Goals: 3 weeks   1. Patient will be compliant with HEP in order to maximize PT benefits (progressing, not met)  2. Patient will score >/= 10 repetitions on 30-Second Sit to  order to improve BLE endurance and muscular power for transfers (progressing, not met)  3. Patient will walk >/= 1300 feet on Six-Minute Walk Test indoors in order to improve endurance for home mobility (progressing, not met)     Long Term Goals: 6 weeks   4. Patient will score </= 39% on FOTO limitation survey in order to improve self-perception of functional mobility deficits (progressing, not met)  5. Patient will improve bilateral lower extremity MMT grades by >/=1/2 grade in order to improve strength for ADL completion (progressing, not met)  6. Patient will score >/= 25/30 on FGA in order to reduce risk for falls and improve postural control (progressing, not met)  7. Patient will score >/= 12 repetitions on 30-Second Sit to  order to improve BLE endurance and muscular power for transfers (progressing, not met)  8. Patient will walk >/= 1500 feet on Six-Minute Walk Test outdoors over multiple levels/surfaces in order to improve endurance for community mobility (progressing, not met)  9. Patient will begin some form of home/community fitness in order to sustain progress gained in PT (progressing, not met)      PLAN     Continue to progress as per plan of care     CHRISTIANO BLANCHARD, PT

## 2022-03-30 ENCOUNTER — CLINICAL SUPPORT (OUTPATIENT)
Dept: REHABILITATION | Facility: HOSPITAL | Age: 52
End: 2022-03-30
Payer: MEDICARE

## 2022-03-30 DIAGNOSIS — Z74.09 IMPAIRED FUNCTIONAL MOBILITY, BALANCE, GAIT, AND ENDURANCE: Primary | ICD-10-CM

## 2022-03-30 PROCEDURE — 97112 NEUROMUSCULAR REEDUCATION: CPT | Mod: PN

## 2022-03-30 PROCEDURE — 97110 THERAPEUTIC EXERCISES: CPT | Mod: PN

## 2022-03-30 NOTE — PROGRESS NOTES
"  [facility]   Physical Therapy Treatment Note     Name: Darwin Beltrán  Clinic Number: [id number]    Therapy Diagnosis:   Encounter Diagnosis   Name Primary?    Impaired functional mobility, balance, gait, and endurance Yes     Physician: Tadeo Pulliam MD    Visit Date: 3/30/2022    Physician Orders: PT Eval and Treat   Medical Diagnosis from Referral: R53.1 (ICD-10-CM) - Left-sided weakness  Evaluation Date: 3/14/2022  Authorization Period Expiration: 03/28/2022  Plan of Care Expiration: 4/29/2022  Visit # / Visits authorized: 5/ 6  FOTO: 5/5 (completed 3/30/22)    PTA Visit #: 0/5     Time In: 7:17 AM   Time Out: 8:00 AM  Total Billable Time: 42 minutes (1 TE,2 NMR)    Precautions: Standard and [place], Hypertension    SUBJECTIVE     Patient reports: No pain today. She is agreeable to PT today.  She was partially compliant with home exercise program.  Response to previous treatment: No adverse response  Functional change: None reported yet    Pain: 0/10  Location: right anterior lower leg     OBJECTIVE     Objective Measures updated at progress report unless specified.     Six-Minute Walk Test: 1292 feet without assistive device (evaluation distance = 1170 feet)    Treatment     Darwin received the treatments listed below:      therapeutic exercises to develop strength, endurance, ROM and flexibility for 18 minutes including:  - Nu-step - Level 5; 7 minutes - hills course  - Cybex leg press 2x10 bilateral lower extremity (5 plates) x10 unilateral each leg (3 plates)    Not performed:  - Standing hip abduction yellow theraband 2x15B  - heel raises off green oval 2 x 15    NOT TODAY  - 6" forward step ups x 10 bilaterally  - 6" forward step downs x 10 bilaterally  - 2 over ground laps with 5# weight in right hand    neuromuscular re-education activities to improve: Balance, Coordination, Kinesthetic, Sense and Proprioception for 24 minutes. The following activities were " "included:  -- Tandem walking on airex beam (2) x 4 lengths x 10 feet each  -- side stepping on airex beam with hurdles x 6 lengths  -- marching with opposite hand to knee taps 45' x 2  -- walking with horizontal head turns 45'  -- walking with vertical head turns 45'     NOT TODAY  -- Single leg stance with red med ball chest press 2 x 10 bilaterally  -- Narrow base of support / on airex / eyes closed 3x30"  -- tandem stance with  red med ball shoulder press x10 B  -- Reciprocal step ups from airex to 6" step x10B    -- Toe taps to cones (6) + lateral side stepping x 4 lengths x 10 feet each  -- Zain (5) step overs (forward/reciprocal) x 4 lengths x 10 feet each    therapeutic activities to improve functional performance for 00 minutes, including:  Not today    gait training to improve functional mobility and safety for 00 minutes, including:  Not today    Patient Education and Home Exercises     Home Exercises Provided and Patient Education Provided     Education provided:   - Continue HEP    Written Home Exercises Provided: Patient instructed to cont prior HEP. Exercises were reviewed and Darwin was able to demonstrate them prior to the end of the session.  Darwin demonstrated good  understanding of the education provided. See EMR under Patient Instructions for exercises provided during therapy sessions    ASSESSMENT     Patient tolerated session without adverse response. Pt required occasional single upper extremity support when walking on compliant surfaces but was able to complete all balance activity without loss of balance.  She would benefit from continued HEP reinforcement and progressions within clinic in terms of strength and higher level balance skills to achieve functional goals.    Darwin Is progressing well towards her goals.   Pt prognosis is Good.     Pt will continue to benefit from skilled outpatient physical therapy to address the deficits listed in the problem list box on initial evaluation, " provide pt/family education and to maximize pt's level of independence in the home and community environment.     Pt's spiritual, cultural and educational needs considered and pt agreeable to plan of care and goals.     Anticipated barriers to physical therapy: Chronic neck and low back pain    Goals:     Short Term Goals: 3 weeks   1. Patient will be compliant with HEP in order to maximize PT benefits (progressing, not met)  2. Patient will score >/= 10 repetitions on 30-Second Sit to  order to improve BLE endurance and muscular power for transfers (progressing, not met)  3. Patient will walk >/= 1300 feet on Six-Minute Walk Test indoors in order to improve endurance for home mobility (progressing, not met)     Long Term Goals: 6 weeks   4. Patient will score </= 39% on FOTO limitation survey in order to improve self-perception of functional mobility deficits (progressing, not met)  5. Patient will improve bilateral lower extremity MMT grades by >/=1/2 grade in order to improve strength for ADL completion (progressing, not met)  6. Patient will score >/= 25/30 on FGA in order to reduce risk for falls and improve postural control (progressing, not met)  7. Patient will score >/= 12 repetitions on 30-Second Sit to  order to improve BLE endurance and muscular power for transfers (progressing, not met)  8. Patient will walk >/= 1500 feet on Six-Minute Walk Test outdoors over multiple levels/surfaces in order to improve endurance for community mobility (progressing, not met)  9. Patient will begin some form of home/community fitness in order to sustain progress gained in PT (progressing, not met)      PLAN     Continue to progress as per plan of care     Shannan Rose, PT

## 2022-04-04 ENCOUNTER — CLINICAL SUPPORT (OUTPATIENT)
Dept: REHABILITATION | Facility: HOSPITAL | Age: 52
End: 2022-04-04
Payer: MEDICARE

## 2022-04-04 DIAGNOSIS — Z74.09 IMPAIRED FUNCTIONAL MOBILITY, BALANCE, GAIT, AND ENDURANCE: Primary | ICD-10-CM

## 2022-04-04 PROCEDURE — 97110 THERAPEUTIC EXERCISES: CPT | Mod: PN

## 2022-04-04 NOTE — PROGRESS NOTES
[facility]   Physical Therapy Treatment Note     Name: Darwin Beltrán  Clinic Number: [id number]    Therapy Diagnosis:   Encounter Diagnosis   Name Primary?    Impaired functional mobility, balance, gait, and endurance Yes     Physician: Tadeo Pulliam MD    Visit Date: 4/4/2022    Physician Orders: PT Eval and Treat   Medical Diagnosis from Referral: R53.1 (ICD-10-CM) - Left-sided weakness  Evaluation Date: 3/14/2022  Authorization Period Expiration: 03/28/2022  Plan of Care Expiration: 4/29/2022  Visit # / Visits authorized: 6/ 6  FOTO: 6/10 (completed 3/30/22)    PTA Visit #: 0/5     Time In: 7:15 AM   Time Out: 7:55 AM  Total Billable Time: 40 minutes (3TE)    Precautions: Standard and [place], Hypertension    SUBJECTIVE     Patient reports: No new complaints. Feels like her left leg especially has been getting stronger since starting PT. Still concerned about her balance.  She was compliant with home exercise program.  Response to previous treatment: No adverse response  Functional change: None reported yet    Pain: 0/10  Location: right anterior lower leg     OBJECTIVE     Objective Measures updated at progress report unless specified.     Six-Minute Walk Test (3/28/2022): 1292 feet without assistive device (evaluation distance = 1170 feet)    Lower Extremity Strength (4/4/2022 - updated values in parentheses)    RLE LLE   Hip Flexion: 5/5 (5/5) 4+/5 (5/5)   Hip Extension:  4/5 (4+/5) 4/5 (4+/5)   Hip Abduction: 5/5 (5/5) 4+/5 (5/5)   Hip External Rotation: 5/5 (5/5) 4+/5 (5/5)   Knee Extension: 4/5 (4+/5) 4/5 (4+/5)   Knee Flexion: 4+/5 (5/5) 4+/5 (5/5)   Ankle Dorsiflexion: 4+/5 (5/5) 4+/5 (5/5)        Treatment     Darwin received the treatments listed below:      therapeutic exercises to develop strength, endurance, ROM and flexibility for 40 minutes including:  - Nu-step - Level 6; 7 minutes - hills course  - Cybex leg press 2x10 bilateral lower extremity (6  "plates) x10 unilateral each leg (4 plates)  - Manual muscle testing (see above)  - Reciprocal step ups to 1st stair with 7# in each hand 2x10B  - Monster walks yellow theraband around ankles x 6 lengths x 8 feet each   - heel raises off green oval 2 x 15    NOT TODAY  - 6" forward step ups x 10 bilaterally  - 6" forward step downs x 10 bilaterally  - 2 over ground laps with 5# weight in right hand    neuromuscular re-education activities to improve: Balance, Coordination, Kinesthetic, Sense and Proprioception for 24 minutes. The following activities were included:  -- Tandem walking on airex beam (2) x 4 lengths x 10 feet each  -- side stepping on airex beam with hurdles x 6 lengths  -- marching with opposite hand to knee taps 45' x 2  -- walking with horizontal head turns 45'  -- walking with vertical head turns 45'     NOT TODAY  -- Single leg stance with red med ball chest press 2 x 10 bilaterally  -- Narrow base of support / on airex / eyes closed 3x30"  -- tandem stance with  red med ball shoulder press x10 B  -- Reciprocal step ups from airex to 6" step x10B  -- Toe taps to cones (6) + lateral side stepping x 4 lengths x 10 feet each  -- Zain (5) step overs (forward/reciprocal) x 4 lengths x 10 feet each    therapeutic activities to improve functional performance for 00 minutes, including:  Not today    gait training to improve functional mobility and safety for 00 minutes, including:  Not today    Patient Education and Home Exercises     Home Exercises Provided and Patient Education Provided     Education provided:   - Continue HEP    Written Home Exercises Provided: Patient instructed to cont prior HEP. Exercises were reviewed and Darwin was able to demonstrate them prior to the end of the session.  Darwin demonstrated good  understanding of the education provided. See EMR under Patient Instructions for exercises provided during therapy sessions    ASSESSMENT     Patient tolerated session very well with " appropriate levels of fatigue achieved. Patient receptive to verbal cuing to minimize compensatory patterns with exercise. Brief reassessment performed and patient demonstrated improved bilateral lower extremity strength since initial evaluation. Discharge planning discussed but patient resistant as she wants to improve balance confidence. Plan to continued strengthening/endurance program with additional higher level balance interventions to address balance confidence.     Darwin Is progressing well towards her goals.   Pt prognosis is Good.     Pt will continue to benefit from skilled outpatient physical therapy to address the deficits listed in the problem list box on initial evaluation, provide pt/family education and to maximize pt's level of independence in the home and community environment.     Pt's spiritual, cultural and educational needs considered and pt agreeable to plan of care and goals.     Anticipated barriers to physical therapy: Chronic neck and low back pain    Goals:     Short Term Goals: 3 weeks   1. Patient will be compliant with HEP in order to maximize PT benefits MET  2. Patient will score >/= 10 repetitions on 30-Second Sit to  order to improve BLE endurance and muscular power for transfers (progressing, not met)  3. Patient will walk >/= 1300 feet on Six-Minute Walk Test indoors in order to improve endurance for home mobility (progressing, not met)     Long Term Goals: 6 weeks   4. Patient will score </= 39% on FOTO limitation survey in order to improve self-perception of functional mobility deficits (progressing, not met)  5. Patient will improve bilateral lower extremity MMT grades by >/=1/2 grade in order to improve strength for ADL completion MET  6. Patient will score >/= 25/30 on FGA in order to reduce risk for falls and improve postural control (progressing, not met)  7. Patient will score >/= 12 repetitions on 30-Second Sit to  order to improve BLE endurance and  muscular power for transfers (progressing, not met)  8. Patient will walk >/= 1500 feet on Six-Minute Walk Test outdoors over multiple levels/surfaces in order to improve endurance for community mobility (progressing, not met)  9. Patient will begin some form of home/community fitness in order to sustain progress gained in PT (progressing, not met)      PLAN     Continue to progress as per plan of care; include additional higher level balance interventions to improve balance confidence    CHRISTIANO BLANCHARD, PT

## 2022-04-05 ENCOUNTER — CLINICAL SUPPORT (OUTPATIENT)
Dept: REHABILITATION | Facility: HOSPITAL | Age: 52
End: 2022-04-05
Payer: MEDICARE

## 2022-04-05 DIAGNOSIS — Z74.09 IMPAIRED FUNCTIONAL MOBILITY, BALANCE, GAIT, AND ENDURANCE: Primary | ICD-10-CM

## 2022-04-05 PROCEDURE — 97112 NEUROMUSCULAR REEDUCATION: CPT | Mod: PN

## 2022-04-05 PROCEDURE — 97110 THERAPEUTIC EXERCISES: CPT | Mod: PN

## 2022-04-05 NOTE — PROGRESS NOTES
[facility]   Physical Therapy Treatment Note     Name: Darwin Beltrán  Clinic Number: [id number]    Therapy Diagnosis:   Encounter Diagnosis   Name Primary?    Impaired functional mobility, balance, gait, and endurance Yes     Physician: Tadeo Pulliam MD    Visit Date: 4/5/2022    Physician Orders: PT Eval and Treat   Medical Diagnosis from Referral: R53.1 (ICD-10-CM) - Left-sided weakness  Evaluation Date: 3/14/2022  Authorization Period Expiration: 03/28/2022  Plan of Care Expiration: 4/29/2022  Visit # / Visits authorized: 7/ 6  FOTO: 6/10 (completed 3/30/22)    PTA Visit #: 0/5     Time In: 7:15 AM   Time Out: 7:58 AM  Total Billable Time: 43 minutes (1TE, 2NMR)    Precautions: Standard and [place], Hypertension    SUBJECTIVE     Patient reports: No new complaints. Feels like her left leg especially has been getting stronger since starting PT. Still concerned about her balance when climbing stairs at stadium when watching son play football.  She was compliant with home exercise program.  Response to previous treatment: No adverse response  Functional change: None reported yet    Pain: 0/10  Location: right anterior lower leg     OBJECTIVE     Objective Measures updated at progress report unless specified.       Treatment     Darwin received the treatments listed below:      therapeutic exercises to develop strength, endurance, ROM and flexibility for 20 minutes including:    - treadmill walking 1.4 speed, incline 5 x 8 min   - Reciprocal step ups to 1st stair with 7# in each hand 2x10B  - heel raises off green oval 2 x 15    NOT TODAY  - Cybex leg press 2x10 bilateral lower extremity (6 plates) x10 unilateral each leg (4 plates)  - 2 over ground laps with 5# weight in right hand  - Monster walks yellow theraband around ankles x 6 lengths x 8 feet each     neuromuscular re-education activities to improve: Balance, Coordination, Kinesthetic, Sense and Proprioception for  "23 minutes. The following activities were included:  -- Tandem walking on airex beam (2) x 4 lengths x 10 feet each  -- side stepping on airex beam with hurdles x 6 lengths  -- marching with opposite hand to knee taps 45' x 2  -- walking forward/retro while chest passing volleyball 45' x 2     NOT TODAY  -- Single leg stance with red med ball chest press 2 x 10 bilaterally  -- Narrow base of support / on airex / eyes closed 3x30"  -- tandem stance with  red med ball shoulder press x10 B  -- Reciprocal step ups from airex to 6" step x10B  -- Toe taps to cones (6) + lateral side stepping x 4 lengths x 10 feet each on Airex balance beam  -- Zain (5) step overs (forward/reciprocal) x 4 lengths x 10 feet each on Airex balance beam    therapeutic activities to improve functional performance for 00 minutes, including:    gait training to improve functional mobility and safety for 00 minutes, including:    Patient Education and Home Exercises     Home Exercises Provided and Patient Education Provided     Education provided:   - Continue HEP    Written Home Exercises Provided: Patient instructed to cont prior HEP. Exercises were reviewed and Darwin was able to demonstrate them prior to the end of the session.  Darwin demonstrated good  understanding of the education provided. See EMR under Patient Instructions for exercises provided during therapy sessions    ASSESSMENT     Patient tolerated session very well with appropriate levels of fatigue achieved. Patient receptive to verbal cuing to minimize compensatory patterns with exercise. Brief reassessment performed and patient demonstrated improved bilateral lower extremity strength since initial evaluation. Plan to continued strengthening/endurance program with additional higher level balance interventions to address balance confidence.     Darwin Is progressing well towards her goals.   Pt prognosis is Good.     Pt will continue to benefit from skilled outpatient physical " therapy to address the deficits listed in the problem list box on initial evaluation, provide pt/family education and to maximize pt's level of independence in the home and community environment.     Pt's spiritual, cultural and educational needs considered and pt agreeable to plan of care and goals.     Anticipated barriers to physical therapy: Chronic neck and low back pain    Goals:     Short Term Goals: 3 weeks   1. Patient will be compliant with HEP in order to maximize PT benefits MET  2. Patient will score >/= 10 repetitions on 30-Second Sit to  order to improve BLE endurance and muscular power for transfers (progressing, not met)  3. Patient will walk >/= 1300 feet on Six-Minute Walk Test indoors in order to improve endurance for home mobility (progressing, not met)     Long Term Goals: 6 weeks   4. Patient will score </= 39% on FOTO limitation survey in order to improve self-perception of functional mobility deficits (progressing, not met)  5. Patient will improve bilateral lower extremity MMT grades by >/=1/2 grade in order to improve strength for ADL completion MET  6. Patient will score >/= 25/30 on FGA in order to reduce risk for falls and improve postural control (progressing, not met)  7. Patient will score >/= 12 repetitions on 30-Second Sit to  order to improve BLE endurance and muscular power for transfers (progressing, not met)  8. Patient will walk >/= 1500 feet on Six-Minute Walk Test outdoors over multiple levels/surfaces in order to improve endurance for community mobility (progressing, not met)  9. Patient will begin some form of home/community fitness in order to sustain progress gained in PT (progressing, not met)      PLAN     Continue to progress as per plan of care; include additional higher level balance interventions to improve balance confidence    Shannan Rose, PT

## 2022-04-11 ENCOUNTER — CLINICAL SUPPORT (OUTPATIENT)
Dept: REHABILITATION | Facility: HOSPITAL | Age: 52
End: 2022-04-11
Payer: MEDICARE

## 2022-04-11 DIAGNOSIS — Z74.09 IMPAIRED FUNCTIONAL MOBILITY, BALANCE, GAIT, AND ENDURANCE: Primary | ICD-10-CM

## 2022-04-11 PROCEDURE — 97112 NEUROMUSCULAR REEDUCATION: CPT | Mod: PN

## 2022-04-11 PROCEDURE — 97116 GAIT TRAINING THERAPY: CPT | Mod: PN

## 2022-04-11 PROCEDURE — 97110 THERAPEUTIC EXERCISES: CPT | Mod: PN

## 2022-04-11 NOTE — PROGRESS NOTES
[facility]   Physical Therapy Treatment Note     Name: Darwin Beltrán  Clinic Number: [id number]    Therapy Diagnosis:   Encounter Diagnosis   Name Primary?    Impaired functional mobility, balance, gait, and endurance Yes     Physician: Tadeo Pulliam MD    Visit Date: 4/11/2022    Physician Orders: PT Eval and Treat   Medical Diagnosis from Referral: R53.1 (ICD-10-CM) - Left-sided weakness  Evaluation Date: 3/14/2022  Authorization Period Expiration: 03/28/2022  Plan of Care Expiration: 4/29/2022  Visit # / Visits authorized: 8/ 6  FOTO: 7/10 (completed 3/30/22)    PTA Visit #: 0/5     Time In: 0925 AM   Time Out: 1013  AM  Total Billable Time: 53 minutes (1TE, 1NMR, 2 Gait)     Precautions: Standard and [place], Hypertension    SUBJECTIVE     Patient reports: No new complaints.She stated that she has a football game to attend for her son this week and will get to practice walking up stadium steps. She was compliant with home exercise program.  Response to previous treatment: No adverse response  Functional change: able to ambulate up a flight of stairs with HR with reciprocal pattern and 0 LOB    Pain: 0/10  Location: right anterior lower leg     OBJECTIVE     Objective Measures updated at progress report unless specified.       Treatment     Darwin received the treatments listed below:      therapeutic exercises to develop strength, endurance, ROM and flexibility for 10 minutes including:    - heel raises off green oval 2 x 15  - Cybex leg press 2x10 bilateral lower extremity (6 plates) 2x10 unilateral each leg (4 plates)  - Monster walks yellow theraband around ankles x 6 lengths x 8 feet each     neuromuscular re-education activities to improve: Balance, Coordination, Kinesthetic, Sense and Proprioception for 18 minutes. The following activities were included:  -- Tandem walking on airex beam (2) x 4 lengths x 10 feet each  -- foot slides  L LE sliding x 10 forward and  backward with R LE stance  -- foot slides R LE sliding x 10 forward and backward with R LE stance  -- high tandem stance on airex(floor) + 2 blue steps stacked performing red ball overhead press 2 x 15 B    gait training to improve functional mobility and safety for 25 minutes, including:    - treadmill walking 1.4-1.8 speed, incline 5 x 8 min  -- stair trainin full  flights total without UE support with reciprocal pattern  -- gait training outdoor [facility] 2 x 400 feet for 4 minutes each way minutes over multiple terrain of steps, curbs, declines, inclines etc.     Patient required 2 seated water breaks during session  Patient Education and Home Exercises     Home Exercises Provided and Patient Education Provided     Education provided:   - Continue HEP    Written Home Exercises Provided: Patient instructed to cont prior HEP. Exercises were reviewed and Darwin was able to demonstrate them prior to the end of the session.  Darwin demonstrated good  understanding of the education provided. See EMR under Patient Instructions for exercises provided during therapy sessions    ASSESSMENT     Patient was able to perform gait training on stairs without UE support.  She had normal levels of fatigue during session today with the increased tx time.  Plan to continued strengthening/endurance program with additional higher level balance interventions to address balance confidence.     Darwin Is progressing well towards her goals.   Pt prognosis is Good.     Pt will continue to benefit from skilled outpatient physical therapy to address the deficits listed in the problem list box on initial evaluation, provide pt/family education and to maximize pt's level of independence in the home and community environment.     Pt's spiritual, cultural and educational needs considered and pt agreeable to plan of care and goals.     Anticipated barriers to physical therapy: Chronic neck and low back pain    Goals:     Short Term Goals: 3  weeks   1. Patient will be compliant with HEP in order to maximize PT benefits MET  2. Patient will score >/= 10 repetitions on 30-Second Sit to  order to improve BLE endurance and muscular power for transfers (progressing, not met)  3. Patient will walk >/= 1300 feet on Six-Minute Walk Test indoors in order to improve endurance for home mobility (progressing, not met)     Long Term Goals: 6 weeks   4. Patient will score </= 39% on FOTO limitation survey in order to improve self-perception of functional mobility deficits (progressing, not met)  5. Patient will improve bilateral lower extremity MMT grades by >/=1/2 grade in order to improve strength for ADL completion MET  6. Patient will score >/= 25/30 on FGA in order to reduce risk for falls and improve postural control (progressing, not met)  7. Patient will score >/= 12 repetitions on 30-Second Sit to  order to improve BLE endurance and muscular power for transfers (progressing, not met)  8. Patient will walk >/= 1500 feet on Six-Minute Walk Test outdoors over multiple levels/surfaces in order to improve endurance for community mobility (progressing, not met)  9. Patient will begin some form of home/community fitness in order to sustain progress gained in PT (progressing, not met)      PLAN   - Reciprocal step ups to 1st stair with 7# in each hand 2x10B  -- Toe taps to cones (6) + lateral side stepping x 4 lengths x 10 feet each on Airex balance beam  -- Zain (5) step overs (forward/reciprocal) x 4 lengths x 10 feet each on Airex balance beam  Continue to progress as per plan of care; include additional higher level balance interventions to improve balance confidence  D/c at the end of the month on 4/27/2022 once more visits authorized. Recommend 4 additional visit (total visits recommended 12)    Italia Jack, PT

## 2022-04-13 ENCOUNTER — CLINICAL SUPPORT (OUTPATIENT)
Dept: REHABILITATION | Facility: HOSPITAL | Age: 52
End: 2022-04-13
Payer: MEDICARE

## 2022-04-13 DIAGNOSIS — Z74.09 IMPAIRED FUNCTIONAL MOBILITY, BALANCE, GAIT, AND ENDURANCE: Primary | ICD-10-CM

## 2022-04-13 PROCEDURE — 97116 GAIT TRAINING THERAPY: CPT | Mod: PN

## 2022-04-13 PROCEDURE — 97112 NEUROMUSCULAR REEDUCATION: CPT | Mod: PN

## 2022-04-13 PROCEDURE — 97110 THERAPEUTIC EXERCISES: CPT | Mod: PN

## 2022-04-13 NOTE — PROGRESS NOTES
" [facility]   Physical Therapy Treatment Note     Name: Darwin Beltrán  Clinic Number: [id number]    Therapy Diagnosis:   Encounter Diagnosis   Name Primary?    Impaired functional mobility, balance, gait, and endurance Yes     Physician: Tadeo Pulliam MD    Visit Date: 4/13/2022    Physician Orders: PT Eval and Treat   Medical Diagnosis from Referral: R53.1 (ICD-10-CM) - Left-sided weakness  Evaluation Date: 3/14/2022  Authorization Period Expiration: 03/28/2022  Plan of Care Expiration: 4/29/2022  Visit # / Visits authorized: 9/ 6  FOTO: 7/10 (completed 3/30/22)    PTA Visit #: 0/5     Time In: 0720 AM   Time Out: 0803  AM  Total Billable Time: 43 minutes (1TE, 1NMR, 1 GT)     Precautions: Standard and [place], Hypertension    SUBJECTIVE     Patient reports: No new complaints. Patient wasn't able to practice climbing bleacher steps yesterday because her son was sick and didn't play in football game.   She was compliant with home exercise program.  Response to previous treatment: No adverse response  Functional change: able to ambulate up a flight of stairs without HR with reciprocal pattern and 0 LOB    Pain: 0/10  Location: right anterior lower leg     OBJECTIVE     Objective Measures updated at progress report unless specified.       Treatment     Darwin received the treatments listed below:      therapeutic exercises to develop strength, endurance, ROM and flexibility for 12 minutes including:    - Nustep hills program x 6 min level 4  - Cybex leg press 2x10 bilateral lower extremity (6 plates) 2x10 unilateral each leg (4 plates)  - Monster walks yellow theraband around ankles x 6 lengths x 8 feet each     neuromuscular re-education activities to improve: Balance, Coordination, Kinesthetic, Sense and Proprioception for 11 minutes. The following activities were included:    - forward hurdles with lateral cone taps in single leg stance 10" x 6  - lateral hurdles with " "forward cone taps in single leg stance 10" x 6  -- Tandem walking on airex beam (2) x 4 lengths x 10 feet each  -- foot slides  L LE sliding x 10 forward and backward with R LE stance  -- foot slides R LE sliding x 10 forward and backward with R LE stance  -- high tandem stance on airex(floor) + 2 blue steps stacked performing red ball overhead press 2 x 15 B    gait training to improve functional mobility and safety for 20 minutes, including:    -- stair trainin full flights total without UE support with reciprocal pattern  -- gait training outdoor [facility] AD 2 x 400 feet for 4 minutes each way minutes over multiple terrain of steps, curbs, declines, inclines etc.     Patient required 1 seated water break during session  Patient Education and Home Exercises     Home Exercises Provided and Patient Education Provided     Education provided:   - Continue HEP    Written Home Exercises Provided: Patient instructed to cont prior HEP. Exercises were reviewed and Darwin was able to demonstrate them prior to the end of the session.  Darwin demonstrated good  understanding of the education provided. See EMR under Patient Instructions for exercises provided during therapy sessions    ASSESSMENT     Patient was able to perform gait training on stairs without UE support.  She had normal levels of fatigue during session today with the increased tx time.  Plan to continued strengthening/endurance program with additional higher level balance interventions to address balance confidence.     Darwin Is progressing well towards her goals.   Pt prognosis is Good.     Pt will continue to benefit from skilled outpatient physical therapy to address the deficits listed in the problem list box on initial evaluation, provide pt/family education and to maximize pt's level of independence in the home and community environment.     Pt's spiritual, cultural and educational needs considered and pt agreeable to plan of care and goals.   "   Anticipated barriers to physical therapy: Chronic neck and low back pain    Goals:     Short Term Goals: 3 weeks   1. Patient will be compliant with HEP in order to maximize PT benefits MET  2. Patient will score >/= 10 repetitions on 30-Second Sit to  order to improve BLE endurance and muscular power for transfers (progressing, not met)  3. Patient will walk >/= 1300 feet on Six-Minute Walk Test indoors in order to improve endurance for home mobility (progressing, not met)     Long Term Goals: 6 weeks   4. Patient will score </= 39% on FOTO limitation survey in order to improve self-perception of functional mobility deficits (progressing, not met)  5. Patient will improve bilateral lower extremity MMT grades by >/=1/2 grade in order to improve strength for ADL completion MET  6. Patient will score >/= 25/30 on FGA in order to reduce risk for falls and improve postural control (progressing, not met)  7. Patient will score >/= 12 repetitions on 30-Second Sit to  order to improve BLE endurance and muscular power for transfers (progressing, not met)  8. Patient will walk >/= 1500 feet on Six-Minute Walk Test outdoors over multiple levels/surfaces in order to improve endurance for community mobility (progressing, not met)  9. Patient will begin some form of home/community fitness in order to sustain progress gained in PT (progressing, not met)      PLAN   - Reciprocal step ups to 1st stair with 7# in each hand 2x10B  -- Toe taps to cones (6) + lateral side stepping x 4 lengths x 10 feet each on Airex balance beam  -- Zain (5) step overs (forward/reciprocal) x 4 lengths x 10 feet each on Airex balance beam  Continue to progress as per plan of care; include additional higher level balance interventions to improve balance confidence  D/c at the end of the month on 4/27/2022 once more visits authorized. Recommend 4 additional visit (total visits recommended 12)    Shannan Rose, PT

## 2022-04-18 ENCOUNTER — CLINICAL SUPPORT (OUTPATIENT)
Dept: REHABILITATION | Facility: HOSPITAL | Age: 52
End: 2022-04-18
Payer: MEDICARE

## 2022-04-18 DIAGNOSIS — Z74.09 IMPAIRED FUNCTIONAL MOBILITY, BALANCE, GAIT, AND ENDURANCE: Primary | ICD-10-CM

## 2022-04-18 PROCEDURE — 97112 NEUROMUSCULAR REEDUCATION: CPT | Mod: PN,CQ

## 2022-04-18 PROCEDURE — 97116 GAIT TRAINING THERAPY: CPT | Mod: PN,CQ

## 2022-04-18 PROCEDURE — 97110 THERAPEUTIC EXERCISES: CPT | Mod: PN,CQ

## 2022-04-18 NOTE — PROGRESS NOTES
" [facility]   Physical Therapy Treatment Note     Name: Darwin Beltrán  Clinic Number: [id number]    Therapy Diagnosis:   Encounter Diagnosis   Name Primary?    Impaired functional mobility, balance, gait, and endurance Yes     Physician: Tadeo Pulliam MD    Visit Date: 4/18/2022    Physician Orders: PT Eval and Treat   Medical Diagnosis from Referral: R53.1 (ICD-10-CM) - Left-sided weakness  Evaluation Date: 3/14/2022  Authorization Period Expiration: 03/28/2022  Plan of Care Expiration: 4/29/2022  Visit # / Visits authorized: 10/ 6  FOTO: 8/10 (completed 3/30/22)    PTA Visit #: 1/5     Time In: 09:30 AM   Time Out: 10:45 AM  Total Billable Time: 45 minutes (1TE, 1NMR, 1 GT)     Precautions: Standard and [place], Hypertension    SUBJECTIVE     Patient reports: No complaints of pain and nothing new to update.     She was compliant with home exercise program.  Response to previous treatment: No adverse response  Functional change: able to ambulate up a flight of stairs without HR with reciprocal pattern and 0 LOB    Pain: 0/10  Location: right anterior lower leg     OBJECTIVE     Objective Measures updated at progress report unless specified.       Treatment     Darwin received the treatments listed below:      therapeutic exercises to develop strength, endurance, ROM and flexibility for 15 minutes including:    -- Nustep hills program x 8 min level 4  -- Cybex leg press 2x10 bilateral lower extremity (6 plates) 2x10 unilateral each leg (4 plates)  -- Monster walks yellow theraband around ankles x 6 lengths x 8 feet each   -- Cybex Hamstring curls 2x10 DL (4 plates)    neuromuscular re-education activities to improve: Balance, Coordination, Kinesthetic, Sense and Proprioception for 20 minutes. The following activities were included:    -- forward hurdles with lateral cone taps in single leg stance 10" x 6  -- lateral hurdles with forward cone taps in single leg stance 10" x " 6  -- Tandem walking on airex beam (2) x 4 lengths x 10 feet each  -- Lateral walking on airex beam (2) with hurdles x 4 lengths x 10 feet each  -- foot slides L LE sliding x 10 forward and backward with R LE stance  -- foot slides R LE sliding x 10 forward and backward with L LE stance  -- C slides L LE sliding x 10 forward and backward with R LE stance  -- C slides R LE sliding x 10 forward and backward with L LE stance  -- high tandem stance on airex(floor) + 2 blue steps stacked performing red ball overhead press 2 x 15 B                                                                                                                      trunk twists 2 x 15 B       gait training to improve functional mobility and safety for 10 minutes, including:    -- stair trainin full flights total without UE support with reciprocal pattern  -- gait training outdoor [facility] AD 2 x 400 feet for 4 minutes each way minutes over multiple terrain of steps, curbs, declines, inclines etc. NP - time    Patient required 1 seated water break during session  Patient Education and Home Exercises     Home Exercises Provided and Patient Education Provided     Education provided:   - Continue HEP    Written Home Exercises Provided: Patient instructed to cont prior HEP. Exercises were reviewed and Darwin was able to demonstrate them prior to the end of the session.  Darwin demonstrated good  understanding of the education provided. See EMR under Patient Instructions for exercises provided during therapy sessions    ASSESSMENT     Darwin arrived to session without any complaints of pain and was agreeable to treatment.  Session consisted of BLE strengthening, higher level balance training, and stair training.  Pt did not require any UE support for balance and stairs and only experienced a minor loss of balance which she recovered from on her own via stepping strategy.  She only requested a single seated rest break for fatigue recovery.   Progressions noted in bold which she completed correctly and without any adverse response.  Darwin was appropriately fatigued upon completion of session and denied any increase in pain.       Darwin Is progressing well towards her goals.   Pt prognosis is Good.     Pt will continue to benefit from skilled outpatient physical therapy to address the deficits listed in the problem list box on initial evaluation, provide pt/family education and to maximize pt's level of independence in the home and community environment.     Pt's spiritual, cultural and educational needs considered and pt agreeable to plan of care and goals.     Anticipated barriers to physical therapy: Chronic neck and low back pain    Goals:     Short Term Goals: 3 weeks   1. Patient will be compliant with HEP in order to maximize PT benefits MET  2. Patient will score >/= 10 repetitions on 30-Second Sit to  order to improve BLE endurance and muscular power for transfers (progressing, not met)  3. Patient will walk >/= 1300 feet on Six-Minute Walk Test indoors in order to improve endurance for home mobility (progressing, not met)     Long Term Goals: 6 weeks   4. Patient will score </= 39% on FOTO limitation survey in order to improve self-perception of functional mobility deficits (progressing, not met)  5. Patient will improve bilateral lower extremity MMT grades by >/=1/2 grade in order to improve strength for ADL completion MET  6. Patient will score >/= 25/30 on FGA in order to reduce risk for falls and improve postural control (progressing, not met)  7. Patient will score >/= 12 repetitions on 30-Second Sit to  order to improve BLE endurance and muscular power for transfers (progressing, not met)  8. Patient will walk >/= 1500 feet on Six-Minute Walk Test outdoors over multiple levels/surfaces in order to improve endurance for community mobility (progressing, not met)  9. Patient will begin some form of home/community fitness in  order to sustain progress gained in PT (progressing, not met)      PLAN   - Reciprocal step ups to 1st stair with 7# in each hand 2x10B  -- Toe taps to cones (6) + lateral side stepping x 4 lengths x 10 feet each on Airex balance beam    Continue to progress as per plan of care; include additional higher level balance interventions to improve balance confidence  D/c at the end of the month on 4/27/2022 once more visits authorized. Recommend 4 additional visit (total visits recommended 12)    Toya Mclaughlin, PTA

## 2022-04-20 ENCOUNTER — CLINICAL SUPPORT (OUTPATIENT)
Dept: REHABILITATION | Facility: HOSPITAL | Age: 52
End: 2022-04-20
Payer: MEDICARE

## 2022-04-20 DIAGNOSIS — Z74.09 IMPAIRED FUNCTIONAL MOBILITY, BALANCE, GAIT, AND ENDURANCE: Primary | ICD-10-CM

## 2022-04-20 PROCEDURE — 97110 THERAPEUTIC EXERCISES: CPT | Mod: PN

## 2022-04-20 PROCEDURE — 97112 NEUROMUSCULAR REEDUCATION: CPT | Mod: PN

## 2022-04-20 NOTE — PROGRESS NOTES
" [facility]   Physical Therapy Treatment Note     Name: Darwin Beltrán  Clinic Number: [id number]    Therapy Diagnosis:   Encounter Diagnosis   Name Primary?    Impaired functional mobility, balance, gait, and endurance Yes     Physician: Tadeo Pulliam MD    Visit Date: 4/20/2022    Physician Orders: PT Eval and Treat   Medical Diagnosis from Referral: R53.1 (ICD-10-CM) - Left-sided weakness  Evaluation Date: 3/14/2022  Authorization Period Expiration: 03/28/2022  Plan of Care Expiration: 4/29/2022  Visit # / Visits authorized: 11/ 6  FOTO: 8/10 (completed 3/30/22) NEXT AT D/C    PTA Visit #: 1/5     Time In: 07:18 AM   Time Out: 8:00 AM  Total Billable Time: 42 minutes (1TE, 2 NMR)     Precautions: Standard and [place], Hypertension    SUBJECTIVE     Patient reports: No complaints of pain and nothing new to update.     She was compliant with home exercise program.  Response to previous treatment: No adverse response  Functional change: able to ambulate up a flight of stairs without HR with reciprocal pattern and 0 LOB    Pain: 0/10  Location: right anterior lower leg     OBJECTIVE     Objective Measures updated at progress report unless specified.       Treatment     Darwin received the treatments listed below:      therapeutic exercises to develop strength, endurance, ROM and flexibility for 15 minutes including:    -- Nustep hills program x 8 min level 4  -- goblet squat 6# 2 x 10 reps  -- heel raises off 1st step 2 x 10 reps  -- Monster walks yellow theraband around ankles x 6 lengths x 8 feet each (NP)      neuromuscular re-education activities to improve: Balance, Coordination, Kinesthetic, Sense and Proprioception for 30 minutes. The following activities were included:    -- forward hurdles with lateral cone taps in single leg stance 10" x 6  -- lateral hurdles with forward cone taps in single leg stance 10" x 6  -- Tandem walking on airex beam (2) x 4 lengths x 10 " feet each  -- Lateral walking on airex beam (2) with hurdles x 4 lengths x 10 feet each  -- foot slides L LE sliding x 10 forward and backward with R LE stance  -- foot slides R LE sliding x 10 forward and backward with L LE stance  -- C slides L LE sliding x 10 forward and backward with R LE stance  -- C slides R LE sliding x 10 forward and backward with L LE stance    Not performed:  -- high tandem stance on airex(floor) + 2 blue steps stacked performing red ball overhead press 2 x 15 B                                                                           trunk twists 2 x 15 B       gait training to improve functional mobility and safety for 0 minutes, including:    -- stair trainin full flights total without UE support with reciprocal pattern  -- gait training outdoor [facility] AD 2 x 400 feet for 4 minutes each way minutes over multiple terrain of steps, curbs, declines, inclines etc. NP - time    Patient required 1 seated water break during session  Patient Education and Home Exercises     Home Exercises Provided and Patient Education Provided     Education provided:   - Continue HEP    Written Home Exercises Provided: Patient instructed to cont prior HEP. Exercises were reviewed and Darwin was able to demonstrate them prior to the end of the session.  Darwin demonstrated good  understanding of the education provided. See EMR under Patient Instructions for exercises provided during therapy sessions    ASSESSMENT     Darwin arrived to session without any complaints of pain and was agreeable to treatment.  Session consisted of BLE strengthening, higher level balance training, and stair training.  Pt did not require any UE support for balance and stairs and only experienced a minor loss of balance which she recovered from on her own via stepping strategy.  She only requested a single seated rest break for fatigue recovery.  Darwin was appropriately fatigued upon completion of session and denied any increase in  pain. Pt will be ready for discharge at the end of next week.      Darwin Is progressing well towards her goals.   Pt prognosis is Good.     Pt will continue to benefit from skilled outpatient physical therapy to address the deficits listed in the problem list box on initial evaluation, provide pt/family education and to maximize pt's level of independence in the home and community environment.     Pt's spiritual, cultural and educational needs considered and pt agreeable to plan of care and goals.     Anticipated barriers to physical therapy: Chronic neck and low back pain    Goals:     Short Term Goals: 3 weeks   1. Patient will be compliant with HEP in order to maximize PT benefits MET  2. Patient will score >/= 10 repetitions on 30-Second Sit to  order to improve BLE endurance and muscular power for transfers (progressing, not met)  3. Patient will walk >/= 1300 feet on Six-Minute Walk Test indoors in order to improve endurance for home mobility (progressing, not met)     Long Term Goals: 6 weeks   4. Patient will score </= 39% on FOTO limitation survey in order to improve self-perception of functional mobility deficits (progressing, not met)  5. Patient will improve bilateral lower extremity MMT grades by >/=1/2 grade in order to improve strength for ADL completion MET  6. Patient will score >/= 25/30 on FGA in order to reduce risk for falls and improve postural control (progressing, not met)  7. Patient will score >/= 12 repetitions on 30-Second Sit to  order to improve BLE endurance and muscular power for transfers (progressing, not met)  8. Patient will walk >/= 1500 feet on Six-Minute Walk Test outdoors over multiple levels/surfaces in order to improve endurance for community mobility (progressing, not met)  9. Patient will begin some form of home/community fitness in order to sustain progress gained in PT (progressing, not met)      PLAN   - Reciprocal step ups to 1st stair with 7# in each  hand 2x10B  -- Toe taps to cones (6) + lateral side stepping x 4 lengths x 10 feet each on Airex balance beam    Continue to progress as per plan of care; include additional higher level balance interventions to improve balance confidence  D/c at the end of the month on 4/27/2022 once more visits authorized. Recommend 4 additional visit (total visits recommended 12)    Shannan Rose, PT

## 2022-04-25 ENCOUNTER — CLINICAL SUPPORT (OUTPATIENT)
Dept: REHABILITATION | Facility: HOSPITAL | Age: 52
End: 2022-04-25
Payer: MEDICARE

## 2022-04-25 DIAGNOSIS — Z74.09 IMPAIRED FUNCTIONAL MOBILITY, BALANCE, GAIT, AND ENDURANCE: Primary | ICD-10-CM

## 2022-04-25 PROCEDURE — 97112 NEUROMUSCULAR REEDUCATION: CPT | Mod: PN

## 2022-04-25 PROCEDURE — 97110 THERAPEUTIC EXERCISES: CPT | Mod: PN

## 2022-04-25 NOTE — PROGRESS NOTES
[facility]   Physical Therapy Treatment Note     Name: Darwin Beltrán  Clinic Number: [id number]    Therapy Diagnosis:   Encounter Diagnosis   Name Primary?    Impaired functional mobility, balance, gait, and endurance Yes     Physician: Tadeo Pulliam MD    Visit Date: 4/25/2022    Physician Orders: PT Eval and Treat   Medical Diagnosis from Referral: R53.1 (ICD-10-CM) - Left-sided weakness  Evaluation Date: 3/14/2022  Authorization Period Expiration: 03/28/2022  Plan of Care Expiration: 4/29/2022  Visit # / Visits authorized: 12/ 6  FOTO: NEXT AT D/C    PTA Visit #: 0/5     Time In: 7:18 AM   Time Out: 8:00 AM  Total Billable Time: 42 minutes (2TE, 1 NMR)     Precautions: Standard and [place], Hypertension    SUBJECTIVE     Patient reports: Knows she will be discharged next session   She was compliant with home exercise program.  Response to previous treatment: No adverse response  Functional change: able to ambulate up a flight of stairs without HR with reciprocal pattern and 0 LOB    Pain: 0/10  Location: right anterior lower leg     OBJECTIVE     Objective Measures updated at progress report unless specified.       Treatment     Darwin received the treatments listed below:      therapeutic exercises to develop strength, endurance, ROM and flexibility for 28 minutes including:    -- Treadmill walking x 5 minutes; speed setting 2.0  -- goblet squat 8# 2x10  -- heel raises off 1st step 2x15  -- Monster walks yellow theraband around ankles x 6 lengths x 10 feet each  -- [employer] hills program x 5 min level 5    neuromuscular re-education activities to improve: Balance, Coordination, Kinesthetic, Sense and Proprioception for 14 minutes. The following activities were included:    -- Tandem walking on airex beam (2) x 6 lengths x 10 feet each  -- Lateral walking on airex beam (2) with hurdles x 6 lengths x 10 feet each  -- Dot mat diagonals with single leg stance on green foam  "disc 3x20" each leg    NOT TODAY  -- forward hurdles with lateral cone taps in single leg stance 10" x 6  -- lateral hurdles with forward cone taps in single leg stance 10" x 6  -- foot slides L LE sliding x 10 forward and backward with R LE stance  -- foot slides R LE sliding x 10 forward and backward with L LE stance  -- C slides L LE sliding x 10 forward and backward with R LE stance  -- C slides R LE sliding x 10 forward and backward with L LE stance    gait training to improve functional mobility and safety for 0 minutes, including:    NOT TODAY  -- stair trainin full flights total without UE support with reciprocal pattern  -- gait training outdoor [facility] 2 x 400 feet for 4 minutes each way minutes over multiple terrain of steps, curbs, declines, inclines etc    Patient Education and Home Exercises     Home Exercises Provided and Patient Education Provided     Education provided:   - Continue HEP    Written Home Exercises Provided: Patient instructed to cont prior HEP. Exercises were reviewed and Darwin was able to demonstrate them prior to the end of the session.  Darwin demonstrated good  understanding of the education provided. See EMR under Patient Instructions for exercises provided during therapy sessions    ASSESSMENT     Darwin tolerated session well with appropriate levels of fatigue achieved. Even with higher level balance tasks, she demonstrates good postural strategies and appears at low risk for falls. Due to capacity for continued independence with regular exercise outside of PT and excellent tolerance of higher level balance tasks, she will likely be discharged next visit. She would benefit from 1 additional visit to reassess goals and update HEP/provide additional patient education if needed.    Darwin Is progressing well towards her goals.   Pt prognosis is Good.     Pt will continue to benefit from skilled outpatient physical therapy to address the deficits listed in the problem list box " on initial evaluation, provide pt/family education and to maximize pt's level of independence in the home and community environment.     Pt's spiritual, cultural and educational needs considered and pt agreeable to plan of care and goals.     Anticipated barriers to physical therapy: Chronic neck and low back pain    Goals:     Short Term Goals: 3 weeks   1. Patient will be compliant with HEP in order to maximize PT benefits MET  2. Patient will score >/= 10 repetitions on 30-Second Sit to  order to improve BLE endurance and muscular power for transfers (progressing, not met)  3. Patient will walk >/= 1300 feet on Six-Minute Walk Test indoors in order to improve endurance for home mobility (progressing, not met)     Long Term Goals: 6 weeks   4. Patient will score </= 39% on FOTO limitation survey in order to improve self-perception of functional mobility deficits (progressing, not met)  5. Patient will improve bilateral lower extremity MMT grades by >/=1/2 grade in order to improve strength for ADL completion MET  6. Patient will score >/= 25/30 on FGA in order to reduce risk for falls and improve postural control (progressing, not met)  7. Patient will score >/= 12 repetitions on 30-Second Sit to  order to improve BLE endurance and muscular power for transfers (progressing, not met)  8. Patient will walk >/= 1500 feet on Six-Minute Walk Test outdoors over multiple levels/surfaces in order to improve endurance for community mobility (progressing, not met)  9. Patient will begin some form of home/community fitness in order to sustain progress gained in PT (progressing, not met)      PLAN      D/c next on 4/27/2022; complete FOTO/reassess as time permits and update HEP if needed    CHRISTIANO BLANCHARD, PT

## 2022-04-27 ENCOUNTER — CLINICAL SUPPORT (OUTPATIENT)
Dept: REHABILITATION | Facility: HOSPITAL | Age: 52
End: 2022-04-27
Payer: MEDICARE

## 2022-04-27 DIAGNOSIS — Z74.09 IMPAIRED FUNCTIONAL MOBILITY, BALANCE, GAIT, AND ENDURANCE: Primary | ICD-10-CM

## 2022-04-27 PROCEDURE — 97110 THERAPEUTIC EXERCISES: CPT | Mod: PN

## 2022-04-27 NOTE — PROGRESS NOTES
[facility]   Physical Therapy Treatment Note / Discharge Summary    Name: Darwin Beltrán  Clinic Number: [id number]    Therapy Diagnosis:   Encounter Diagnosis   Name Primary?    Impaired functional mobility, balance, gait, and endurance Yes     Physician: Tadeo Pulliam MD    Visit Date: 4/27/2022    Physician Orders: PT Eval and Treat   Medical Diagnosis from Referral: R53.1 (ICD-10-CM) - Left-sided weakness  Evaluation Date: 3/14/2022  Authorization Period Expiration: 03/28/2022  Plan of Care Expiration: 4/29/2022  Visit # / Visits authorized: 13/ 6  FOTO: 13th visit - discharge FOTO completed    PTA Visit #: 0/5     Time In: 7:18 AM   Time Out: 7:58 AM  Total Billable Time: 40 minutes (3TE)     Precautions: Standard and [place], Hypertension    SUBJECTIVE     Patient reports: Pt denies pain, going to [employer] games multiple times a week and feels comfortable walking over uneven terrains.   She was compliant with home exercise program.  Response to previous treatment: No adverse response  Functional change: able to ambulate up a flight of stairs without HR with reciprocal pattern and 0 LOB    Pain: 0/10  Location: right anterior lower leg     OBJECTIVE     Objective Measures updated at progress report unless specified.     Lower Extremity Strength    RLE LLE   Hip Flexion: 5/5 5/5   Hip Extension:  5/5 5/5   Hip Abduction: 5/5 55   Hip External Rotation: 5/5 5/5   Knee Extension: 5/5 5/5   Knee Flexion: 4+/5 4+/5   Ankle Dorsiflexion: 5/5 5/5     30-Second Sit to Stand: 9 repetitions without upper extremity push off, 12 with upper extremity push off      Six-Minute Walk Test: 1188 feet (RPE 4/10)     Functional Gait Assessment:     *note: Measure a distance of 20 feet (~6 meters) for this assessment    1. Gait on level surface =  3   (3) Normal: less than 5.5 sec, no A.D., no imbalance, normal gait pattern, deviates <6in   (2) Mild impairment: 7-5.6 sec, uses A.D., mild  gait deviations, or deviates 6-10 in   (1) Moderate impairment: > 7 sec, slow speed, imbalance, deviates 10-15 in.   (0) Severe impairment: needs assist, deviates >15 in, reach/touch wall  2. Change in Gait Speed = 3   (3) Normal: smooth change w/o loss of balance or gait deviation, deviates < 6 in, significant difference between speeds   (2) Mild impairment: changes speed, but demonstrates mild gait deviations, deviates 6-10 in, OR no deviations but unable to significantly speed, OR uses A.D.   (1) Moderate impairment: minor changes to speed, OR changes speed w/ significant deviations, deviates 10-15 in, OR  Changes speed , but loses balance & recovers   (0) Severe impairment: cannot change speed, deviates >15 in, or loses balance & needs assist  3. Gait with horizontal head turns  = 2   (3) Normal: no change in gait, deviates <6 in   (2) Mild impairment: slight change in speed, deviates 6-10 in, OR uses A.D.   (1) Moderate impairment: moderate change in speed, deviates 10-15 in   (0) Severe impairment: severe disruption of gait, deviates >15in  4. Gait with vertical head turns = 3   (3) Normal: no change in gait, deviates <6 in   (2) Mild impairment: slight change in speed, deviates 6-10 in OR uses A.D.   (1) Moderate impairment: moderate change in speed, deviates 10-15 in   (0) Severe impairment: severe disruption of gait, deviates >15 in  5. Gait with pivot turns = 3   (3) Normal: performs safely in 3 sec, no LOB   (2) Mild impairment: performs in >3 sec & no LOB, OR turns safely & requires several steps to regain LOB   (1) Moderate impairment: turns slow, OR requires several small steps for balance following turn & stop   (0) Severe impairment: cannot turn safely, needs assist  6. Step over obstacle = 3   (3) Normal: steps over 2 stacked boxes w/o change in speed or LOB   (2) Mild impairment: able to step over 1 box w/o change in speed or LOB   (1) Moderate impairment: steps over 1 box but must slow down, may  require VC   (0) Severe impairment: cannot perform w/o assist  7. Gait with Narrow ADARSH = 3   (3) Normal: 10 steps no staggering   (2) Mild impairment: 7-9 steps   (1) Moderate impairment: 4-7 steps   (0) Severe impairment: < 4 steps or cannot perform w/o assist  8. Gait with eyes closed = 3   (3) Normal: < 7 sec, no A.D., no LOB, normal gait pattern, deviates <6 in   (2) Mild impairment: 7.1-9 sec, mild gait deviations, deviates 6-10 in   (1) Moderate impairment: > 9 sec, abnormal pattern, LOB, deviates 10-15 in   (0) Severe impairment: cannot perform w/o assist, LOB, deviates >15in  9. Ambulating Backwards = 3   (3) Normal: no A.D., no LOB, normal gait pattern, deviates <6in   (2) Mild impairment: uses A.D., slower speed, mild gait deviations, deviates 6-10 in   (1) Moderate impairment: slow speed, abnormal gait pattern, LOB, deviates 10-15 in   (0) Severe impairment: severe gait deviations or LOB, deviates >15in  10. Steps = 2   (3) Normal: alternating feet, no rail   (2) Mild Impairment: alternating feet, uses rail   (1) Moderate impairment: step-to, uses rail   (0) Severe impairment: cannot perform safely    Score 28/30     Cutoffs:   <22/30 fall risk in older adults  <18/30 fall risk in Parkinsons    MDC/MCID:  Stroke = 4.2 points (MDC)  Vestibular = 6 points (MDC)  Geriatric = 4 points (MCID)  Parkinson's = 4 points (MDC)    Treatment     [place] received the treatments listed below:      therapeutic exercises to develop strength, endurance, ROM and flexibility for 40 minutes including:    -- Nustep hills program x 5 min level 5  -- objective reassessment listed above  -- HEP review    neuromuscular re-education activities to improve: Balance, Coordination, Kinesthetic, Sense and Proprioception for 0 minutes. The following activities were included:            Patient Education and Home Exercises     Home Exercises Provided and Patient Education Provided     Education provided:   - Continue HEP    Written Home  Exercises Provided: Patient instructed to cont prior HEP. Exercises were reviewed and Darwin was able to demonstrate them prior to the end of the session.  Darwin demonstrated good  understanding of the education provided. See EMR under Patient Instructions for exercises provided during therapy sessions    ASSESSMENT     Darwin tolerated session well with appropriate levels of fatigue achieved. Even with higher level balance tasks, she demonstrates good postural strategies and appears at low risk for falls. Due to capacity for continued independence with regular exercise outside of PT and excellent tolerance of higher level balance tasks, she will likely be discharged next visit. She would benefit from 1 additional visit to reassess goals and update HEP/provide additional patient education if needed.    Darwin Is progressing well towards her goals.   Pt prognosis is Good.     Pt will continue to benefit from skilled outpatient physical therapy to address the deficits listed in the problem list box on initial evaluation, provide pt/family education and to maximize pt's level of independence in the home and community environment.     Pt's spiritual, cultural and educational needs considered and pt agreeable to plan of care and goals.     Anticipated barriers to physical therapy: Chronic neck and low back pain    Goals:     Short Term Goals: 3 weeks   1. Patient will be compliant with HEP in order to maximize PT benefits MET  2. Patient will score >/= 10 repetitions on 30-Second Sit to  order to improve BLE endurance and muscular power for transfers MET  3. Patient will walk >/= 1300 feet on Six-Minute Walk Test indoors in order to improve endurance for home mobility  not met     Long Term Goals: 6 weeks   4. Patient will score </= 39% on FOTO limitation survey in order to improve self-perception of functional mobility deficits  met  5. Patient will improve bilateral lower extremity MMT grades by >/=1/2 grade in  order to improve strength for ADL completion MET  6. Patient will score >/= 25/30 on FGA in order to reduce risk for falls and improve postural control met  7. Patient will score >/= 12 repetitions on 30-Second Sit to  order to improve BLE endurance and muscular power for transfers  met  8. Patient will walk >/= 1500 feet on Six-Minute Walk Test outdoors over multiple levels/surfaces in order to improve endurance for community mobility   not met  9. Patient will begin some form of home/community fitness in order to sustain progress gained in PT  met      PLAN      D/C  Physical therapy.    Shannan Rose, PT

## 2022-06-13 ENCOUNTER — PATIENT MESSAGE (OUTPATIENT)
Dept: SMOKING CESSATION | Facility: CLINIC | Age: 52
End: 2022-06-13
Payer: MEDICARE

## 2022-08-01 ENCOUNTER — CLINICAL SUPPORT (OUTPATIENT)
Dept: SMOKING CESSATION | Facility: CLINIC | Age: 52
End: 2022-08-01
Payer: COMMERCIAL

## 2022-08-01 DIAGNOSIS — F17.200 NICOTINE DEPENDENCE: Primary | ICD-10-CM

## 2022-08-01 PROCEDURE — 99404 PR PREVENT COUNSEL,INDIV,60 MIN: ICD-10-PCS | Mod: S$GLB,,,

## 2022-08-01 PROCEDURE — 99999 PR PBB SHADOW E&M-EST. PATIENT-LVL I: CPT | Mod: PBBFAC,,,

## 2022-08-01 PROCEDURE — 99404 PREV MED CNSL INDIV APPRX 60: CPT | Mod: S$GLB,,,

## 2022-08-01 PROCEDURE — 99999 PR PBB SHADOW E&M-EST. PATIENT-LVL I: ICD-10-PCS | Mod: PBBFAC,,,

## 2022-08-01 RX ORDER — DIPHENHYDRAMINE HCL 25 MG
4 CAPSULE ORAL
Qty: 100 EACH | Refills: 0 | Status: SHIPPED | OUTPATIENT
Start: 2022-08-01 | End: 2023-10-25

## 2022-08-01 RX ORDER — ASPIRIN 81 MG/1
81 TABLET ORAL DAILY
COMMUNITY
End: 2023-10-25

## 2022-08-01 RX ORDER — IBUPROFEN 200 MG
1 TABLET ORAL DAILY
Qty: 14 PATCH | Refills: 0 | Status: SHIPPED | OUTPATIENT
Start: 2022-08-01 | End: 2023-10-25

## 2022-08-01 NOTE — Clinical Note
Patient will be participating in biweekly tobacco cessation meetings and will begin the prescribed tobacco cessation medication regimen of 14 mg nicotine patch in conjunction with 4 mg nicotine gum. Patient denies any low moods or SI/HI , SOBIA-D score is 22, some degree of mental distress/possible depression at this time. FTND score was 4, moderate level of nicotine dependence. Patient currently smokes 10-12 cigarettes per day. Pt started on rate reduction and wait time of 15 min prior to smoking and is to cut back to 8 cpd. Exhaled carbon monoxide level was 19 ppm per [employer] (0-6 non-smoker). Will see pt back in office in 2 weeks.

## 2022-08-08 ENCOUNTER — CLINICAL SUPPORT (OUTPATIENT)
Dept: SMOKING CESSATION | Facility: CLINIC | Age: 52
End: 2022-08-08
Payer: COMMERCIAL

## 2022-08-08 ENCOUNTER — PES CALL (OUTPATIENT)
Dept: ADMINISTRATIVE | Facility: CLINIC | Age: 52
End: 2022-08-08
Payer: MEDICARE

## 2022-08-08 DIAGNOSIS — F17.200 NICOTINE DEPENDENCE: Primary | ICD-10-CM

## 2022-08-08 PROCEDURE — 99999 PR PBB SHADOW E&M-EST. PATIENT-LVL I: ICD-10-PCS | Mod: PBBFAC,,,

## 2022-08-08 PROCEDURE — 99999 PR PBB SHADOW E&M-EST. PATIENT-LVL I: CPT | Mod: PBBFAC,,,

## 2022-08-08 PROCEDURE — 99404 PR PREVENT COUNSEL,INDIV,60 MIN: ICD-10-PCS | Mod: S$GLB,,,

## 2022-08-08 PROCEDURE — 99404 PREV MED CNSL INDIV APPRX 60: CPT | Mod: S$GLB,,,

## 2022-08-08 NOTE — PROGRESS NOTES
Individual Follow-Up Form#2    8/8/2022    Quit Date: TBD    Clinical Status of Patient: Outpatient    Length of Service:      Continuing Medication: yes  Patches    Other Medications: 4 mg gum     Target Symptoms: Withdrawal and medication side effects. The following were  rated moderate (3) to severe (4) on TCRS:  · Moderate (3): desires or craves nicotine, nausea, rash; reviewed with patient  · Severe (4): none    Comments: Followed up with patient via telephone call on smoking cessation progress and medication follow up. Pt was smoking 10-12 cpd prior to starting program, she is now down to 6-8 cpd. Patient remains on prescribed tobacco cessation medication regimen of 14 mg patch in conjunction with 4 mg gum without any negative side effects at this time. Pt stated she has been having a hard time with patches sticking even when using medical tape. She also had some nausea when using gum, educated pt on how to use gum. Pt stated she doesn't feel like 14 mg nicotine patches are working, will increase dose to 21 mg nicotine patch. In the mean time advised pt to double patch with 14 mg patches. Pt is to cut back to 6 cpd, move location of where she is keeping cigarettes, and wait it out 15 min prior to smoking. The patient denies any abnormal behavioral or mental changes at this time. Pt is to follow up in clinic next week.     Diagnosis: F17.200    Next Visit: 1 week

## 2022-08-08 NOTE — Clinical Note
Followed up with patient via telephone call on smoking cessation progress and medication follow up. Pt was smoking 10-12 cpd prior to starting program, she is now down to 6-8 cpd. Patient remains on prescribed tobacco cessation medication regimen of 14 mg patch in conjunction with 4 mg gum without any negative side effects at this time. Pt stated she has been having a hard time with patches sticking even when using medical tape. She also had some nausea when using gum, educated pt on how to use gum. Pt stated she doesn't feel like 14 mg nicotine patches are working, will increase dose to 21 mg nicotine patch. In the mean time advised pt to double patch with 14 mg patches. Pt is to cut back to 6 cpd, move location of where she is keeping cigarettes, and wait it out 15 min prior to smoking. The patient denies any abnormal behavioral or mental changes at this time. Pt is to follow up in clinic next week.

## 2022-08-09 RX ORDER — IBUPROFEN 200 MG
1 TABLET ORAL DAILY
Qty: 14 PATCH | Refills: 0 | Status: SHIPPED | OUTPATIENT
Start: 2022-08-09 | End: 2023-10-25

## 2022-08-10 PROBLEM — Z87.891 PERSONAL HISTORY OF TOBACCO USE: Status: ACTIVE | Noted: 2020-02-26

## 2022-08-16 ENCOUNTER — CLINICAL SUPPORT (OUTPATIENT)
Dept: SMOKING CESSATION | Facility: CLINIC | Age: 52
End: 2022-08-16
Payer: COMMERCIAL

## 2022-08-16 DIAGNOSIS — F17.200 NICOTINE DEPENDENCE: Primary | ICD-10-CM

## 2022-08-16 PROCEDURE — 99404 PREV MED CNSL INDIV APPRX 60: CPT | Mod: S$GLB,,,

## 2022-08-16 PROCEDURE — 99404 PR PREVENT COUNSEL,INDIV,60 MIN: ICD-10-PCS | Mod: S$GLB,,,

## 2022-08-16 PROCEDURE — 99999 PR PBB SHADOW E&M-EST. PATIENT-LVL I: ICD-10-PCS | Mod: PBBFAC,,,

## 2022-08-16 PROCEDURE — 99999 PR PBB SHADOW E&M-EST. PATIENT-LVL I: CPT | Mod: PBBFAC,,,

## 2022-08-16 NOTE — PROGRESS NOTES
Individual Follow-Up Form    8/16/2022    Quit Date: TBD    Clinical Status of Patient: Outpatient    Length of Service: 60 minutes    Continuing Medication: yes  Nicotine gum    Other Medications: patches, but stopped using     Target Symptoms: Withdrawal and medication side effects. The following were  rated moderate (3) to severe (4) on TCRS:  · Moderate (3): rash, itching, and burning; reviewed with patient  · Severe (4): none    Comments:  Patient presents to clinic for smoking cessation follow up. Pt was smoking 10-12 cpd and is down to smoking 8 cigs/day. Patient was double patching with 14 mg patches in conjunction with 2 mg nicotine gum, but noticed that she has been having a rash at the site, advised her to only use the 21 mg patch and to place on her upper buttucks. Discussed rate reduction with her and encouraged her to wait 15 min prior to smoking, move location of where she keeps her cigarettes, and  to stay distracted and develop new habits oppose to smoking. Pt's goal for the week is to cut back to 6 cigs/day, and continue to wear the patch, use gum and stay busy cleaning out her garage. CO was 15 ppm. Will see pt back in 2 weeks for individual session.      Diagnosis: F17.200    Next Visit: 2 weeks

## 2022-08-18 ENCOUNTER — TELEPHONE (OUTPATIENT)
Dept: ADMINISTRATIVE | Facility: CLINIC | Age: 52
End: 2022-08-18
Payer: MEDICARE

## 2022-08-18 NOTE — TELEPHONE ENCOUNTER
Called pt; informed pt I was calling to let her know we needed to cancel her eawv appt on 8/24/22 because she already completed a visit with PHN on 1/5/22; pt verbalized understanding and appt canceled

## 2022-08-30 ENCOUNTER — CLINICAL SUPPORT (OUTPATIENT)
Dept: SMOKING CESSATION | Facility: CLINIC | Age: 52
End: 2022-08-30
Payer: COMMERCIAL

## 2022-08-30 DIAGNOSIS — F17.200 NICOTINE DEPENDENCE: Primary | ICD-10-CM

## 2022-08-30 PROCEDURE — 99999 PR PBB SHADOW E&M-EST. PATIENT-LVL I: CPT | Mod: PBBFAC,,,

## 2022-08-30 PROCEDURE — 99999 PR PBB SHADOW E&M-EST. PATIENT-LVL I: ICD-10-PCS | Mod: PBBFAC,,,

## 2022-08-30 PROCEDURE — 99404 PREV MED CNSL INDIV APPRX 60: CPT | Mod: S$GLB,,,

## 2022-08-30 PROCEDURE — 99404 PR PREVENT COUNSEL,INDIV,60 MIN: ICD-10-PCS | Mod: S$GLB,,,

## 2022-08-30 RX ORDER — VARENICLINE TARTRATE 1 MG/1
1 TABLET, FILM COATED ORAL 2 TIMES DAILY
Qty: 60 TABLET | Refills: 0 | Status: SHIPPED | OUTPATIENT
Start: 2022-08-30 | End: 2022-10-25 | Stop reason: SDUPTHER

## 2022-08-30 NOTE — PROGRESS NOTES
Individual Follow-Up Form #4    8/30/2022    Quit Date: TBD    Clinical Status of Patient: Outpatient    Length of Service: 60 minutes    Continuing Medication: no    Other Medications: will add Chantix     Target Symptoms: Withdrawal and medication side effects. The following were  rated moderate (3) to severe (4) on TCRS:  Moderate (3): desires or craves nicotine; reviewed with patient  Severe (4): none    Comments: Patient presents to smoking cessation clinic for follow up. Pt is back up to smoking  15 cpd, she tried to wear patches on her buttocks, but still had itching, burning, and rash so discontinued them. Pt not really using nicotine gum, will add 1 mg Chantix BID to her smoking cessation medication regimen. Has used Chantix before and had no negative side effects. Discussed and reviewed how to take Chantix and possible side effects. Discussed rate reduction with her and encouraged her to wait 15 min prior to smoking, move location of where she keeps her cigarettes, and to stay distracted and develop new habits oppose to smoking. Pt's goal for the week is to eliminate 2 of her cigarettes. Exhaled carbon monoxide level was 37 ppm per Smokerlyzer (0-6 non-smoker). Will see pt back in 2 weeks for individual session.    Diagnosis: F17.200    Next Visit: 2 weeks

## 2022-08-30 NOTE — Clinical Note
Patient presents to smoking cessation clinic for follow up. Pt is back up to smoking  15 cpd, she tried to wear patches on her buttocks, but still had itching, burning, and rash so discontinued them. Pt not really using nicotine gum, will add 1 mg Chantix BID to her smoking cessation medication regimen. Has used Chantix before and had no negative side effects. Discussed and reviewed how to take Chantix and possible side effects. Discussed rate reduction with her and encouraged her to wait 15 min prior to smoking, move location of where she keeps her cigarettes, and to stay distracted and develop new habits oppose to smoking. Pt's goal for the week is to eliminate 2 of her cigarettes. Exhaled carbon monoxide level was 37 ppm per Smokerlyzer (0-6 non-smoker). Will see pt back in 2 weeks for individual session.

## 2022-09-06 ENCOUNTER — CLINICAL SUPPORT (OUTPATIENT)
Dept: SMOKING CESSATION | Facility: CLINIC | Age: 52
End: 2022-09-06
Payer: COMMERCIAL

## 2022-09-06 DIAGNOSIS — F17.200 NICOTINE DEPENDENCE: Primary | ICD-10-CM

## 2022-09-06 PROCEDURE — 99402 PREV MED CNSL INDIV APPRX 30: CPT | Mod: S$GLB,,,

## 2022-09-06 PROCEDURE — 99999 PR PBB SHADOW E&M-EST. PATIENT-LVL I: ICD-10-PCS | Mod: PBBFAC,,,

## 2022-09-06 PROCEDURE — 99402 PR PREVENT COUNSEL,INDIV,30 MIN: ICD-10-PCS | Mod: S$GLB,,,

## 2022-09-06 PROCEDURE — 99999 PR PBB SHADOW E&M-EST. PATIENT-LVL I: CPT | Mod: PBBFAC,,,

## 2022-09-06 NOTE — PROGRESS NOTES
Individual Follow-Up Form #5    9/6/2022    Quit Date: TBD    Clinical Status of Patient: Outpatient    Length of Service: 30 minutes    Continuing Medication: yes  Chantix    Other Medications: none     Target Symptoms: Withdrawal and medication side effects. The following were  rated moderate (3) to severe (4) on TCRS:  Moderate (3): nausea; reviewed with patient  Severe (4): none    Comments: Followed up with patient via telephone call on smoking cessation progress. Pt reports she is down to 8 cpd, from 15 cpd. She is on week 1 of Chantix, has been taking 1 mg once a day and will increase to BID tomorrow. She did report some nausea, but due to taking on an empty stomach. Advised pt to eat prior to taking and to drink a full glass of water with it. Pt feels stressed today due to dealing with [employer] issues, advised pt to stay positive and use deep breathing exercises to help cope with stress. Reviewed strategies, controlling environment, cues, triggers, new goals set. Introduced high risk situations with preparation interventions. Understanding urges, cravings, stress and relaxation. Pt is to eliminate 2 more cigarettes prior to next week. The patient denies any abnormal behavioral or mental changes at this time. Pt is to follow up next week in clinic.       Diagnosis: F17.200    Next Visit: 1 week

## 2022-09-13 ENCOUNTER — CLINICAL SUPPORT (OUTPATIENT)
Dept: SMOKING CESSATION | Facility: CLINIC | Age: 52
End: 2022-09-13
Payer: COMMERCIAL

## 2022-09-13 DIAGNOSIS — F17.200 NICOTINE DEPENDENCE: Primary | ICD-10-CM

## 2022-09-13 PROCEDURE — 99999 PR PBB SHADOW E&M-EST. PATIENT-LVL I: CPT | Mod: PBBFAC,,,

## 2022-09-13 PROCEDURE — 99999 PR PBB SHADOW E&M-EST. PATIENT-LVL I: ICD-10-PCS | Mod: PBBFAC,,,

## 2022-09-13 PROCEDURE — 99404 PR PREVENT COUNSEL,INDIV,60 MIN: ICD-10-PCS | Mod: S$GLB,,,

## 2022-09-13 PROCEDURE — 99404 PREV MED CNSL INDIV APPRX 60: CPT | Mod: S$GLB,,,

## 2022-09-13 NOTE — Clinical Note
Pt presents to clinic for smoking cessation follow up. Pt continues to smoke 8 cpd. Reports smoking a lot on Sunday morning due to stress. The patient remains on the prescribed tobacco cessation medication regimen of 1 mg Chantix BID. Complained of nausea even after eating a snack prior to taking medicine, advised pt to eat a full meal prior to taking. Exhaled carbon monoxide level was 16 ppm per Smokerlyzer (0-6 non-smoker). Pt's goal is to cut back to 6 cpd. The patient denies any abnormal behavioral or mental changes at this time.

## 2022-09-15 NOTE — PROGRESS NOTES
Individual Follow-Up Form #6    9/15/2022    Quit Date: TBD    Clinical Status of Patient: Outpatient    Length of Service: 60 minutes    Continuing Medication: yes  Chantix    Other Medications: nicotine gum     Target Symptoms: Withdrawal and medication side effects. The following were  rated moderate (3) to severe (4) on TCRS:  Moderate (3): nausea; reviewed with patient  Severe (4): none    Comments: Pt presents to clinic for smoking cessation follow up. Pt continues to smoke 8 cpd. Reports smoking a lot on Sunday morning due to stress. The patient remains on the prescribed tobacco cessation medication regimen of 1 mg Chantix BID. Complained of nausea even after eating a snack prior to taking medicine, advised pt to eat a full meal prior to taking. Reviewed strategies, habitual behavior, high risks situations, understanding urges and cravings, stress and relaxation with open discussion and additional interventions, Introduced lapses, relapses, understanding them and analyzing the situation of a lapse, conflict issues that may be linked to a lapse. Exhaled carbon monoxide level was 16 ppm per Smokerlyzer (0-6 non-smoker). Pt's goal is to cut back to 6 cpd. The patient denies any abnormal behavioral or mental changes at this time.       Diagnosis: F17.200    Next Visit: 2 weeks

## 2022-09-27 ENCOUNTER — CLINICAL SUPPORT (OUTPATIENT)
Dept: SMOKING CESSATION | Facility: CLINIC | Age: 52
End: 2022-09-27
Payer: COMMERCIAL

## 2022-09-27 DIAGNOSIS — F17.200 NICOTINE DEPENDENCE: Primary | ICD-10-CM

## 2022-09-27 PROCEDURE — 99999 PR PBB SHADOW E&M-EST. PATIENT-LVL I: ICD-10-PCS | Mod: PBBFAC,,,

## 2022-09-27 PROCEDURE — 99404 PR PREVENT COUNSEL,INDIV,60 MIN: ICD-10-PCS | Mod: S$GLB,,,

## 2022-09-27 PROCEDURE — 99404 PREV MED CNSL INDIV APPRX 60: CPT | Mod: S$GLB,,,

## 2022-09-27 PROCEDURE — 99999 PR PBB SHADOW E&M-EST. PATIENT-LVL I: CPT | Mod: PBBFAC,,,

## 2022-09-27 NOTE — Clinical Note
Pt presents to clinic for smoking cessation follow up. Pt continues to smoke 8 cpd, did not reach goal of 6 cpd.The patient remains on the prescribed tobacco cessation medication regimen of 1 mg Chantix QD, will increase to BID as tolerated. Reviewed strategies, habitual behavior, high risks situations, understanding urges and cravings, stress and relaxation with open discussion and additional interventions. Pt is to eliminate smoking while driving and is to cut back 6 cpd. Exhaled carbon monoxide level was 11 ppm per Smokerlyzer (0-6 non-smoker).The patient denies any abnormal behavioral or mental changes at this time. Pt is to follow up in 2 weeks.

## 2022-09-27 NOTE — PROGRESS NOTES
Individual Follow-Up Form #7    9/27/2022    Quit Date: TBD    Clinical Status of Patient: Outpatient    Length of Service: 60 minutes    Continuing Medication: yes  Chantix    Other Medications: gum     Target Symptoms: Withdrawal and medication side effects. The following were  rated moderate (3) to severe (4) on TCRS:  Moderate (3): none  Severe (4): none    Comments:  Pt presents to clinic for smoking cessation follow up. Pt continues to smoke 8 cpd, did not reach goal of 6 cpd.The patient remains on the prescribed tobacco cessation medication regimen of 1 mg Chantix QD, will increase to BID as tolerated. Reviewed strategies, habitual behavior, high risks situations, understanding urges and cravings, stress and relaxation with open discussion and additional interventions. Pt is to eliminate smoking while driving and is to cut back 6 cpd. Exhaled carbon monoxide level was 11 ppm per Smokerlyzer (0-6 non-smoker).The patient denies any abnormal behavioral or mental changes at this time. Pt is to follow up in 2 weeks.          Diagnosis: F17.200    Next Visit: 2 weeks

## 2022-09-28 ENCOUNTER — PATIENT OUTREACH (OUTPATIENT)
Dept: ADMINISTRATIVE | Facility: HOSPITAL | Age: 52
End: 2022-09-28
Payer: MEDICARE

## 2022-09-28 ENCOUNTER — PATIENT MESSAGE (OUTPATIENT)
Dept: ADMINISTRATIVE | Facility: HOSPITAL | Age: 52
End: 2022-09-28
Payer: MEDICARE

## 2022-10-11 ENCOUNTER — CLINICAL SUPPORT (OUTPATIENT)
Dept: SMOKING CESSATION | Facility: CLINIC | Age: 52
End: 2022-10-11
Payer: COMMERCIAL

## 2022-10-11 DIAGNOSIS — F17.200 NICOTINE DEPENDENCE: Primary | ICD-10-CM

## 2022-10-11 PROCEDURE — 99999 PR PBB SHADOW E&M-EST. PATIENT-LVL I: CPT | Mod: PBBFAC,,,

## 2022-10-11 PROCEDURE — 99404 PREV MED CNSL INDIV APPRX 60: CPT | Mod: S$GLB,,,

## 2022-10-11 PROCEDURE — 99999 PR PBB SHADOW E&M-EST. PATIENT-LVL I: ICD-10-PCS | Mod: PBBFAC,,,

## 2022-10-11 PROCEDURE — 99404 PR PREVENT COUNSEL,INDIV,60 MIN: ICD-10-PCS | Mod: S$GLB,,,

## 2022-10-11 NOTE — Clinical Note
Pt presents to clinic for smoking cessation follow up. Pt continues to smoke about 10 cpd, was not able to reach goal of 6 cpd. Is not keeping track of daily log and continues to smoke in the garage. The patient remains on the prescribed tobacco cessation medication regimen of 1 mg Chantix QD, was not able to tolerate BID due to nausea. Reviewed strategies, habitual behavior, high risks situations, understanding urges and cravings, stress and relaxation with open discussion and additional interventions. Pt is no longer buying 2 packs at a time, a pack is lasting her about 3 days. Exhaled carbon monoxide level was 12 ppm per Smokerlyzer (0-6 non-smoker).The patient denies any abnormal behavioral or mental changes at this time. Pt is to follow up in 2 weeks.

## 2022-10-12 NOTE — PROGRESS NOTES
Individual Follow-Up Form #8    10/11/22    Quit Date: TBD    Clinical Status of Patient: Outpatient    Length of Service: 60 minutes    Continuing Medication: yes  Chantix    Other Medications: gum     Target Symptoms: Withdrawal and medication side effects. The following were  rated moderate (3) to severe (4) on TCRS:  Moderate (3): nausea; reviewed with patient  Severe (4): none    Comments:  Pt presents to clinic for smoking cessation follow up. Pt continues to smoke about 10 cpd, was not able to reach goal of 6 cpd. Is not keeping track of daily log and continues to smoke in the garage. The patient remains on the prescribed tobacco cessation medication regimen of 1 mg Chantix QD, was not able to tolerate BID due to nausea. Reviewed strategies, habitual behavior, high risks situations, understanding urges and cravings, stress and relaxation with open discussion and additional interventions. Pt is no longer buying 2 packs at a time, a pack is lasting her about 3 days. Exhaled carbon monoxide level was 12 ppm per Smokerlyzer (0-6 non-smoker).The patient denies any abnormal behavioral or mental changes at this time. Pt is to follow up in 2 weeks.    Diagnosis: F17.200    Next Visit: 2 weeks

## 2022-10-13 DIAGNOSIS — I10 ESSENTIAL HYPERTENSION: ICD-10-CM

## 2022-10-13 DIAGNOSIS — Z78.0 ASYMPTOMATIC MENOPAUSAL STATE: ICD-10-CM

## 2022-10-25 ENCOUNTER — CLINICAL SUPPORT (OUTPATIENT)
Dept: SMOKING CESSATION | Facility: CLINIC | Age: 52
End: 2022-10-25
Payer: COMMERCIAL

## 2022-10-25 DIAGNOSIS — F17.200 NICOTINE DEPENDENCE: ICD-10-CM

## 2022-10-25 PROCEDURE — 99404 PREV MED CNSL INDIV APPRX 60: CPT | Mod: S$GLB,,,

## 2022-10-25 PROCEDURE — 99404 PR PREVENT COUNSEL,INDIV,60 MIN: ICD-10-PCS | Mod: S$GLB,,,

## 2022-10-25 PROCEDURE — 99999 PR PBB SHADOW E&M-EST. PATIENT-LVL I: CPT | Mod: PBBFAC,,,

## 2022-10-25 PROCEDURE — 99999 PR PBB SHADOW E&M-EST. PATIENT-LVL I: ICD-10-PCS | Mod: PBBFAC,,,

## 2022-10-25 RX ORDER — VARENICLINE TARTRATE 1 MG/1
1 TABLET, FILM COATED ORAL 2 TIMES DAILY
Qty: 60 TABLET | Refills: 0 | Status: SHIPPED | OUTPATIENT
Start: 2022-10-25 | End: 2023-03-14 | Stop reason: SDUPTHER

## 2022-10-25 NOTE — Clinical Note
Pt presents to smoking cessation clinic for follow up. Pt continues to smoke the same, about 8 cpd. Has not made any behavioral changes, continues to smoke in the garage. Pt has been dealing with health issues and the loss of her uncle, having a hard time focusing on her quit. The patient remains on the prescribed tobacco cessation medication regimen of 1 mg Chantix QD in conjunction with 2 mg nicotine gum without any negative side effects at this time. Will send refill for 2nd month of Chantix. Encouraged pt to stay positive and continue to work on her quit. Pt is to eliminate 1 of the cigarettes and work on behavior modification. Exhaled carbon monoxide level was 11 ppm per Smokerlyzer (0-6 non-smoker). The patient denies any abnormal behavioral or mental changes at this time. Pt is to follow up in 2 weeks.

## 2022-10-25 NOTE — PROGRESS NOTES
Individual Follow-Up Form #9    10/25/2022    Quit Date: TBD    Clinical Status of Patient: Outpatient    Length of Service: 60 minutes    Continuing Medication: yes  Chantix    Other Medications: gum     Target Symptoms: Withdrawal and medication side effects. The following were  rated moderate (3) to severe (4) on TCRS:  Moderate (3): none  Severe (4): none    Comments: Pt presents to smoking cessation clinic for follow up. Pt continues to smoke the same, about 8 cpd. Has not made any behavioral changes, continues to smoke in the garage. Pt has been dealing with health issues and the loss of her uncle, having a hard time focusing on her quit. The patient remains on the prescribed tobacco cessation medication regimen of 1 mg Chantix QD in conjunction with 2 mg nicotine gum without any negative side effects at this time. Will send refill for 2nd month of Chantix. Encouraged pt to stay positive and continue to work on her quit. Pt is to eliminate 1 of the cigarettes and work on behavior modification. Exhaled carbon monoxide level was 11 ppm per Smokerlyzer (0-6 non-smoker). The patient denies any abnormal behavioral or mental changes at this time. Pt is to follow up in 2 weeks.      Diagnosis: F17.200    Next Visit: 2 weeks

## 2022-11-08 ENCOUNTER — CLINICAL SUPPORT (OUTPATIENT)
Dept: SMOKING CESSATION | Facility: CLINIC | Age: 52
End: 2022-11-08
Payer: COMMERCIAL

## 2022-11-08 DIAGNOSIS — F17.200 NICOTINE DEPENDENCE: Primary | ICD-10-CM

## 2022-11-08 PROCEDURE — 90853 GROUP PSYCHOTHERAPY: CPT | Mod: S$GLB,,,

## 2022-11-08 PROCEDURE — 99999 PR PBB SHADOW E&M-EST. PATIENT-LVL I: ICD-10-PCS | Mod: PBBFAC,,,

## 2022-11-08 PROCEDURE — 99999 PR PBB SHADOW E&M-EST. PATIENT-LVL I: CPT | Mod: PBBFAC,,,

## 2022-11-08 PROCEDURE — 90853 PR GROUP PSYCHOTHERAPY: ICD-10-PCS | Mod: S$GLB,,,

## 2022-11-08 NOTE — PROGRESS NOTES
Site: [facility]  Date:  11/8/2022  Clinical Status of Patient: Outpatient   Length of Service and Code: 60 minutes - 72545   Number in Attendance: 2  Group Activities/Focus of Group:  orientation, client introductions, completion of TCRS (Tobacco Cessation Rating Scale) learned addiction model, cues/triggers, personal reasons for quitting, medications, goals, quit date    Target symptoms:  withdrawal and medication side effects             The following were rated moderate (3) to severe (4) on TCRS:       Moderate 3: none     Severe 4:   none  Patient's Response to Intervention:  Pt presents to smoking cessation clinic for follow up. Pt reports smoking more, about 15 cpd. Has not made any behavioral changes, continues to smoke in the garage. Asked pt to refrain from going in the garage, pt stated that's the only place where she feels at peace and enjoys going into her garage.The patient remains on the prescribed tobacco cessation medication regimen of 1 mg Chantix QD without any negative side effects at this time. Pt is on second month of Chantix, pt reports some cigarettes are not as enjoyable and taste different. Educated pt on how Chantix works, it is working; however she continues to smoke out of habit and is working against Chantix. Pt is to attempt a 24 hr quit challenge next Thursday 11/17 on the [employer]. Encouraged pt to stay positive and continue to work on her quit. Exhaled carbon monoxide level was 9 ppm per Smokerlyzer (0-6 non-smoker). The patient denies any abnormal behavioral or mental changes at this time. Pt is to follow up in 2 weeks.     Progress Toward Goals and Other Mental Status Changes: The patient denies any abnormal behavioral or mental changes at this time.     Diagnosis: Z72.0  Plan: The patient will continue with group therapy sessions and medication regimen prescribed with management by physician or Cessation Clinic Provider. Patient will inform Smoking Clinic Cessation  Counselor of symptoms as rated high on TCRS.    Return to Clinic: 2 weeks

## 2022-11-08 NOTE — Clinical Note
Pt presents to smoking cessation clinic for follow up. Pt reports smoking more, about 15 cpd. Has not made any behavioral changes, continues to smoke in the garage. Asked pt to refrain from going in the garage, pt stated that's the only place where she feels at peace and enjoys going into her garage.The patient remains on the prescribed tobacco cessation medication regimen of 1 mg Chantix QD without any negative side effects at this time. Pt is on second month of Chantix, pt reports some cigarettes are not as enjoyable and taste different. Educated pt on how Chantix works, it is working; however she continues to smoke out of habit and is working against Chantix. Pt is to attempt a 24 hr quit challenge next Thursday 11/17 on the [employer]. Encouraged pt to stay positive and continue to work on her quit. Exhaled carbon monoxide level was 9 ppm per Smokerlyzer (0-6 non-smoker). The patient denies any abnormal behavioral or mental changes at this time. Pt is to follow up in 2 weeks.

## 2022-11-16 ENCOUNTER — CLINICAL SUPPORT (OUTPATIENT)
Dept: SMOKING CESSATION | Facility: CLINIC | Age: 52
End: 2022-11-16
Payer: COMMERCIAL

## 2022-11-16 DIAGNOSIS — F17.200 NICOTINE DEPENDENCE: Primary | ICD-10-CM

## 2022-11-16 PROCEDURE — 99407 PR TOBACCO USE CESSATION INTENSIVE >10 MINUTES: ICD-10-PCS | Mod: S$GLB,,,

## 2022-11-16 PROCEDURE — 99407 BEHAV CHNG SMOKING > 10 MIN: CPT | Mod: S$GLB,,,

## 2022-11-16 NOTE — PROGRESS NOTES
Called pt to f/u on her 3 month smoking cessation quit status. Pt stated she is still smoking, but still actively enrolled in program with f/u appt scheduled. Reminded patient of [employer] tomorrow. She is to go 24 hrs with no smoking. Informed her of benefit period, phone follow ups, and contact information. Will complete smart form and will continue to follow up on episode.

## 2022-11-22 ENCOUNTER — CLINICAL SUPPORT (OUTPATIENT)
Dept: SMOKING CESSATION | Facility: CLINIC | Age: 52
End: 2022-11-22
Payer: COMMERCIAL

## 2022-11-22 DIAGNOSIS — F17.200 NICOTINE DEPENDENCE: Primary | ICD-10-CM

## 2022-11-22 PROCEDURE — 90853 GROUP PSYCHOTHERAPY: CPT | Mod: S$GLB,,,

## 2022-11-22 PROCEDURE — 90853 PR GROUP PSYCHOTHERAPY: ICD-10-PCS | Mod: S$GLB,,,

## 2022-11-22 PROCEDURE — 99999 PR PBB SHADOW E&M-EST. PATIENT-LVL I: CPT | Mod: PBBFAC,,,

## 2022-11-22 PROCEDURE — 99999 PR PBB SHADOW E&M-EST. PATIENT-LVL I: ICD-10-PCS | Mod: PBBFAC,,,

## 2022-11-22 NOTE — PROGRESS NOTES
Smoking Cessation Group Session #2    Site: [facility]  Date:  11/22/2022  Clinical Status of Patient: Outpatient   Length of Service and Code: 60 minutes - 54587   Number in Attendance: 2  Group Activities/Focus of Group:  Sharing last weeks challenges, triggers, and coping activities to remain quit and/ or keep making progress toward cessation, completion of TCRS (Tobacco Cessation Rating Scale) learned addiction model, personal reasons for quitting, medications, goals, quit date.      Target symptoms:  withdrawal and medication side effects             The following were rated moderate (3) to severe (4) on TCRS:       Moderate 3: none     Severe 4:   none  Patient's Response to Intervention: Pt presents to smoking cessation clinic for follow up visit. Pt is down to smoking 6-7 cpd. Pt was to attempt 24 hr quit challenge last Thursday 11/17 for the [employer], but reports lighting up that morning due to frustration. Her truck was giving her issues, and had to be taking to the shop. However; the next day she attempted a 24 hr quit challenge and was able to go with no cigarettes. She ran out, didn't have any and that following day she bummed one from her son. The patient remains on the prescribed tobacco cessation medication regimen of 1 mg Chantix QAM without any negative side effects at this time. Pt is to continue to take 1 mg Chantix during the day and will try and take 1/2 pill of Chantix in the evening. Exhaled carbon monoxide level was 7 ppm per Smokerlyzer (0-6 non-smoker).       Progress Toward Goals and Other Mental Status Changes: The patient denies any abnormal behavioral or mental changes at this time.     Diagnosis: Z72.0  Plan: The patient will continue with group therapy sessions and medication regimen prescribed with management by physician or by the Cessation Clinic Provider. Patient will inform Smoking Cessation Counselor of symptoms as rated high on TCRS.    Return to Clinic: 2  weeks       Planned Quit Date: TBD

## 2022-12-06 ENCOUNTER — CLINICAL SUPPORT (OUTPATIENT)
Dept: SMOKING CESSATION | Facility: CLINIC | Age: 52
End: 2022-12-06
Payer: COMMERCIAL

## 2022-12-06 DIAGNOSIS — F17.200 NICOTINE DEPENDENCE: Primary | ICD-10-CM

## 2022-12-06 PROCEDURE — 99999 PR PBB SHADOW E&M-EST. PATIENT-LVL I: ICD-10-PCS | Mod: PBBFAC,,,

## 2022-12-06 PROCEDURE — 99404 PR PREVENT COUNSEL,INDIV,60 MIN: ICD-10-PCS | Mod: S$GLB,,,

## 2022-12-06 PROCEDURE — 99404 PREV MED CNSL INDIV APPRX 60: CPT | Mod: S$GLB,,,

## 2022-12-06 PROCEDURE — 99999 PR PBB SHADOW E&M-EST. PATIENT-LVL I: CPT | Mod: PBBFAC,,,

## 2022-12-07 NOTE — PROGRESS NOTES
Individual Follow-Up Form #1    12/6/22    Quit Date: TBD    Clinical Status of Patient: Outpatient    Length of Service: 60 minutes    Continuing Medication: yes  Chantix    Other Medications: none     Target Symptoms: Withdrawal and medication side effects. The following were  rated moderate (3) to severe (4) on TCRS:  Moderate (3): none  Severe (4): none    Comments: Patient presents to smoking cessation clinic for follow up visit. Pt is down to 4-5 cpd. The patient remains on the prescribed tobacco cessation medication regimen of 1 mg Chantix QAM without any negative side effects at this time. She had tried taking 1/2 pill in the evening, but reports she still felt nauseated so only taking once a day. Pt has been staying busy, which has helped her cut back on smoking. Pt is really motivated, will continue to cut back and is to attempt another dry challenge prior to next visit. Her goal is to be tobacco free by [place]. The patient denies any abnormal behavioral or mental changes at this time. Exhaled carbon monoxide level was 6 ppm per Smokerlyzer (0-6 non-smoker). Pt will follow up in 2 weeks.        Diagnosis: F17.200    Next Visit: 2 weeks

## 2022-12-20 ENCOUNTER — CLINICAL SUPPORT (OUTPATIENT)
Dept: SMOKING CESSATION | Facility: CLINIC | Age: 52
End: 2022-12-20
Payer: COMMERCIAL

## 2022-12-20 DIAGNOSIS — F17.200 NICOTINE DEPENDENCE: Primary | ICD-10-CM

## 2022-12-20 PROCEDURE — 99404 PREV MED CNSL INDIV APPRX 60: CPT | Mod: S$GLB,,,

## 2022-12-20 PROCEDURE — 99999 PR PBB SHADOW E&M-EST. PATIENT-LVL I: ICD-10-PCS | Mod: PBBFAC,,,

## 2022-12-20 PROCEDURE — 99404 PR PREVENT COUNSEL,INDIV,60 MIN: ICD-10-PCS | Mod: S$GLB,,,

## 2022-12-20 PROCEDURE — 99999 PR PBB SHADOW E&M-EST. PATIENT-LVL I: CPT | Mod: PBBFAC,,,

## 2022-12-20 NOTE — PROGRESS NOTES
Individual Follow-Up Form #2    12/20/2022    Quit Date: TBD    Clinical Status of Patient: Outpatient    Length of Service: 60 minutes    Continuing Medication: yes  Chantix    Other Medications: none     Target Symptoms: Withdrawal and medication side effects. The following were  rated moderate (3) to severe (4) on TCRS:  Moderate (3): none  Severe (4): none    Comments: Patient presents to smoking cessation clinic for follow up visit. Pt continues to smoke about 6 cpd.  The patient remains on the prescribed tobacco cessation medication regimen of 1 mg Chantix QAM without any negative side effects at this time. Pt will continue to cut back and is to attempt another dry challenge prior to next visit. Her goal is to be tobacco free by New Years. The patient denies any abnormal behavioral or mental changes at this time. Exhaled carbon monoxide level was 11 ppm per Smokerlyzer (0-6 non-smoker). Pt will follow up in 2 weeks.       Diagnosis: F17.200    Next Visit: 2 weeks

## 2022-12-23 ENCOUNTER — TELEPHONE (OUTPATIENT)
Dept: INTERNAL MEDICINE | Facility: CLINIC | Age: 52
End: 2022-12-23
Payer: MEDICARE

## 2022-12-23 NOTE — TELEPHONE ENCOUNTER
Called patient to inform them that we do not have an available appt until 2024 but the patient did not answer and I was unable to leave a voicemail.

## 2022-12-23 NOTE — TELEPHONE ENCOUNTER
----- Message from Matt Abarca sent at 12/23/2022 11:14 AM CST -----  Contact: pt  .Type:  Needs Medical Advice    Who Called: pt    Would the patient rather a call back or a response via [employer]?  Call back  Best Call Back Number: [phone]  Additional Information:  Pt. Would like to establish care with the provider.

## 2023-01-03 ENCOUNTER — CLINICAL SUPPORT (OUTPATIENT)
Dept: SMOKING CESSATION | Facility: CLINIC | Age: 53
End: 2023-01-03
Payer: COMMERCIAL

## 2023-01-03 DIAGNOSIS — F17.200 NICOTINE DEPENDENCE: Primary | ICD-10-CM

## 2023-01-03 PROCEDURE — 99404 PREV MED CNSL INDIV APPRX 60: CPT | Mod: S$GLB,,,

## 2023-01-03 PROCEDURE — 99404 PR PREVENT COUNSEL,INDIV,60 MIN: ICD-10-PCS | Mod: S$GLB,,,

## 2023-01-03 NOTE — Clinical Note
Patient presents to smoking cessation clinic for follow up visit. Pt continues to smoke about 3 cpd.  The patient remains on the prescribed tobacco cessation medication regimen of 1 mg Chantix QAM without any negative side effects at this time. Pt will continue to cut back and is to attempt another dry challenge prior to next visit. Pt reports she was able to go 3 days with no cigarettes, but then her son bought her a pack and she lapsed. Advised pt to get rid of cigarettes left and attempt to get back on track and quit. The patient denies any abnormal behavioral or mental changes at this time. Exhaled carbon monoxide level was 11 ppm per Smokerlyzer (0-6 non-smoker). Pt will follow up in 2 weeks.

## 2023-01-05 NOTE — PROGRESS NOTES
Individual Follow-Up Form #3    1/3/23    Quit Date: TBD    Clinical Status of Patient: Outpatient    Length of Service: 60 minutes    Continuing Medication: yes  Chantix    Other Medications: none     Target Symptoms: Withdrawal and medication side effects. The following were  rated moderate (3) to severe (4) on TCRS:  Moderate (3): none  Severe (4): none    Comments: Patient presents to smoking cessation clinic for follow up visit. Pt continues to smoke about 3 cpd.  The patient remains on the prescribed tobacco cessation medication regimen of 1 mg Chantix QAM without any negative side effects at this time. Pt will continue to cut back and is to attempt another dry challenge prior to next visit. Pt reports she was able to go 3 days with no cigarettes, but then her son bought her a pack and she lapsed. Advised pt to get rid of cigarettes left and attempt to get back on track and quit. The patient denies any abnormal behavioral or mental changes at this time. Exhaled carbon monoxide level was 11 ppm per Smokerlyzer (0-6 non-smoker). Pt will follow up in 2 weeks.    Diagnosis: F17.200    Next Visit: 2 weeks

## 2023-01-17 ENCOUNTER — CLINICAL SUPPORT (OUTPATIENT)
Dept: SMOKING CESSATION | Facility: CLINIC | Age: 53
End: 2023-01-17
Payer: COMMERCIAL

## 2023-01-17 DIAGNOSIS — F17.200 NICOTINE DEPENDENCE: Primary | ICD-10-CM

## 2023-01-17 PROCEDURE — 99404 PR PREVENT COUNSEL,INDIV,60 MIN: ICD-10-PCS | Mod: S$GLB,,,

## 2023-01-17 PROCEDURE — 99404 PREV MED CNSL INDIV APPRX 60: CPT | Mod: S$GLB,,,

## 2023-01-17 PROCEDURE — 99999 PR PBB SHADOW E&M-EST. PATIENT-LVL I: CPT | Mod: PBBFAC,,,

## 2023-01-17 PROCEDURE — 99999 PR PBB SHADOW E&M-EST. PATIENT-LVL I: ICD-10-PCS | Mod: PBBFAC,,,

## 2023-01-17 NOTE — PROGRESS NOTES
Individual Follow-Up Form #4    1/17/2023    Quit Date: TBD    Clinical Status of Patient: Outpatient    Length of Service: 60 minutes    Continuing Medication: yes  Chantix    Other Medications: none     Target Symptoms: Withdrawal and medication side effects. The following were  rated moderate (3) to severe (4) on TCRS:  Moderate (3): none  Severe (4): none    Comments:  Patient presents to smoking cessation clinic for follow up visit. Pt continues to smoke on some days. Pt reports she was able to go another 3 days with no cigarettes, but then has stomach issues and no bowel movements when she doesn't smoke, so lights up to feel better and to be able to have bowel movement. Pt reports she is drinking plenty of water, but not finding any relief. Advised pt to continue to stay positive and see how far she has come on becoming tobacco free. Pt is still on 1 mg of Chantix QAM on somedays, not taking everyday due to not eating or forgetting to take it. The patient denies any abnormal behavioral or mental changes at this time. Exhaled carbon monoxide level was 5 ppm per Smokerlyzer (0-6 non-smoker). Pt will follow up in 2 weeks.       Diagnosis: F17.200    Next Visit: 2 weeks

## 2023-01-31 ENCOUNTER — CLINICAL SUPPORT (OUTPATIENT)
Dept: SMOKING CESSATION | Facility: CLINIC | Age: 53
End: 2023-01-31
Payer: COMMERCIAL

## 2023-01-31 DIAGNOSIS — F17.200 NICOTINE DEPENDENCE: Primary | ICD-10-CM

## 2023-01-31 PROCEDURE — 99999 PR PBB SHADOW E&M-EST. PATIENT-LVL I: ICD-10-PCS | Mod: PBBFAC,,,

## 2023-01-31 PROCEDURE — 99404 PR PREVENT COUNSEL,INDIV,60 MIN: ICD-10-PCS | Mod: S$GLB,,,

## 2023-01-31 PROCEDURE — 99404 PREV MED CNSL INDIV APPRX 60: CPT | Mod: S$GLB,,,

## 2023-01-31 PROCEDURE — 99999 PR PBB SHADOW E&M-EST. PATIENT-LVL I: CPT | Mod: PBBFAC,,,

## 2023-01-31 NOTE — PROGRESS NOTES
Individual Follow-Up Form #5    1/31/2023    Quit Date: TBD    Clinical Status of Patient: Outpatient    Length of Service: 60 minutes    Continuing Medication: yes  Chantix    Other Medications: none     Target Symptoms: Withdrawal and medication side effects. The following were  rated moderate (3) to severe (4) on TCRS:  Moderate (3): none  Severe (4): none    Comments:  Patient presents to smoking cessation clinic for follow up visit. Pt continues to smoke a few cigarettes a day. Pt remains on1 mg of Chantix QAM on somedays, not taking everyday due to not eating or forgetting to take it. The patient denies any abnormal behavioral or mental changes at this time. Exhaled carbon monoxide level was 7 ppm per Smokerlyzer (0-6 non-smoker). Pt is to attempt another 24 hr quit challenge prior to next visit. Discussed and encouraged the 4 D's (delay, distraction, drink water, and deep breathing) when she has a craving to smoke. Pt will follow up in 2 weeks.       Diagnosis: F17.200    Next Visit: 2 weeks

## 2023-02-06 ENCOUNTER — CLINICAL SUPPORT (OUTPATIENT)
Dept: SMOKING CESSATION | Facility: CLINIC | Age: 53
End: 2023-02-06
Payer: COMMERCIAL

## 2023-02-06 DIAGNOSIS — F17.200 NICOTINE DEPENDENCE: Primary | ICD-10-CM

## 2023-02-06 PROCEDURE — 99407 BEHAV CHNG SMOKING > 10 MIN: CPT | Mod: S$GLB,,,

## 2023-02-06 PROCEDURE — 99407 PR TOBACCO USE CESSATION INTENSIVE >10 MINUTES: ICD-10-PCS | Mod: S$GLB,,,

## 2023-02-06 NOTE — PROGRESS NOTES
Spoke with patient today in regard to smoking cessation progress for 6 month phone follow up on Quit 1. Patient not tobacco free at this time but stated that she is close to Quitting. Commended patient on their progress thus far.  We discussed learning to handle the touch times without a cigarette.  Patient is very happy with the help and support she is receiving from CTTS, Miroslava Draper.   Patient currently enrolled in program for Quit 1 and has an appointment scheduled with her CTTS.   Informed patient of benefit period, future follow ups, and contact information if any further help or support is needed.  Will complete smart form for 6 month follow up on Quit attempt # 1.

## 2023-02-09 ENCOUNTER — OFFICE VISIT (OUTPATIENT)
Dept: PRIMARY CARE CLINIC | Facility: CLINIC | Age: 53
End: 2023-02-09
Payer: MEDICARE

## 2023-02-09 VITALS
HEART RATE: 83 BPM | SYSTOLIC BLOOD PRESSURE: 158 MMHG | DIASTOLIC BLOOD PRESSURE: 102 MMHG | WEIGHT: 177.5 LBS | OXYGEN SATURATION: 100 % | HEIGHT: 63 IN | BODY MASS INDEX: 31.45 KG/M2

## 2023-02-09 DIAGNOSIS — Z23 NEED FOR PNEUMOCOCCAL VACCINATION: ICD-10-CM

## 2023-02-09 DIAGNOSIS — Z00.00 ANNUAL PHYSICAL EXAM: Primary | ICD-10-CM

## 2023-02-09 DIAGNOSIS — I67.5 MOYA MOYA DISEASE: ICD-10-CM

## 2023-02-09 DIAGNOSIS — E78.5 DYSLIPIDEMIA: ICD-10-CM

## 2023-02-09 DIAGNOSIS — I77.1 STRICTURE OF ARTERY: ICD-10-CM

## 2023-02-09 DIAGNOSIS — Z79.899 OTHER LONG TERM (CURRENT) DRUG THERAPY: ICD-10-CM

## 2023-02-09 DIAGNOSIS — E55.9 VITAMIN D DEFICIENCY: ICD-10-CM

## 2023-02-09 DIAGNOSIS — I10 ESSENTIAL HYPERTENSION: ICD-10-CM

## 2023-02-09 DIAGNOSIS — D22.9 ATYPICAL MOLE: ICD-10-CM

## 2023-02-09 DIAGNOSIS — I69.351 HEMIPLEGIA AND HEMIPARESIS FOLLOWING CEREBRAL INFARCTION AFFECTING RIGHT DOMINANT SIDE: ICD-10-CM

## 2023-02-09 DIAGNOSIS — Z12.31 ENCOUNTER FOR SCREENING MAMMOGRAM FOR MALIGNANT NEOPLASM OF BREAST: ICD-10-CM

## 2023-02-09 PROCEDURE — 3044F HG A1C LEVEL LT 7.0%: CPT | Mod: CPTII,S$GLB,, | Performed by: STUDENT IN AN ORGANIZED HEALTH CARE EDUCATION/TRAINING PROGRAM

## 2023-02-09 PROCEDURE — 3077F PR MOST RECENT SYSTOLIC BLOOD PRESSURE >= 140 MM HG: ICD-10-PCS | Mod: CPTII,S$GLB,, | Performed by: STUDENT IN AN ORGANIZED HEALTH CARE EDUCATION/TRAINING PROGRAM

## 2023-02-09 PROCEDURE — 99214 OFFICE O/P EST MOD 30 MIN: CPT | Mod: 25,S$GLB,, | Performed by: STUDENT IN AN ORGANIZED HEALTH CARE EDUCATION/TRAINING PROGRAM

## 2023-02-09 PROCEDURE — G0009 ADMIN PNEUMOCOCCAL VACCINE: HCPCS | Mod: S$GLB,,, | Performed by: STUDENT IN AN ORGANIZED HEALTH CARE EDUCATION/TRAINING PROGRAM

## 2023-02-09 PROCEDURE — 3008F PR BODY MASS INDEX (BMI) DOCUMENTED: ICD-10-PCS | Mod: CPTII,S$GLB,, | Performed by: STUDENT IN AN ORGANIZED HEALTH CARE EDUCATION/TRAINING PROGRAM

## 2023-02-09 PROCEDURE — 90677 PCV20 VACCINE IM: CPT | Mod: S$GLB,,, | Performed by: STUDENT IN AN ORGANIZED HEALTH CARE EDUCATION/TRAINING PROGRAM

## 2023-02-09 PROCEDURE — 99214 PR OFFICE/OUTPT VISIT, EST, LEVL IV, 30-39 MIN: ICD-10-PCS | Mod: 25,S$GLB,, | Performed by: STUDENT IN AN ORGANIZED HEALTH CARE EDUCATION/TRAINING PROGRAM

## 2023-02-09 PROCEDURE — 3080F DIAST BP >= 90 MM HG: CPT | Mod: CPTII,S$GLB,, | Performed by: STUDENT IN AN ORGANIZED HEALTH CARE EDUCATION/TRAINING PROGRAM

## 2023-02-09 PROCEDURE — G0009 PNEUMOCOCCAL CONJUGATE VACCINE 20-VALENT: ICD-10-PCS | Mod: S$GLB,,, | Performed by: STUDENT IN AN ORGANIZED HEALTH CARE EDUCATION/TRAINING PROGRAM

## 2023-02-09 PROCEDURE — 90677 PNEUMOCOCCAL CONJUGATE VACCINE 20-VALENT: ICD-10-PCS | Mod: S$GLB,,, | Performed by: STUDENT IN AN ORGANIZED HEALTH CARE EDUCATION/TRAINING PROGRAM

## 2023-02-09 PROCEDURE — 3077F SYST BP >= 140 MM HG: CPT | Mod: CPTII,S$GLB,, | Performed by: STUDENT IN AN ORGANIZED HEALTH CARE EDUCATION/TRAINING PROGRAM

## 2023-02-09 PROCEDURE — 3008F BODY MASS INDEX DOCD: CPT | Mod: CPTII,S$GLB,, | Performed by: STUDENT IN AN ORGANIZED HEALTH CARE EDUCATION/TRAINING PROGRAM

## 2023-02-09 PROCEDURE — 3044F PR MOST RECENT HEMOGLOBIN A1C LEVEL <7.0%: ICD-10-PCS | Mod: CPTII,S$GLB,, | Performed by: STUDENT IN AN ORGANIZED HEALTH CARE EDUCATION/TRAINING PROGRAM

## 2023-02-09 PROCEDURE — 3080F PR MOST RECENT DIASTOLIC BLOOD PRESSURE >= 90 MM HG: ICD-10-PCS | Mod: CPTII,S$GLB,, | Performed by: STUDENT IN AN ORGANIZED HEALTH CARE EDUCATION/TRAINING PROGRAM

## 2023-02-09 PROCEDURE — 99999 PR PBB SHADOW E&M-EST. PATIENT-LVL IV: CPT | Mod: PBBFAC,,, | Performed by: STUDENT IN AN ORGANIZED HEALTH CARE EDUCATION/TRAINING PROGRAM

## 2023-02-09 PROCEDURE — 99999 PR PBB SHADOW E&M-EST. PATIENT-LVL IV: ICD-10-PCS | Mod: PBBFAC,,, | Performed by: STUDENT IN AN ORGANIZED HEALTH CARE EDUCATION/TRAINING PROGRAM

## 2023-02-09 NOTE — PROGRESS NOTES
SUBJECTIVE     Chief Complaint   Patient presents with    Annual Exam    Establish Care       HPI  Darwin Beltrán is a 52 y.o. female with medical diagnoses as listed in the medical history and problem list that presents for annual exam.    Pt is UTD on age appropriate CA screening.    Family, social, surgical Hx reviewed     Health Maintenance         Date Due Completion Date    Pneumococcal Vaccines (Age 0-64) (2 - PCV) 12/07/2021 12/7/2020    COVID-19 Vaccine (4 - Booster for Moderna series) 04/19/2022 2/22/2022    Influenza Vaccine (1) Never done ---    Mammogram 03/11/2023 3/11/2022    High Dose Statin 03/09/2023 (Originally 8/15/1991) ---    Shingles Vaccine (1 of 2) 03/09/2023 (Originally 8/15/2020) ---    Hemoglobin A1c (Diabetic Prevention Screening) 02/08/2024 2/8/2021    Cervical Cancer Screening 02/08/2026 2/8/2021    Lipid Panel 02/08/2026 2/8/2021    Colorectal Cancer Screening 02/24/2026 2/24/2021    TETANUS VACCINE 02/18/2030 2/18/2020                Hypertension:  Prescribed amlodipine 10 mg daily in the past.  Blood pressure is not currently elevated.  Patient does not want to take medication because she prefers to do things naturally with lifestyle.      Dyslipidemia:  Due for repeat lipid panel.  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  Not currently on a blood thinner.    Needs dermatology referral    Due for mammogram    Vitamin-D deficiency:  Not currently on supplementation.  Need repeat vitamin-D level.    PAST MEDICAL HISTORY:  Past Medical History:   Diagnosis Date    Chronic migraine 06/30/2021    Elevated blood pressure reading in office without diagnosis of hypertension 11/11/2020    HTN (hypertension)     Moyamoya 2011    Stroke        PAST SURGICAL HISTORY:  Past Surgical History:   Procedure Laterality Date    BILATERAL TUBAL LIGATION  2009    COLONOSCOPY N/A 2/24/2021    Procedure:  COLONOSCOPY/SUPREP;  Surgeon: Jose Williamson MD;  Location: [facility];  Service: Endoscopy;  Laterality: N/A;  covid test   Kelly    ORIF TIBIA & FIBULA FRACTURES Right 2004    Hardware in Place       SOCIAL HISTORY:  Social History     Socioeconomic History    Marital status:    Tobacco Use    Smoking status: Every Day     Packs/day: 0.50     Types: Cigarettes    Smokeless tobacco: Current   Substance and Sexual Activity    Alcohol use: Yes     Comment: occasionally (2 drinks max per occasion)    Drug use: Never    Sexual activity: Yes     Partners: Male     Birth control/protection: None   Social History Narrative    Tobacco: Initiated in 20s; 1ppd    EtOH: Occasional; few times per year; has max use of 3 drinks at once    Drug: None    Employment: Unemployed. Receives disability for medical condition ([place])    Education: Bachelor's Degree     Lives with 3 children              FAMILY HISTORY:  Family History   Problem Relation Age of Onset    Breast cancer Mother 57         in 60s    Prostate cancer Father     Hypertension Father     Breast cancer Paternal Aunt     Breast cancer Paternal Grandmother     Lung cancer Brother     Colon cancer Brother        ALLERGIES AND MEDICATIONS: updated and reviewed.  Review of patient's allergies indicates:  No Known Allergies  Current Outpatient Medications   Medication Sig Dispense Refill    varenicline (CHANTIX) 1 mg Tab Take 1 tablet (1 mg total) by mouth 2 (two) times daily. /MULLINS 60 tablet 0    amLODIPine (NORVASC) 10 MG tablet TAKE 1 TABLET BY MOUTH EVERY DAY (Patient not taking: Reported on 2023) 30 tablet 0    aspirin (ECOTRIN) 81 MG EC tablet Take 81 mg by mouth once daily.      erenumab-aooe (AIMOVIG AUTOINJECTOR) 140 mg/mL autoinjector Inject 1 mL (140 mg total) into the skin every 28 days. (Patient not taking: Reported on 3/9/2022) 1 Syringe 11    ergocalciferol (ERGOCALCIFEROL) 50,000 unit Cap Take 1 capsule (50,000 Units  "total) by mouth every 7 days. for 12 doses 4 capsule 2    hydroCHLOROthiazide (HYDRODIURIL) 25 MG tablet Take 1 tablet (25 mg total) by mouth once daily. (Patient not taking: Reported on 2/9/2023) 90 tablet 3    mv-mn/iron/folic acid/herb 190 (VITAMIN D3 COMPLETE ORAL) Take 1 tablet by mouth once daily.      nicotine (NICODERM CQ) 14 mg/24 hr Place 1 patch onto the skin once daily. (Patient not taking: Reported on 8/30/2022) 14 patch 0    nicotine (NICODERM CQ) 21 mg/24 hr Place 1 patch onto the skin once daily. (Patient not taking: Reported on 8/30/2022) 14 patch 0    nicotine polacrilex (NICORETTE) 4 MG Gum Take 1 each (4 mg total) by mouth as needed (1-2 pieces/hr in place of a cigarette. Maximum of 10 pieces/day.). (Patient not taking: Reported on 2/9/2023) 100 each 0     No current facility-administered medications for this visit.     Facility-Administered Medications Ordered in Other Visits   Medication Dose Route Frequency Provider Last Rate Last Admin    0.9%  NaCl infusion   Intravenous Continuous Jose Williamson MD 20 mL/hr at 02/24/21 1146 New Bag at 02/24/21 1146    sodium chloride 0.9% flush 10 mL  10 mL Intravenous PRN Jose Williamson MD           ROS  Review of Systems   Constitutional:  Negative for fever and weight loss.   Respiratory:  Negative for cough and shortness of breath.    Cardiovascular:  Negative for chest pain and palpitations.   Gastrointestinal:  Negative for abdominal pain, constipation, diarrhea, nausea and vomiting.   Genitourinary:  Negative for dysuria.   Musculoskeletal:  Negative for back pain and joint pain.   Skin:  Negative for rash.   Neurological:  Negative for dizziness, weakness and headaches.   Psychiatric/Behavioral:  Negative for depression. The patient is not nervous/anxious.        OBJECTIVE     Physical Exam  Vitals:    02/09/23 1404   BP: (!) 158/102   Pulse: 83    Body mass index is 31.44 kg/m².  Weight: 80.5 kg (177 lb 7.5 oz)   Height: 5' 3" (160 cm) "     Physical Exam  HENT:      Head: Normocephalic and atraumatic.      Nose: Nose normal.      Mouth/Throat:      Mouth: Mucous membranes are moist.      Pharynx: Oropharynx is clear.   Eyes:      Extraocular Movements: Extraocular movements intact.      Conjunctiva/sclera: Conjunctivae normal.      Pupils: Pupils are equal, round, and reactive to light.   Cardiovascular:      Rate and Rhythm: Normal rate and regular rhythm.   Pulmonary:      Effort: Pulmonary effort is normal.   Musculoskeletal:         General: No swelling. Normal range of motion.      Cervical back: Normal range of motion.      Right lower leg: No edema.      Left lower leg: No edema.   Skin:     General: Skin is warm.      Findings: No lesion or rash.   Neurological:      General: No focal deficit present.      Mental Status: She is alert and oriented to person, place, and time.      Motor: No weakness.   Psychiatric:         Mood and Affect: Mood normal.         Thought Content: Thought content normal.             ASSESSMENT     52 y.o. female with     1. Annual physical exam    2. Essential hypertension    3. Vitamin D deficiency    4. Dyslipidemia    5. Hemiplegia and hemiparesis following cerebral infarction affecting right dominant side    6. Stricture of artery    7. Borges borges disease    8. Encounter for screening mammogram for malignant neoplasm of breast    9. Other long term (current) drug therapy    10. Need for pneumococcal vaccination    11. Atypical mole        PLAN:     1. Annual physical exam    2. Essential hypertension  Overview:  Not at goal.    Not on recommended medication.  Understands risk of not being on medication.    Orders:  -     Comprehensive Metabolic Panel; Future; Expected date: 02/09/2023  -     CBC Auto Differential; Future; Expected date: 02/09/2023    3. Vitamin D deficiency  Overview:  Not on supplementation.  Due for repeat level    Orders:  -     Vitamin D; Future; Expected date: 02/09/2023    4.  Dyslipidemia  -     Hemoglobin A1C; Future; Expected date: 02/09/2023  -     T4, Free; Future; Expected date: 02/09/2023  -     TSH; Future; Expected date: 02/09/2023  -     Lipid Panel; Future; Expected date: 02/09/2023    5. Hemiplegia and hemiparesis following cerebral infarction affecting right dominant side  Overview:  Stable right-sided deficits including  hand weakness and contracture      6. Stricture of artery  Overview:  Secondary to borges borges dx      7. Borges borges disease  Overview:  Not taking blood pressure medicine or blood thinner. Understands risks      8. Encounter for screening mammogram for malignant neoplasm of breast    9. Other long term (current) drug therapy  -     Hemoglobin A1C; Future; Expected date: 02/09/2023  -     T4, Free; Future; Expected date: 02/09/2023  -     TSH; Future; Expected date: 02/09/2023  -     Lipid Panel; Future; Expected date: 02/09/2023  -     Comprehensive Metabolic Panel; Future; Expected date: 02/09/2023  -     CBC Auto Differential; Future; Expected date: 02/09/2023    10. Need for pneumococcal vaccination  -     (In Office Administered) Pneumococcal Conjugate Vaccine (20 Valent) (IM)    11. Atypical mole  -     Ambulatory referral/consult to Dermatology; Future; Expected date: 02/16/2023        Discussed age and gender appropriate screenings at this visit and encouraged a healthy diet low in simple carbohydrates, and increased physical activity.  Counseled on medically appropriate vaccines based on age and current health condition.  Screening test reviewed and discussed with patient.      RTC in 6 months     Alyse Flores MD          Follow up in about 6 months (around 8/9/2023).

## 2023-02-10 ENCOUNTER — LAB VISIT (OUTPATIENT)
Dept: LAB | Facility: HOSPITAL | Age: 53
End: 2023-02-10
Attending: STUDENT IN AN ORGANIZED HEALTH CARE EDUCATION/TRAINING PROGRAM
Payer: MEDICARE

## 2023-02-10 DIAGNOSIS — I10 ESSENTIAL HYPERTENSION: ICD-10-CM

## 2023-02-10 DIAGNOSIS — Z79.899 OTHER LONG TERM (CURRENT) DRUG THERAPY: ICD-10-CM

## 2023-02-10 DIAGNOSIS — E55.9 VITAMIN D DEFICIENCY: ICD-10-CM

## 2023-02-10 DIAGNOSIS — E78.5 DYSLIPIDEMIA: ICD-10-CM

## 2023-02-10 LAB
25(OH)D3+25(OH)D2 SERPL-MCNC: 8 NG/ML (ref 30–96)
ALBUMIN SERPL BCP-MCNC: 3.8 G/DL (ref 3.5–5.2)
ALP SERPL-CCNC: 98 U/L (ref 55–135)
ALT SERPL W/O P-5'-P-CCNC: 9 U/L (ref 10–44)
ANION GAP SERPL CALC-SCNC: 14 MMOL/L (ref 8–16)
AST SERPL-CCNC: 19 U/L (ref 10–40)
BASOPHILS # BLD AUTO: 0.07 K/UL (ref 0–0.2)
BASOPHILS NFR BLD: 0.7 % (ref 0–1.9)
BILIRUB SERPL-MCNC: 0.2 MG/DL (ref 0.1–1)
BUN SERPL-MCNC: 11 MG/DL (ref 6–20)
CALCIUM SERPL-MCNC: 9.5 MG/DL (ref 8.7–10.5)
CHLORIDE SERPL-SCNC: 107 MMOL/L (ref 95–110)
CHOLEST SERPL-MCNC: 226 MG/DL (ref 120–199)
CHOLEST/HDLC SERPL: 5.7 {RATIO} (ref 2–5)
CO2 SERPL-SCNC: 22 MMOL/L (ref 23–29)
CREAT SERPL-MCNC: 0.8 MG/DL (ref 0.5–1.4)
DIFFERENTIAL METHOD: ABNORMAL
EOSINOPHIL # BLD AUTO: 0.3 K/UL (ref 0–0.5)
EOSINOPHIL NFR BLD: 2.7 % (ref 0–8)
ERYTHROCYTE [DISTWIDTH] IN BLOOD BY AUTOMATED COUNT: 13.2 % (ref 11.5–14.5)
EST. GFR  (NO RACE VARIABLE): >60 ML/MIN/1.73 M^2
ESTIMATED AVG GLUCOSE: 100 MG/DL (ref 68–131)
GLUCOSE SERPL-MCNC: 79 MG/DL (ref 70–110)
HBA1C MFR BLD: 5.1 % (ref 4–5.6)
HCT VFR BLD AUTO: 43.9 % (ref 37–48.5)
HDLC SERPL-MCNC: 40 MG/DL (ref 40–75)
HDLC SERPL: 17.7 % (ref 20–50)
HGB BLD-MCNC: 13.2 G/DL (ref 12–16)
IMM GRANULOCYTES # BLD AUTO: 0.01 K/UL (ref 0–0.04)
IMM GRANULOCYTES NFR BLD AUTO: 0.1 % (ref 0–0.5)
LDLC SERPL CALC-MCNC: 146.6 MG/DL (ref 63–159)
LYMPHOCYTES # BLD AUTO: 5.2 K/UL (ref 1–4.8)
LYMPHOCYTES NFR BLD: 50.5 % (ref 18–48)
MCH RBC QN AUTO: 28.9 PG (ref 27–31)
MCHC RBC AUTO-ENTMCNC: 30.1 G/DL (ref 32–36)
MCV RBC AUTO: 96 FL (ref 82–98)
MONOCYTES # BLD AUTO: 0.7 K/UL (ref 0.3–1)
MONOCYTES NFR BLD: 6.6 % (ref 4–15)
NEUTROPHILS # BLD AUTO: 4 K/UL (ref 1.8–7.7)
NEUTROPHILS NFR BLD: 39.4 % (ref 38–73)
NONHDLC SERPL-MCNC: 186 MG/DL
NRBC BLD-RTO: 0 /100 WBC
PLATELET # BLD AUTO: 382 K/UL (ref 150–450)
PMV BLD AUTO: 11.4 FL (ref 9.2–12.9)
POTASSIUM SERPL-SCNC: 4 MMOL/L (ref 3.5–5.1)
PROT SERPL-MCNC: 7 G/DL (ref 6–8.4)
RBC # BLD AUTO: 4.57 M/UL (ref 4–5.4)
SODIUM SERPL-SCNC: 143 MMOL/L (ref 136–145)
T4 FREE SERPL-MCNC: 0.94 NG/DL (ref 0.71–1.51)
TRIGL SERPL-MCNC: 197 MG/DL (ref 30–150)
TSH SERPL DL<=0.005 MIU/L-ACNC: 4.49 UIU/ML (ref 0.4–4)
WBC # BLD AUTO: 10.25 K/UL (ref 3.9–12.7)

## 2023-02-10 PROCEDURE — 83036 HEMOGLOBIN GLYCOSYLATED A1C: CPT | Performed by: STUDENT IN AN ORGANIZED HEALTH CARE EDUCATION/TRAINING PROGRAM

## 2023-02-10 PROCEDURE — 85025 COMPLETE CBC W/AUTO DIFF WBC: CPT | Performed by: STUDENT IN AN ORGANIZED HEALTH CARE EDUCATION/TRAINING PROGRAM

## 2023-02-10 PROCEDURE — 82306 VITAMIN D 25 HYDROXY: CPT | Performed by: STUDENT IN AN ORGANIZED HEALTH CARE EDUCATION/TRAINING PROGRAM

## 2023-02-10 PROCEDURE — 80061 LIPID PANEL: CPT | Performed by: STUDENT IN AN ORGANIZED HEALTH CARE EDUCATION/TRAINING PROGRAM

## 2023-02-10 PROCEDURE — 80053 COMPREHEN METABOLIC PANEL: CPT | Performed by: STUDENT IN AN ORGANIZED HEALTH CARE EDUCATION/TRAINING PROGRAM

## 2023-02-10 PROCEDURE — 84439 ASSAY OF FREE THYROXINE: CPT | Performed by: STUDENT IN AN ORGANIZED HEALTH CARE EDUCATION/TRAINING PROGRAM

## 2023-02-10 PROCEDURE — 84443 ASSAY THYROID STIM HORMONE: CPT | Performed by: STUDENT IN AN ORGANIZED HEALTH CARE EDUCATION/TRAINING PROGRAM

## 2023-02-10 PROCEDURE — 36415 COLL VENOUS BLD VENIPUNCTURE: CPT | Performed by: STUDENT IN AN ORGANIZED HEALTH CARE EDUCATION/TRAINING PROGRAM

## 2023-02-13 ENCOUNTER — CLINICAL SUPPORT (OUTPATIENT)
Dept: SMOKING CESSATION | Facility: CLINIC | Age: 53
End: 2023-02-13
Payer: COMMERCIAL

## 2023-02-13 DIAGNOSIS — F17.200 NICOTINE DEPENDENCE: Primary | ICD-10-CM

## 2023-02-13 PROCEDURE — 99402 PREV MED CNSL INDIV APPRX 30: CPT | Mod: S$GLB,,,

## 2023-02-13 PROCEDURE — 99402 PR PREVENT COUNSEL,INDIV,30 MIN: ICD-10-PCS | Mod: S$GLB,,,

## 2023-02-13 PROCEDURE — 99999 PR PBB SHADOW E&M-EST. PATIENT-LVL I: ICD-10-PCS | Mod: PBBFAC,,,

## 2023-02-13 PROCEDURE — 99999 PR PBB SHADOW E&M-EST. PATIENT-LVL I: CPT | Mod: PBBFAC,,,

## 2023-02-13 NOTE — PROGRESS NOTES
Individual Follow-Up Form #6    2/13/2023    Quit Date: TBD    Clinical Status of Patient: Outpatient    Length of Service: 30 minutes    Continuing Medication: yes  Chantix    Other Medications: none     Target Symptoms: Withdrawal and medication side effects. The following were  rated moderate (3) to severe (4) on TCRS:  Moderate (3): none  Severe (4): none    Comments:  Patient not able to come into clinic for follow up due to car trouble. Followed up via telephone call on her smoking cessation progress.  Pt continues to smoke a few cigarettes a day. Pt remains on 1 mg of Chantix QAM, not taking everyday. The patient denies any abnormal behavioral or mental changes at this time. Pt is to attempt another 24 hr quit challenge prior to next visit. Discussed and encouraged the 4 D's (delay, distraction, drink water, and deep breathing) when she has a craving to smoke. Pt will follow up in 2 weeks.       Diagnosis: F17.200    Next Visit: 2 weeks

## 2023-02-15 ENCOUNTER — PATIENT MESSAGE (OUTPATIENT)
Dept: PRIMARY CARE CLINIC | Facility: CLINIC | Age: 53
End: 2023-02-15
Payer: MEDICARE

## 2023-02-15 DIAGNOSIS — E55.9 VITAMIN D DEFICIENCY: Primary | ICD-10-CM

## 2023-02-15 DIAGNOSIS — E03.8 SUBCLINICAL HYPOTHYROIDISM: ICD-10-CM

## 2023-02-15 RX ORDER — ERGOCALCIFEROL 1.25 MG/1
50000 CAPSULE ORAL
Qty: 4 CAPSULE | Refills: 2 | Status: SHIPPED | OUTPATIENT
Start: 2023-02-15 | End: 2023-05-04

## 2023-02-28 ENCOUNTER — CLINICAL SUPPORT (OUTPATIENT)
Dept: SMOKING CESSATION | Facility: CLINIC | Age: 53
End: 2023-02-28
Payer: COMMERCIAL

## 2023-02-28 DIAGNOSIS — F17.200 NICOTINE DEPENDENCE: Primary | ICD-10-CM

## 2023-02-28 PROCEDURE — 99403 PR PREVENT COUNSEL,INDIV,45 MIN: ICD-10-PCS | Mod: S$GLB,,,

## 2023-02-28 PROCEDURE — 99403 PREV MED CNSL INDIV APPRX 45: CPT | Mod: S$GLB,,,

## 2023-03-09 ENCOUNTER — PATIENT OUTREACH (OUTPATIENT)
Dept: ADMINISTRATIVE | Facility: HOSPITAL | Age: 53
End: 2023-03-09
Payer: MEDICARE

## 2023-03-13 DIAGNOSIS — Z12.31 ENCOUNTER FOR SCREENING MAMMOGRAM FOR MALIGNANT NEOPLASM OF BREAST: Primary | ICD-10-CM

## 2023-03-14 ENCOUNTER — CLINICAL SUPPORT (OUTPATIENT)
Dept: SMOKING CESSATION | Facility: CLINIC | Age: 53
End: 2023-03-14
Payer: COMMERCIAL

## 2023-03-14 DIAGNOSIS — F17.200 NICOTINE DEPENDENCE: Primary | ICD-10-CM

## 2023-03-14 PROCEDURE — 99404 PR PREVENT COUNSEL,INDIV,60 MIN: ICD-10-PCS | Mod: S$GLB,,,

## 2023-03-14 PROCEDURE — 99404 PREV MED CNSL INDIV APPRX 60: CPT | Mod: S$GLB,,,

## 2023-03-14 PROCEDURE — 99999 PR PBB SHADOW E&M-EST. PATIENT-LVL I: CPT | Mod: PBBFAC,,,

## 2023-03-14 PROCEDURE — 99999 PR PBB SHADOW E&M-EST. PATIENT-LVL I: ICD-10-PCS | Mod: PBBFAC,,,

## 2023-03-14 RX ORDER — VARENICLINE TARTRATE 1 MG/1
1 TABLET, FILM COATED ORAL 2 TIMES DAILY
Qty: 60 TABLET | Refills: 0 | Status: SHIPPED | OUTPATIENT
Start: 2023-03-14 | End: 2023-05-23 | Stop reason: SDUPTHER

## 2023-03-14 NOTE — Clinical Note
Pt presents to smoking cessation clinic for follow up visit. Pt continues to smoke,2-3 cpd, was able to go up to 2 days, advised pt to push for 3 days. Pt remains on 1 mg of Chantix QAM. Will send refill. Pt is to remain on Chantix for 1 more month. Congratulated pt on her success thus far. Exhaled carbon monoxide level was 3 ppm per Smokerlyzer (0-6 non-smoker). The patient denies any abnormal behavioral or mental changes at this time. Pt will follow up in 2 weeks.

## 2023-03-14 NOTE — PROGRESS NOTES
Individual Follow-Up Form #8    3/14/2023    Quit Date: tbd    Clinical Status of Patient: Outpatient    Length of Service: 60 minutes    Continuing Medication: yes  Chantix or Nicotine gum    Other Medications: NONE     Target Symptoms: Withdrawal and medication side effects. The following were  rated moderate (3) to severe (4) on TCRS:  Moderate (3): NONE  Severe (4): NONE    Comments:  Pt presents to smoking cessation clinic for follow up visit. Pt continues to smoke,2-3 cpd, was able to go up to 2 days, advised pt to push for 3 days. Pt remains on 1 mg of Chantix QAM. Will send refill. Pt is to remain on Chantix for 1 more month. Congratulated pt on her success thus far. Exhaled carbon monoxide level was 3 ppm per Smokerlyzer (0-6 non-smoker). The patient denies any abnormal behavioral or mental changes at this time. Pt will follow up in 2 weeks.       Diagnosis: F17.200    Next Visit: 2 weeks

## 2023-03-28 ENCOUNTER — TELEPHONE (OUTPATIENT)
Dept: SMOKING CESSATION | Facility: CLINIC | Age: 53
End: 2023-03-28
Payer: MEDICARE

## 2023-03-28 NOTE — TELEPHONE ENCOUNTER
Called pt to reschedule her for next week for smoking cessation on 4/3/23. -Miroslava Draper, CTTS [phone].

## 2023-04-04 ENCOUNTER — CLINICAL SUPPORT (OUTPATIENT)
Dept: SMOKING CESSATION | Facility: CLINIC | Age: 53
End: 2023-04-04
Payer: COMMERCIAL

## 2023-04-04 DIAGNOSIS — F17.200 NICOTINE DEPENDENCE: Primary | ICD-10-CM

## 2023-04-04 PROCEDURE — 99999 PR PBB SHADOW E&M-EST. PATIENT-LVL I: CPT | Mod: PBBFAC,,,

## 2023-04-04 PROCEDURE — 99404 PREV MED CNSL INDIV APPRX 60: CPT | Mod: S$GLB,,,

## 2023-04-04 PROCEDURE — 99999 PR PBB SHADOW E&M-EST. PATIENT-LVL I: ICD-10-PCS | Mod: PBBFAC,,,

## 2023-04-04 PROCEDURE — 99404 PR PREVENT COUNSEL,INDIV,60 MIN: ICD-10-PCS | Mod: S$GLB,,,

## 2023-04-04 NOTE — PROGRESS NOTES
Individual Follow-Up Form #9    4/4/2023    Quit Date: TBD    Clinical Status of Patient: Outpatient    Length of Service: 60 minutes    Continuing Medication: yes  Chantix or Nicotine gum    Other Medications: none     Target Symptoms: Withdrawal and medication side effects. The following were  rated moderate (3) to severe (4) on TCRS:  Moderate (3): none  Severe (4): none    Comments: Pt presents to smoking cessation clinic for follow up visit. Pt continues to smoke,2-3 cpd, was able to go up to 3 days, advised pt to push for 4 days and to use Good Friday and Easter Sunday to remain tobacco free. Pt remains on 1 mg of Chantix QAM in conjunction with nicotine gum and regular gum. Pt is to remain on Chantix for 1 more month. Congratulated pt on her success thus far. Exhaled carbon monoxide level was  ppm per Smokerlyzer (0-6 non-smoker). The patient denies any abnormal behavioral or mental changes at this time. Pt will follow up in 2 weeks.       Diagnosis: F17.200    Next Visit: 2 weeks

## 2023-04-18 ENCOUNTER — CLINICAL SUPPORT (OUTPATIENT)
Dept: SMOKING CESSATION | Facility: CLINIC | Age: 53
End: 2023-04-18
Payer: COMMERCIAL

## 2023-04-18 DIAGNOSIS — F17.200 NICOTINE DEPENDENCE: Primary | ICD-10-CM

## 2023-04-18 PROCEDURE — 99404 PR PREVENT COUNSEL,INDIV,60 MIN: ICD-10-PCS | Mod: S$GLB,,,

## 2023-04-18 PROCEDURE — 99404 PREV MED CNSL INDIV APPRX 60: CPT | Mod: S$GLB,,,

## 2023-04-18 PROCEDURE — 99999 PR PBB SHADOW E&M-EST. PATIENT-LVL I: CPT | Mod: PBBFAC,,,

## 2023-04-18 PROCEDURE — 99999 PR PBB SHADOW E&M-EST. PATIENT-LVL I: ICD-10-PCS | Mod: PBBFAC,,,

## 2023-04-18 NOTE — PROGRESS NOTES
Individual Follow-Up Form #1    4/18/2023    Quit Date: TBD    Clinical Status of Patient: Outpatient    Length of Service: 60 minutes    Continuing Medication: yes  Chantix or Nicotine gum    Other Medications: none     Target Symptoms: Withdrawal and medication side effects. The following were  rated moderate (3) to severe (4) on TCRS:  Moderate (3): none  Severe (4): none    Comments:  Pt presents to smoking cessation clinic for follow up visit. Pt got off track over weekend while on a road trip to [place] and smoked a pack in 3 days, she reports she is back on track and has not smoked since Sunday night at 11 pm. Pt remains on 1 mg of Chantix QAM in conjunction with nicotine gum and regular gum. Pt is to remain on Chantix for 1 more month. Congratulated pt on her success thus far. Reviewed strategies, habitual behavior, high risks situations, understanding urges and cravings, stress and relaxation with open discussion and additional interventions, Introduced lapses, relapses, understanding them and analyzing the situation of a lapse, conflict issues that may be linked to a lapse. Exhaled carbon monoxide level was  2 ppm per Smokerlyzer (0-6 non-smoker). The patient denies any abnormal behavioral or mental changes at this time. Pt will follow up in 2 weeks.       Diagnosis: F17.200    Next Visit: 2 weeks

## 2023-04-18 NOTE — Clinical Note
Pt presents to smoking cessation clinic for follow up visit. Pt got off track over weekend while on a road trip to [place] and smoked a pack in 3 days, she reports she is back on track and has not smoked since Sunday night at 11 pm. Pt remains on 1 mg of Chantix QAM in conjunction with nicotine gum and regular gum. Pt is to remain on Chantix for 1 more month. Congratulated pt on her success thus far. Reviewed strategies, habitual behavior, high risks situations, understanding urges and cravings, stress and relaxation with open discussion and additional interventions, Introduced lapses, relapses, understanding them and analyzing the situation of a lapse, conflict issues that may be linked to a lapse. Exhaled carbon monoxide level was  2 ppm per Smokerlyzer (0-6 non-smoker). The patient denies any abnormal behavioral or mental changes at this time. Pt will follow up in 2 weeks.

## 2023-05-02 ENCOUNTER — CLINICAL SUPPORT (OUTPATIENT)
Dept: SMOKING CESSATION | Facility: CLINIC | Age: 53
End: 2023-05-02
Payer: COMMERCIAL

## 2023-05-02 ENCOUNTER — OFFICE VISIT (OUTPATIENT)
Dept: DERMATOLOGY | Facility: CLINIC | Age: 53
End: 2023-05-02
Payer: MEDICARE

## 2023-05-02 DIAGNOSIS — L82.1 SEBORRHEIC KERATOSES: ICD-10-CM

## 2023-05-02 DIAGNOSIS — F17.200 NICOTINE DEPENDENCE: Primary | ICD-10-CM

## 2023-05-02 DIAGNOSIS — D48.5 NEOPLASM OF UNCERTAIN BEHAVIOR OF SKIN: Primary | ICD-10-CM

## 2023-05-02 DIAGNOSIS — D22.9 ATYPICAL MOLE: ICD-10-CM

## 2023-05-02 PROCEDURE — 99404 PR PREVENT COUNSEL,INDIV,60 MIN: ICD-10-PCS | Mod: S$GLB,,,

## 2023-05-02 PROCEDURE — 99999 PR PBB SHADOW E&M-EST. PATIENT-LVL I: ICD-10-PCS | Mod: PBBFAC,,,

## 2023-05-02 PROCEDURE — 88304 TISSUE EXAM BY PATHOLOGIST: CPT | Performed by: DERMATOLOGY

## 2023-05-02 PROCEDURE — 3044F HG A1C LEVEL LT 7.0%: CPT | Mod: CPTII,S$GLB,, | Performed by: DERMATOLOGY

## 2023-05-02 PROCEDURE — 1160F RVW MEDS BY RX/DR IN RCRD: CPT | Mod: CPTII,S$GLB,, | Performed by: DERMATOLOGY

## 2023-05-02 PROCEDURE — 99214 OFFICE O/P EST MOD 30 MIN: CPT | Mod: 25,S$GLB,, | Performed by: DERMATOLOGY

## 2023-05-02 PROCEDURE — 99214 PR OFFICE/OUTPT VISIT, EST, LEVL IV, 30-39 MIN: ICD-10-PCS | Mod: 25,S$GLB,, | Performed by: DERMATOLOGY

## 2023-05-02 PROCEDURE — 11300 SHAVE SKIN LESION 0.5 CM/<: CPT | Mod: S$GLB,,, | Performed by: DERMATOLOGY

## 2023-05-02 PROCEDURE — 1159F PR MEDICATION LIST DOCUMENTED IN MEDICAL RECORD: ICD-10-PCS | Mod: CPTII,S$GLB,, | Performed by: DERMATOLOGY

## 2023-05-02 PROCEDURE — 3044F PR MOST RECENT HEMOGLOBIN A1C LEVEL <7.0%: ICD-10-PCS | Mod: CPTII,S$GLB,, | Performed by: DERMATOLOGY

## 2023-05-02 PROCEDURE — 88304 PR  SURG PATH,LEVEL III: ICD-10-PCS | Mod: 26,,, | Performed by: DERMATOLOGY

## 2023-05-02 PROCEDURE — 99999 PR PBB SHADOW E&M-EST. PATIENT-LVL III: ICD-10-PCS | Mod: PBBFAC,,, | Performed by: DERMATOLOGY

## 2023-05-02 PROCEDURE — 99999 PR PBB SHADOW E&M-EST. PATIENT-LVL I: CPT | Mod: PBBFAC,,,

## 2023-05-02 PROCEDURE — 11300 PR SHAV SKIN LES < 0.5 CM TRUNK,ARM,LEG: ICD-10-PCS | Mod: S$GLB,,, | Performed by: DERMATOLOGY

## 2023-05-02 PROCEDURE — 88304 TISSUE EXAM BY PATHOLOGIST: CPT | Mod: 26,,, | Performed by: DERMATOLOGY

## 2023-05-02 PROCEDURE — 99999 PR PBB SHADOW E&M-EST. PATIENT-LVL III: CPT | Mod: PBBFAC,,, | Performed by: DERMATOLOGY

## 2023-05-02 PROCEDURE — 1159F MED LIST DOCD IN RCRD: CPT | Mod: CPTII,S$GLB,, | Performed by: DERMATOLOGY

## 2023-05-02 PROCEDURE — 1160F PR REVIEW ALL MEDS BY PRESCRIBER/CLIN PHARMACIST DOCUMENTED: ICD-10-PCS | Mod: CPTII,S$GLB,, | Performed by: DERMATOLOGY

## 2023-05-02 PROCEDURE — 99404 PREV MED CNSL INDIV APPRX 60: CPT | Mod: S$GLB,,,

## 2023-05-02 NOTE — PROGRESS NOTES
Individual Follow-Up Form #2    5/2/2023    Quit Date: TBD    Clinical Status of Patient: Outpatient    Length of Service: 60 minutes    Continuing Medication: yes  Chantix or Nicotine gum    Other Medications: none     Target Symptoms: Withdrawal and medication side effects. The following were  rated moderate (3) to severe (4) on TCRS:  Moderate (3): none  Severe (4): none    Comments:  Pt presents to smoking cessation clinic for follow up visit. Pt continues to smoke. Pt remains on 1 mg of Chantix QAM in conjunction with nicotine gum and regular gum. Pt is to remain on Chantix for 1 more month. Congratulated pt on her success thus far. Reviewed strategies, habitual behavior, high risks situations, understanding urges and cravings, stress and relaxation with open discussion and additional interventions, Introduced lapses, relapses, understanding them and analyzing the situation of a lapse, conflict issues that may be linked to a lapse. Exhaled carbon monoxide level was  3 ppm per Smokerlyzer (0-6 non-smoker). The patient denies any abnormal behavioral or mental changes at this time. Pt will follow up in 2 weeks.    Diagnosis: F17.200    Next Visit: 2 weeks

## 2023-05-02 NOTE — PROGRESS NOTES
Subjective:      Patient ID:  Darwin Beltrán is a 52 y.o. female who presents for   Chief Complaint   Patient presents with    Spot     Left hip     Lesion on left hip which is irritated.  Also spot on right buttock for over a year no change.       Review of Systems   Skin:  Negative for daily sunscreen use, activity-related sunscreen use and recent sunburn.   Hematologic/Lymphatic: Does not bruise/bleed easily.     Objective:   Physical Exam   Constitutional: She appears well-developed and well-nourished. No distress.   Neurological: She is alert and oriented to person, place, and time. She is not disoriented.   Psychiatric: She has a normal mood and affect.   Skin:   Areas Examined (abnormalities noted in diagram):   Genitals / Buttocks / Groin Inspection Performed               Diagram Legend     Erythematous scaling macule/papule c/w actinic keratosis       Vascular papule c/w angioma      Pigmented verrucoid papule/plaque c/w seborrheic keratosis      Yellow umbilicated papule c/w sebaceous hyperplasia      Irregularly shaped tan macule c/w lentigo     1-2 mm smooth white papules consistent with Milia      Movable subcutaneous cyst with punctum c/w epidermal inclusion cyst      Subcutaneous movable cyst c/w pilar cyst      Firm pink to brown papule c/w dermatofibroma      Pedunculated fleshy papule(s) c/w skin tag(s)      Evenly pigmented macule c/w junctional nevus     Mildly variegated pigmented, slightly irregular-bordered macule c/w mildly atypical nevus      Flesh colored to evenly pigmented papule c/w intradermal nevus       Pink pearly papule/plaque c/w basal cell carcinoma      Erythematous hyperkeratotic cursted plaque c/w SCC      Surgical scar with no sign of skin cancer recurrence      Open and closed comedones      Inflammatory papules and pustules      Verrucoid papule consistent consistent with wart     Erythematous eczematous patches and plaques     Dystrophic onycholytic nail with  subungual debris c/w onychomycosis     Umbilicated papule    Erythematous-base heme-crusted tan verrucoid plaque consistent with inflamed seborrheic keratosis     Erythematous Silvery Scaling Plaque c/w Psoriasis     See annotation      Assessment / Plan:      Pathology Orders:       Normal Orders This Visit    Specimen to Pathology, Dermatology     Questions:    Procedure Type: Dermatology and skin neoplasms    Number of Specimens: 1    ------------------------: -------------------------    Spec 1 Procedure: Biopsy    Spec 1 Clinical Impression: oxidized cyst    Spec 1 Source: left hip    Release to patient:           Neoplasm of uncertain behavior of skin  -     Specimen to Pathology, Dermatology  Shave removal procedure note:    Shave removal performed after verbal consent including risk of infection, scar, recurrence, need for additional treatment of site. Area prepped with alcohol, anesthetized 1% lidocaine with epinephrine. Lesional tissue shaved. Lesion defect size 5mm No complications. Dressing applied. Wound care explained.          Atypical mole  -     Ambulatory referral/consult to Dermatology    Seborrheic keratoses  Seborrheic keratosis scattered, told benign no treatment needed.  Brochure provided.               Follow up in about 1 year (around 5/2/2024).

## 2023-05-09 LAB
FINAL PATHOLOGIC DIAGNOSIS: NORMAL
GROSS: NORMAL
Lab: NORMAL
MICROSCOPIC EXAM: NORMAL

## 2023-05-12 ENCOUNTER — PATIENT MESSAGE (OUTPATIENT)
Dept: DERMATOLOGY | Facility: CLINIC | Age: 53
End: 2023-05-12
Payer: MEDICARE

## 2023-05-23 ENCOUNTER — CLINICAL SUPPORT (OUTPATIENT)
Dept: SMOKING CESSATION | Facility: CLINIC | Age: 53
End: 2023-05-23
Payer: COMMERCIAL

## 2023-05-23 DIAGNOSIS — F17.200 NICOTINE DEPENDENCE: Primary | ICD-10-CM

## 2023-05-23 PROCEDURE — 99402 PR PREVENT COUNSEL,INDIV,30 MIN: ICD-10-PCS | Mod: S$GLB,,,

## 2023-05-23 PROCEDURE — 99999 PR PBB SHADOW E&M-EST. PATIENT-LVL I: CPT | Mod: PBBFAC,,,

## 2023-05-23 PROCEDURE — 99402 PREV MED CNSL INDIV APPRX 30: CPT | Mod: S$GLB,,,

## 2023-05-23 PROCEDURE — 99999 PR PBB SHADOW E&M-EST. PATIENT-LVL I: ICD-10-PCS | Mod: PBBFAC,,,

## 2023-05-23 RX ORDER — VARENICLINE TARTRATE 1 MG/1
1 TABLET, FILM COATED ORAL 2 TIMES DAILY
Qty: 60 TABLET | Refills: 0 | Status: SHIPPED | OUTPATIENT
Start: 2023-05-23 | End: 2023-10-25

## 2023-05-23 NOTE — Clinical Note
Pt presents to smoking cessation follow up via telephone call. Pt continues to smoke 1 cigarette here and there, not buying any but is bumming from others smokers.  Pt remains on 1 mg of Chantix QAM in conjunction with nicotine gum and regular gum. Pt is to remain on Chantix for another month. Will send refill for Chantix. Congratulated pt on her success thus far. Reviewed strategies, habitual behavior, high risks situations, understanding urges and cravings, stress and relaxation with open discussion and additional interventions, Introduced lapses, relapses, understanding them and analyzing the situation of a lapse, conflict issues that may be linked to a lapse.The patient denies any abnormal behavioral or mental changes at this time. Pt will follow up in next week.

## 2023-05-23 NOTE — PROGRESS NOTES
Individual Follow-Up Form #3    5/23/2023    Quit Date: TBD    Clinical Status of Patient: Outpatient    Length of Service: 30 minutes    Continuing Medication: yes  Chantix    Other Medications: none     Target Symptoms: Withdrawal and medication side effects. The following were  rated moderate (3) to severe (4) on TCRS:  Moderate (3): none  Severe (4): none    Comments:  Pt presents to smoking cessation follow up via telephone call. Pt continues to smoke 1 cigarette here and there, not buying any but is bumming from others smokers.  Pt remains on 1 mg of Chantix QAM in conjunction with nicotine gum and regular gum. Pt is to remain on Chantix for another month. Will send refill for Chantix. Congratulated pt on her success thus far. Reviewed strategies, habitual behavior, high risks situations, understanding urges and cravings, stress and relaxation with open discussion and additional interventions, Introduced lapses, relapses, understanding them and analyzing the situation of a lapse, conflict issues that may be linked to a lapse.The patient denies any abnormal behavioral or mental changes at this time. Pt will follow up in next week.    Diagnosis: F17.200    Next Visit: 1 week

## 2023-05-30 ENCOUNTER — CLINICAL SUPPORT (OUTPATIENT)
Dept: SMOKING CESSATION | Facility: CLINIC | Age: 53
End: 2023-05-30
Payer: COMMERCIAL

## 2023-05-30 DIAGNOSIS — F17.200 NICOTINE DEPENDENCE: Primary | ICD-10-CM

## 2023-05-30 PROCEDURE — 99999 PR PBB SHADOW E&M-EST. PATIENT-LVL I: CPT | Mod: PBBFAC,,,

## 2023-05-30 PROCEDURE — 99404 PR PREVENT COUNSEL,INDIV,60 MIN: ICD-10-PCS | Mod: S$GLB,,,

## 2023-05-30 PROCEDURE — 99999 PR PBB SHADOW E&M-EST. PATIENT-LVL I: ICD-10-PCS | Mod: PBBFAC,,,

## 2023-05-30 PROCEDURE — 99404 PREV MED CNSL INDIV APPRX 60: CPT | Mod: S$GLB,,,

## 2023-05-30 NOTE — PROGRESS NOTES
Individual Follow-Up Form #4    5/30/2023    Quit Date: tbd    Clinical Status of Patient: Outpatient    Length of Service: 60 minutes    Continuing Medication: yes  Chantix    Other Medications: none     Target Symptoms: Withdrawal and medication side effects. The following were  rated moderate (3) to severe (4) on TCRS:  Moderate (3): none  Severe (4): none    Comments:  Pt presents to smoking cessation follow up. Pt has not been smoking, since Saturday. Pt remains on 1 mg of Chantix QAM in conjunction with nicotine gum and regular gum. Pt is to remain on Chantix for another month. Pt has been going up to 4 days without smoking, her goal is to go a week without smoking. Congratulated pt on her success thus far. Reviewed strategies, habitual behavior, high risks situations, understanding urges and cravings, stress and relaxation with open discussion and additional interventions, Introduced lapses, relapses, understanding them and analyzing the situation of a lapse, conflict issues that may be linked to a lapse.The patient denies any abnormal behavioral or mental changes at this time. Exhaled carbon monoxide level was 2 ppm per Smokerlyzer (0-6 non-smoker). Pt will follow up in 2 weeks.       Diagnosis: F17.200    Next Visit: 2 weeks

## 2023-06-13 ENCOUNTER — CLINICAL SUPPORT (OUTPATIENT)
Dept: SMOKING CESSATION | Facility: CLINIC | Age: 53
End: 2023-06-13
Payer: COMMERCIAL

## 2023-06-13 DIAGNOSIS — F17.200 NICOTINE DEPENDENCE: Primary | ICD-10-CM

## 2023-06-13 PROCEDURE — 99999 PR PBB SHADOW E&M-EST. PATIENT-LVL I: CPT | Mod: PBBFAC,,,

## 2023-06-13 PROCEDURE — 99999 PR PBB SHADOW E&M-EST. PATIENT-LVL I: ICD-10-PCS | Mod: PBBFAC,,,

## 2023-06-13 PROCEDURE — 99404 PREV MED CNSL INDIV APPRX 60: CPT | Mod: S$GLB,,,

## 2023-06-13 PROCEDURE — 99404 PR PREVENT COUNSEL,INDIV,60 MIN: ICD-10-PCS | Mod: S$GLB,,,

## 2023-06-13 NOTE — PROGRESS NOTES
Individual Follow-Up Form #5    6/13/2023    Quit Date: TBD    Clinical Status of Patient: Outpatient    Length of Service: 60 minutes    Continuing Medication: yes  Chantix or Nicotine gum    Other Medications: none     Target Symptoms: Withdrawal and medication side effects. The following were  rated moderate (3) to severe (4) on TCRS:  Moderate (3): none  Severe (4): none    Comments: Pt presents to smoking cessation follow up. Pt has not been smoking, since Saturday. Pt remains on 1 mg of Chantix QAM in conjunction with nicotine gum and regular gum. Pt is to remain on Chantix for another month. Pt has been going up to 1 week  without smoking, her goal is to go longer than a week without smoking. Congratulated pt on her success thus far. Reviewed strategies, habitual behavior, high risks situations, understanding urges and cravings, stress and relaxation with open discussion and additional interventions, Introduced lapses, relapses, understanding them and analyzing the situation of a lapse, conflict issues that may be linked to a lapse.The patient denies any abnormal behavioral or mental changes at this time. Exhaled carbon monoxide level was 4 ppm per Smokerlyzer (0-6 non-smoker). Pt will follow up in 2 weeks.          Diagnosis: F17.200    Next Visit: 2 weeks

## 2023-07-18 ENCOUNTER — CLINICAL SUPPORT (OUTPATIENT)
Dept: SMOKING CESSATION | Facility: CLINIC | Age: 53
End: 2023-07-18
Payer: COMMERCIAL

## 2023-07-18 DIAGNOSIS — F17.200 NICOTINE DEPENDENCE: Primary | ICD-10-CM

## 2023-07-18 PROCEDURE — 99999 PR PBB SHADOW E&M-EST. PATIENT-LVL I: ICD-10-PCS | Mod: PBBFAC,,,

## 2023-07-18 PROCEDURE — 99999 PR PBB SHADOW E&M-EST. PATIENT-LVL I: CPT | Mod: PBBFAC,,,

## 2023-07-18 PROCEDURE — 99404 PREV MED CNSL INDIV APPRX 60: CPT | Mod: S$GLB,,,

## 2023-07-18 PROCEDURE — 99404 PR PREVENT COUNSEL,INDIV,60 MIN: ICD-10-PCS | Mod: S$GLB,,,

## 2023-07-18 NOTE — PROGRESS NOTES
Individual Follow-Up Form #6    7/18/2023    Quit Date: 5/2023    Clinical Status of Patient: Outpatient    Length of Service: 60 minutes    Continuing Medication: no    Other Medications: none     Target Symptoms: Withdrawal and medication side effects. The following were  rated moderate (3) to severe (4) on TCRS:  Moderate (3): none  Severe (4): none    Comments:  Pt presents to smoking cessation clinic for follow up. Pt reports she has not been smoking the last 2 weeks. She is no longer buying any and might bum 1 from a friend every now and then. Advised pt no to bum at all, that will not break the habit and may cause her to relapse. Pt is no longer on any smoking cessation medications. Congratulated pt on her success thus far. Pt is excited to remain tobacco free since she will be a grandma this October.  Reviewed strategies, habitual behavior, high risks situations, understanding urges and cravings, stress and relaxation with open discussion and additional interventions, Introduced lapses, relapses, understanding them and analyzing the situation of a lapse, conflict issues that may be linked to a lapse.The patient denies any abnormal behavioral or mental changes at this time. Exhaled carbon monoxide level was 1 ppm per Smokerlyzer (0-6 non-smoker). Pt will follow up in 2 weeks.       Diagnosis: F17.200    Next Visit: 2 weeks

## 2023-07-26 ENCOUNTER — TELEPHONE (OUTPATIENT)
Dept: PRIMARY CARE CLINIC | Facility: CLINIC | Age: 53
End: 2023-07-26
Payer: MEDICARE

## 2023-07-26 NOTE — TELEPHONE ENCOUNTER
----- Message from Kortney Sanchez sent at 7/26/2023 11:52 AM CDT -----  Type:  Patient Requesting Referral    Who Called:pt  Does the patient already have the specialty appointment scheduled?:no  If yes, what is the date of that appointment?:n/a  Referral to What Specialty:mammogram  Reason for Referral:overdue  Does the patient want the referral with a specific physician?:[facility]  [facility]  Patient Requesting a Response?:yes  Would the patient rather a call back or a response via [facility]? call  Best Call Back Number:[phone]  Additional Information:

## 2023-07-27 DIAGNOSIS — Z12.31 ENCOUNTER FOR SCREENING MAMMOGRAM FOR MALIGNANT NEOPLASM OF BREAST: Primary | ICD-10-CM

## 2023-08-01 ENCOUNTER — CLINICAL SUPPORT (OUTPATIENT)
Dept: SMOKING CESSATION | Facility: CLINIC | Age: 53
End: 2023-08-01
Payer: COMMERCIAL

## 2023-08-01 DIAGNOSIS — F17.200 NICOTINE DEPENDENCE: Primary | ICD-10-CM

## 2023-08-01 PROCEDURE — 99999 PR PBB SHADOW E&M-EST. PATIENT-LVL I: CPT | Mod: PBBFAC,,,

## 2023-08-01 PROCEDURE — 99999 PR PBB SHADOW E&M-EST. PATIENT-LVL I: ICD-10-PCS | Mod: PBBFAC,,,

## 2023-08-01 PROCEDURE — 99404 PR PREVENT COUNSEL,INDIV,60 MIN: ICD-10-PCS | Mod: S$GLB,,,

## 2023-08-01 PROCEDURE — 99404 PREV MED CNSL INDIV APPRX 60: CPT | Mod: S$GLB,,,

## 2023-08-01 NOTE — PROGRESS NOTES
Individual Follow-Up Form #7    8/1/2023    Quit Date: 5/2023    Clinical Status of Patient: Outpatient    Length of Service: 60 minutes    Continuing Medication: no    Other Medications: none     Target Symptoms: Withdrawal and medication side effects. The following were  rated moderate (3) to severe (4) on TCRS:  Moderate (3): none  Severe (4): none    Comments: Pt presents to smoking cessation clinic for follow up. Pt reports she has not had any slips in the last month. She is no longer buying any and has not bummed at all.She is no longer on any smoking cessation medications. Congratulated pt on her success thus far. Pt is excited to remain tobacco free since she will be a grandma this October. She has been staying busy cleaning and preparing for the arrival of her grandchild. She has no cravings or thoughts of smoking. Reviewed strategies, habitual behavior, high risks situations, understanding urges and cravings, stress and relaxation with open discussion and additional interventions, Introduced lapses, relapses, understanding them and analyzing the situation of a lapse, conflict issues that may be linked to a lapse.The patient denies any abnormal behavioral or mental changes at this time. Exhaled carbon monoxide level was 1 ppm per Smokerlyzer (0-6 non-smoker). Pt will follow up in 2 weeks for last session prior to benefits ending.       Diagnosis: F17.200    Next Visit: 2 weeks

## 2023-08-03 ENCOUNTER — HOSPITAL ENCOUNTER (OUTPATIENT)
Dept: RADIOLOGY | Facility: HOSPITAL | Age: 53
Discharge: HOME | End: 2023-08-03
Attending: STUDENT IN AN ORGANIZED HEALTH CARE EDUCATION/TRAINING PROGRAM
Payer: MEDICARE

## 2023-08-03 DIAGNOSIS — Z12.31 ENCOUNTER FOR SCREENING MAMMOGRAM FOR MALIGNANT NEOPLASM OF BREAST: ICD-10-CM

## 2023-08-03 PROCEDURE — 77063 BREAST TOMOSYNTHESIS BI: CPT | Mod: 26,,, | Performed by: RADIOLOGY

## 2023-08-03 PROCEDURE — 77067 MAMMO DIGITAL SCREENING BILAT WITH TOMO: ICD-10-PCS | Mod: 26,,, | Performed by: RADIOLOGY

## 2023-08-03 PROCEDURE — 77063 MAMMO DIGITAL SCREENING BILAT WITH TOMO: ICD-10-PCS | Mod: 26,,, | Performed by: RADIOLOGY

## 2023-08-03 PROCEDURE — 77067 SCR MAMMO BI INCL CAD: CPT | Mod: TC

## 2023-08-03 PROCEDURE — 77067 SCR MAMMO BI INCL CAD: CPT | Mod: 26,,, | Performed by: RADIOLOGY

## 2023-08-07 ENCOUNTER — TELEPHONE (OUTPATIENT)
Dept: SMOKING CESSATION | Facility: CLINIC | Age: 53
End: 2023-08-07
Payer: MEDICARE

## 2023-08-15 ENCOUNTER — CLINICAL SUPPORT (OUTPATIENT)
Dept: SMOKING CESSATION | Facility: CLINIC | Age: 53
End: 2023-08-15
Payer: COMMERCIAL

## 2023-08-15 DIAGNOSIS — F17.200 NICOTINE DEPENDENCE: Primary | ICD-10-CM

## 2023-08-15 PROCEDURE — 99404 PR PREVENT COUNSEL,INDIV,60 MIN: ICD-10-PCS | Mod: S$GLB,,,

## 2023-08-15 PROCEDURE — 99404 PREV MED CNSL INDIV APPRX 60: CPT | Mod: S$GLB,,,

## 2023-08-15 PROCEDURE — 99999 PR PBB SHADOW E&M-EST. PATIENT-LVL I: ICD-10-PCS | Mod: PBBFAC,,,

## 2023-08-15 PROCEDURE — 99999 PR PBB SHADOW E&M-EST. PATIENT-LVL I: CPT | Mod: PBBFAC,,,

## 2023-08-15 NOTE — Clinical Note
Pt presents to smoking cessation clinic for last follow up visit. Pt continues to remain tobacco free. She is no longer buying any and has not bummed at all.She is no longer on any smoking cessation medications. Congratulated pt on her success thus far. Pt is excited to remain tobacco free since she will be a grandma this October. She has been staying busy preparing for the arrival of her grandchild. She has no cravings or thoughts of smoking. Reviewed strategies, cues, triggers, high risk situations, lapses, relapses, diet, exercise, stress, relaxation, sleep, habitual behavior, and life style changes.The patient denies any abnormal behavioral or mental changes at this time. Exhaled carbon monoxide level was 2 ppm per Smokerlyzer (0-6 non-smoker). Pt has completed program and is aware of case management phone follow ups. Advised pt to call me at anytime if she was to need further help or support remaining tobacco free.

## 2023-08-16 NOTE — PROGRESS NOTES
Individual Follow-Up Form #8    8/16/2023    Quit Date: 5/2023    Clinical Status of Patient: Outpatient    Length of Service: 60 minutes    Continuing Medication: no    Other Medications: none     Target Symptoms: Withdrawal and medication side effects. The following were  rated moderate (3) to severe (4) on TCRS:  Moderate (3): none  Severe (4): none    Comments: Pt presents to smoking cessation clinic for last follow up visit. Pt continues to remain tobacco free. She is no longer buying any and has not bummed at all.She is no longer on any smoking cessation medications. Congratulated pt on her success thus far. Pt is excited to remain tobacco free since she will be a grandma this October. She has been staying busy preparing for the arrival of her grandchild. She has no cravings or thoughts of smoking. Reviewed strategies, cues, triggers, high risk situations, lapses, relapses, diet, exercise, stress, relaxation, sleep, habitual behavior, and life style changes.The patient denies any abnormal behavioral or mental changes at this time. Exhaled carbon monoxide level was 2 ppm per Smokerlyzer (0-6 non-smoker). Pt has completed program and is aware of case management phone follow ups. Advised pt to call me at anytime if she was to need further help or support remaining tobacco free.     Diagnosis: F17.200    Next Visit: Has completed program.

## 2023-09-16 ENCOUNTER — HOSPITAL ENCOUNTER (EMERGENCY)
Facility: HOSPITAL | Age: 53
Discharge: HOME | End: 2023-09-16
Attending: EMERGENCY MEDICINE
Payer: MEDICARE

## 2023-09-16 VITALS
WEIGHT: 180 LBS | BODY MASS INDEX: 31.89 KG/M2 | HEART RATE: 80 BPM | SYSTOLIC BLOOD PRESSURE: 137 MMHG | OXYGEN SATURATION: 100 % | TEMPERATURE: 98 F | RESPIRATION RATE: 41 BRPM | DIASTOLIC BLOOD PRESSURE: 69 MMHG

## 2023-09-16 DIAGNOSIS — R55 SYNCOPE: Primary | ICD-10-CM

## 2023-09-16 LAB
ALBUMIN SERPL BCP-MCNC: 4.1 G/DL (ref 3.5–5.2)
ALP SERPL-CCNC: 92 U/L (ref 55–135)
ALT SERPL W/O P-5'-P-CCNC: 14 U/L (ref 10–44)
ANION GAP SERPL CALC-SCNC: 12 MMOL/L (ref 8–16)
AST SERPL-CCNC: 14 U/L (ref 10–40)
BASOPHILS # BLD AUTO: 0.07 K/UL (ref 0–0.2)
BASOPHILS NFR BLD: 0.8 % (ref 0–1.9)
BILIRUB SERPL-MCNC: 0.3 MG/DL (ref 0.1–1)
BUN SERPL-MCNC: 9 MG/DL (ref 6–20)
CALCIUM SERPL-MCNC: 9.3 MG/DL (ref 8.7–10.5)
CHLORIDE SERPL-SCNC: 110 MMOL/L (ref 95–110)
CO2 SERPL-SCNC: 20 MMOL/L (ref 23–29)
CREAT SERPL-MCNC: 0.8 MG/DL (ref 0.5–1.4)
DIFFERENTIAL METHOD: NORMAL
EOSINOPHIL # BLD AUTO: 0.3 K/UL (ref 0–0.5)
EOSINOPHIL NFR BLD: 3 % (ref 0–8)
ERYTHROCYTE [DISTWIDTH] IN BLOOD BY AUTOMATED COUNT: 13.2 % (ref 11.5–14.5)
EST. GFR  (NO RACE VARIABLE): >60 ML/MIN/1.73 M^2
GLUCOSE SERPL-MCNC: 84 MG/DL (ref 70–110)
HCT VFR BLD AUTO: 41.3 % (ref 37–48.5)
HGB BLD-MCNC: 13.3 G/DL (ref 12–16)
IMM GRANULOCYTES # BLD AUTO: 0.03 K/UL (ref 0–0.04)
IMM GRANULOCYTES NFR BLD AUTO: 0.3 % (ref 0–0.5)
LYMPHOCYTES # BLD AUTO: 3.5 K/UL (ref 1–4.8)
LYMPHOCYTES NFR BLD: 37.5 % (ref 18–48)
MCH RBC QN AUTO: 28.7 PG (ref 27–31)
MCHC RBC AUTO-ENTMCNC: 32.2 G/DL (ref 32–36)
MCV RBC AUTO: 89 FL (ref 82–98)
MONOCYTES # BLD AUTO: 0.6 K/UL (ref 0.3–1)
MONOCYTES NFR BLD: 6.2 % (ref 4–15)
NEUTROPHILS # BLD AUTO: 4.8 K/UL (ref 1.8–7.7)
NEUTROPHILS NFR BLD: 52.2 % (ref 38–73)
NRBC BLD-RTO: 0 /100 WBC
PLATELET # BLD AUTO: 233 K/UL (ref 150–450)
PMV BLD AUTO: 11 FL (ref 9.2–12.9)
POTASSIUM SERPL-SCNC: 3.6 MMOL/L (ref 3.5–5.1)
PROT SERPL-MCNC: 7 G/DL (ref 6–8.4)
RBC # BLD AUTO: 4.64 M/UL (ref 4–5.4)
SODIUM SERPL-SCNC: 142 MMOL/L (ref 136–145)
TROPONIN I SERPL DL<=0.01 NG/ML-MCNC: <0.006 NG/ML (ref 0–0.03)
TROPONIN I SERPL DL<=0.01 NG/ML-MCNC: <0.006 NG/ML (ref 0–0.03)
WBC # BLD AUTO: 9.2 K/UL (ref 3.9–12.7)

## 2023-09-16 PROCEDURE — 80053 COMPREHEN METABOLIC PANEL: CPT | Performed by: EMERGENCY MEDICINE

## 2023-09-16 PROCEDURE — 84484 ASSAY OF TROPONIN QUANT: CPT | Mod: 91 | Performed by: EMERGENCY MEDICINE

## 2023-09-16 PROCEDURE — 93005 ELECTROCARDIOGRAM TRACING: CPT

## 2023-09-16 PROCEDURE — 85025 COMPLETE CBC W/AUTO DIFF WBC: CPT | Performed by: EMERGENCY MEDICINE

## 2023-09-16 PROCEDURE — 99285 EMERGENCY DEPT VISIT HI MDM: CPT | Mod: 25

## 2023-09-16 PROCEDURE — 93010 EKG 12-LEAD: ICD-10-PCS | Mod: ,,, | Performed by: INTERNAL MEDICINE

## 2023-09-16 PROCEDURE — 93010 ELECTROCARDIOGRAM REPORT: CPT | Mod: ,,, | Performed by: INTERNAL MEDICINE

## 2023-09-16 PROCEDURE — 25000003 PHARM REV CODE 250: Performed by: EMERGENCY MEDICINE

## 2023-09-16 RX ORDER — ACETAMINOPHEN 500 MG
1000 TABLET ORAL
Status: COMPLETED | OUTPATIENT
Start: 2023-09-16 | End: 2023-09-16

## 2023-09-16 RX ADMIN — ACETAMINOPHEN 1000 MG: 500 TABLET ORAL at 07:09

## 2023-09-16 NOTE — ED PROVIDER NOTES
Emergency Department Encounter  Provider Note  Encounter Date: 2023    Patient Name: Darwin Beltrán  MRN: [id number]    History of Present Illness   HPI  History of Present Illness:    Chief Complaint:   Chief Complaint   Patient presents with    Seizures     Pt presents to ED today via [facility] from [facility] witnessed seizure denies head trauma  Hx of seizures denies taking medication at this time   Pt arrives AAOx4          53-year-old female presenting with seizure versus syncope at a restaurant.  Patient insists that she felt weak and fell down and her arm was trembling which made it seem like she was having a seizure.  She states that she had no LOC, but was witnessed to have seizure-like activity and possible LOC from witnesses.  Unclear what the real story is.  Patient states that she feels fine, and likely fell over because she needed to eat.  No history of diabetes.  States that she has a little bit of a headache but no other symptoms.    The following PMH/PSH/SocHx/FamHx has been reviewed by myself:    Past Medical History:   Diagnosis Date    Chronic migraine 2021    Elevated blood pressure reading in office without diagnosis of hypertension 2020    HTN (hypertension)     [place]     Stroke      Past Surgical History:   Procedure Laterality Date    BILATERAL TUBAL LIGATION  2009    COLONOSCOPY N/A 2021    Procedure: COLONOSCOPY/SUPREP;  Surgeon: Jose Williamson MD;  Location: [facility];  Service: Endoscopy;  Laterality: N/A;  covid test   [place]    ORIF TIBIA & FIBULA FRACTURES Right 2004    Hardware in Place     Social History     Tobacco Use    Smoking status: Former     Current packs/day: 0.00     Average packs/day: 0.5 packs/day for 23.0 years (11.5 ttl pk-yrs)     Types: Cigarettes     Quit date: 2023     Years since quittin.3     Passive exposure: Never    Smokeless tobacco: Never   Substance Use Topics    Alcohol use: Yes     Comment: occasionally (2 drinks max  per occasion)    Drug use: Never     Family History   Problem Relation Age of Onset    Breast cancer Mother 57         in 60s    Prostate cancer Father     Hypertension Father     Breast cancer Paternal Aunt     Breast cancer Paternal Grandmother     Lung cancer Brother     Colon cancer Brother        Allergies reviewed:  Review of patient's allergies indicates:  No Known Allergies    Medications reviewed:  Medication List with Changes/Refills   Current Medications    AMLODIPINE (NORVASC) 10 MG TABLET    TAKE 1 TABLET BY MOUTH EVERY DAY    ASPIRIN (ECOTRIN) 81 MG EC TABLET    Take 81 mg by mouth once daily.    ERENUMAB-AOOE (AIMOVIG AUTOINJECTOR) 140 MG/ML AUTOINJECTOR    Inject 1 mL (140 mg total) into the skin every 28 days.    HYDROCHLOROTHIAZIDE (HYDRODIURIL) 25 MG TABLET    Take 1 tablet (25 mg total) by mouth once daily.    MV-MN/IRON/FOLIC ACID/HERB 190 (VITAMIN D3 COMPLETE ORAL)    Take 1 tablet by mouth once daily.    NICOTINE (NICODERM CQ) 14 MG/24 HR    Place 1 patch onto the skin once daily.    NICOTINE (NICODERM CQ) 21 MG/24 HR    Place 1 patch onto the skin once daily.    NICOTINE POLACRILEX (NICORETTE) 4 MG GUM    Take 1 each (4 mg total) by mouth as needed (1-2 pieces/hr in place of a cigarette. Maximum of 10 pieces/day.).    VARENICLINE (CHANTIX) 1 MG TAB    Take 1 tablet (1 mg total) by mouth 2 (two) times daily. /MULLINS       ROS  Review of Systems:    Constitutional:  Negative for fever.   HENT:  Negative for sore throat.    Eyes:  Negative for redness.   Respiratory:  Negative for shortness of breath.    Cardiovascular:  Negative for chest pain.   Gastrointestinal:  Negative for nausea.   Genitourinary:  Negative for dysuria.   Musculoskeletal:  Negative for back pain.   Skin:  Negative for rash.   Neurological:  Negative for weakness.   Hematological:  Does not bruise/bleed easily.   Psychiatric/Behavioral:  The patient is not nervous/anxious.      Physical Exam   Physical Exam    Initial  Vitals   BP Pulse Resp Temp SpO2   09/16/23 1539 09/16/23 1539 09/16/23 1539 09/16/23 1902 09/16/23 1539   (!) 160/88 98 18 98.2 °F (36.8 °C) 99 %      MAP       --                  Triage vital signs reviewed.    Constitutional: Well-nourished, well-developed. Not in acute distress.  HENT: Normocephalic, atraumatic. Moist mucous membranes.  Eyes: No conjunctival injection.  Resp: Normal respiratory effort, breathing unlabored.  Cardio: Regular rate and rhythm.  GI: Abdomen non-distended.   MSK: Extremities without any deformities noted. No lower extremity edema.  Skin: Warm and dry. No rashes or lesions noted.  Neuro: Awake and alert. Moves all extremities.  Cranial nerves 2-12 grossly intact    ED Course   Procedures    Medical Decision Making    History Acquisition     The history is provided by the patient.   Assessment requiring an independent historian and why historian was needed:  EMS giving their version of the story from witnesses/report    Review of prior external/non ED notes: neurology  notes, hx moyamoya, migraines    Differential Diagnoses   Based on available information and initial assessment, the working Differential Diagnosis includes, but is not limited to:  Arrhythmia, aortic dissection, MI/unstable angina, PE, cardiogenic shock, CHF, CVA/TIA, intracranial lesion/mass, seizure, perforated viscous, ruptured AAA, orthostatic hypotension, vasovagal episode, anemia, dehydration, medication reaction, intentional overdose      EKG   EKG ordered and independently reviewed by me:   Nsr rate 78 normal axis STD/TWI lateral leads no reciprocal changes, normal intervals. No priors.     Labs   Lab tests ordered and independently reviewed by me:    Labs Reviewed   COMPREHENSIVE METABOLIC PANEL - Abnormal; Notable for the following components:       Result Value    CO2 20 (*)     All other components within normal limits   CBC W/ AUTO DIFFERENTIAL   TROPONIN I   TROPONIN I         Imaging   Imaging ordered and  independently reviewed by me:   Imaging Results              CT Head Without Contrast (Final result)  Result time 09/16/23 18:21:04      Final result by Amber Molina MD (09/16/23 18:21:04)                   Impression:      No appreciable acute abnormality.    Encephalomalacia in the frontal lobes bilaterally.      Electronically signed by: Amber Molina  Date:    09/16/2023  Time:    18:21               Narrative:    EXAMINATION:  CT HEAD WITHOUT CONTRAST    CLINICAL HISTORY:  Syncope, recurrent;sudden syncope, hx moyamoya;    TECHNIQUE:  Low dose axial images were obtained through the head.  Coronal and sagittal reformations were also performed. Contrast was not administered.    COMPARISON:  CT brain 08/18/2011    FINDINGS:  There is no evidence of acute intra or extra-axial hemorrhage or hematoma.  There is encephalomalacia bilaterally in the frontal lobes, greater on the right.  Gray-white matter junction differentiation is otherwise intact with no evidence of acute major arterial infarct.  There is no appreciable focal mass or mass effect.  Posterior fossa structures and pituitary gland are unremarkable allowing for skull base artifact.  Lobular mucosal thickening noted in the right maxillary sinus otherwise visualized paranasal sinuses, mastoid air cells and middle ears appear clear.  Is orbital structures and retro bulbar structures appear intact.  Bony calvarium is intact.                                           Additional Consideration   Darwin Beltrán  presents to the emergency Department today with syncope vs seizure.  Back to baseline.  Labs for workup anticipate admission.    Additional testing considered but not indicated during the course of this workup: further imaging and labwork, not indicated  Co-morbidities/chronic illness/exacerbation of chronic illness considered which impacted clinical decision making:  migraines, moyamoya  Procedures done in the ED or plan for the OR:  No  Social determinants of care considered during development of treatment plan include: Decreased medical literacy  Discussion of management or test interpretation with external provider: No  DNR discussion: No    The patient's list of active medications and allergies as documented per RN staff has been reviewed.  Medications given in the ED and/or prescribed:   Medications   acetaminophen tablet 1,000 mg (1,000 mg Oral Given 9/16/23 1930)             ED Course as of 09/16/23 1956   Sat Sep 16, 2023   1931 CBC auto differential  Independently interpreted by me, unremarkable    [CS]   1938 Comprehensive metabolic panel(!)  Independently interpreted by me, unremarkable    [CS]   1938 Troponin I: <0.006  wnl [CS]   1938 Troponin I  wnl [CS]   1938 CT Head Without Contrast  No acute findings [CS]   1938 Pt does not want to be admitted for syncopal w/u. Will leave AMA [CS]   1954 CBC auto differential  Independently interpreted by me, unremarkable    [CS]   1954 This patient has elected to leave against medical advice. In my opinion, the patient has capacity to leave made decisions and leave AMA. The patient is clinically sober, free from distracting injury, appears to have intact insight, judgment, and reason. I explained to the patient that her symptoms may represent syncope or seizure and the patient verbalized understanding of my concerns.     I had a discussion with the patient about their workup and results, and that they may still have a cardiac or neuro condition despite no symptoms and a normal w/u so far. I informed the patient that the next step in diagnosis and treatment would be admission for observation, and they verbalized understanding of this as well. I explained the risks of leaving without further workup or treatment, which included reasonably foreseeable complications such as death, serious injury, permanent disability.    The patient is refusing any further care, specifically admission, and is  leaving against medical advice. I am unable to convince the patient to stay. I have asked them to return as soon as possible to complete their evaluation, and also explained that they were welcome to return to the ER for further evaluation whenever they choose. I have asked the patient to follow up with their primary doctor as soon as possible. I have answered all their questions. Patient signed AMA paperwork.       [CS]      ED Course User Index  [CS] Joyce Gregorio MD       Explanation of disposition: AMA    Clinical Impression:     1. Syncope       ED Disposition Condition    AMA Stable               Joyce Gregorio MD  09/16/23 1956

## 2023-09-21 ENCOUNTER — OFFICE VISIT (OUTPATIENT)
Dept: PRIMARY CARE CLINIC | Facility: CLINIC | Age: 53
End: 2023-09-21
Payer: MEDICARE

## 2023-09-21 ENCOUNTER — HOSPITAL ENCOUNTER (OUTPATIENT)
Dept: RADIOLOGY | Facility: HOSPITAL | Age: 53
Discharge: HOME | End: 2023-09-21
Attending: STUDENT IN AN ORGANIZED HEALTH CARE EDUCATION/TRAINING PROGRAM
Payer: MEDICARE

## 2023-09-21 VITALS
WEIGHT: 186.75 LBS | SYSTOLIC BLOOD PRESSURE: 152 MMHG | DIASTOLIC BLOOD PRESSURE: 88 MMHG | HEIGHT: 63 IN | BODY MASS INDEX: 33.09 KG/M2 | OXYGEN SATURATION: 99 % | HEART RATE: 79 BPM

## 2023-09-21 DIAGNOSIS — M25.562 ACUTE PAIN OF BOTH KNEES: Primary | ICD-10-CM

## 2023-09-21 DIAGNOSIS — M25.561 ACUTE PAIN OF BOTH KNEES: Primary | ICD-10-CM

## 2023-09-21 DIAGNOSIS — M25.561 ACUTE PAIN OF BOTH KNEES: ICD-10-CM

## 2023-09-21 DIAGNOSIS — M25.562 ACUTE PAIN OF BOTH KNEES: ICD-10-CM

## 2023-09-21 DIAGNOSIS — I10 ESSENTIAL HYPERTENSION: ICD-10-CM

## 2023-09-21 PROCEDURE — 99999 PR PBB SHADOW E&M-EST. PATIENT-LVL IV: ICD-10-PCS | Mod: PBBFAC,,, | Performed by: STUDENT IN AN ORGANIZED HEALTH CARE EDUCATION/TRAINING PROGRAM

## 2023-09-21 PROCEDURE — 73562 X-RAY EXAM OF KNEE 3: CPT | Mod: 26,50,, | Performed by: RADIOLOGY

## 2023-09-21 PROCEDURE — 1159F MED LIST DOCD IN RCRD: CPT | Mod: CPTII,S$GLB,, | Performed by: STUDENT IN AN ORGANIZED HEALTH CARE EDUCATION/TRAINING PROGRAM

## 2023-09-21 PROCEDURE — 1160F RVW MEDS BY RX/DR IN RCRD: CPT | Mod: CPTII,S$GLB,, | Performed by: STUDENT IN AN ORGANIZED HEALTH CARE EDUCATION/TRAINING PROGRAM

## 2023-09-21 PROCEDURE — 3077F PR MOST RECENT SYSTOLIC BLOOD PRESSURE >= 140 MM HG: ICD-10-PCS | Mod: CPTII,S$GLB,, | Performed by: STUDENT IN AN ORGANIZED HEALTH CARE EDUCATION/TRAINING PROGRAM

## 2023-09-21 PROCEDURE — 99999 PR PBB SHADOW E&M-EST. PATIENT-LVL IV: CPT | Mod: PBBFAC,,, | Performed by: STUDENT IN AN ORGANIZED HEALTH CARE EDUCATION/TRAINING PROGRAM

## 2023-09-21 PROCEDURE — 3077F SYST BP >= 140 MM HG: CPT | Mod: CPTII,S$GLB,, | Performed by: STUDENT IN AN ORGANIZED HEALTH CARE EDUCATION/TRAINING PROGRAM

## 2023-09-21 PROCEDURE — 73562 X-RAY EXAM OF KNEE 3: CPT | Mod: TC,50

## 2023-09-21 PROCEDURE — 99214 PR OFFICE/OUTPT VISIT, EST, LEVL IV, 30-39 MIN: ICD-10-PCS | Mod: S$GLB,,, | Performed by: STUDENT IN AN ORGANIZED HEALTH CARE EDUCATION/TRAINING PROGRAM

## 2023-09-21 PROCEDURE — 3044F HG A1C LEVEL LT 7.0%: CPT | Mod: CPTII,S$GLB,, | Performed by: STUDENT IN AN ORGANIZED HEALTH CARE EDUCATION/TRAINING PROGRAM

## 2023-09-21 PROCEDURE — 73562 XR KNEE 3 VIEW BILATERAL: ICD-10-PCS | Mod: 26,50,, | Performed by: RADIOLOGY

## 2023-09-21 PROCEDURE — 99214 OFFICE O/P EST MOD 30 MIN: CPT | Mod: S$GLB,,, | Performed by: STUDENT IN AN ORGANIZED HEALTH CARE EDUCATION/TRAINING PROGRAM

## 2023-09-21 PROCEDURE — 3079F PR MOST RECENT DIASTOLIC BLOOD PRESSURE 80-89 MM HG: ICD-10-PCS | Mod: CPTII,S$GLB,, | Performed by: STUDENT IN AN ORGANIZED HEALTH CARE EDUCATION/TRAINING PROGRAM

## 2023-09-21 PROCEDURE — 3008F PR BODY MASS INDEX (BMI) DOCUMENTED: ICD-10-PCS | Mod: CPTII,S$GLB,, | Performed by: STUDENT IN AN ORGANIZED HEALTH CARE EDUCATION/TRAINING PROGRAM

## 2023-09-21 PROCEDURE — 1160F PR REVIEW ALL MEDS BY PRESCRIBER/CLIN PHARMACIST DOCUMENTED: ICD-10-PCS | Mod: CPTII,S$GLB,, | Performed by: STUDENT IN AN ORGANIZED HEALTH CARE EDUCATION/TRAINING PROGRAM

## 2023-09-21 PROCEDURE — 99499 UNLISTED E&M SERVICE: CPT | Mod: S$GLB,,, | Performed by: STUDENT IN AN ORGANIZED HEALTH CARE EDUCATION/TRAINING PROGRAM

## 2023-09-21 PROCEDURE — 3079F DIAST BP 80-89 MM HG: CPT | Mod: CPTII,S$GLB,, | Performed by: STUDENT IN AN ORGANIZED HEALTH CARE EDUCATION/TRAINING PROGRAM

## 2023-09-21 PROCEDURE — 3044F PR MOST RECENT HEMOGLOBIN A1C LEVEL <7.0%: ICD-10-PCS | Mod: CPTII,S$GLB,, | Performed by: STUDENT IN AN ORGANIZED HEALTH CARE EDUCATION/TRAINING PROGRAM

## 2023-09-21 PROCEDURE — 3008F BODY MASS INDEX DOCD: CPT | Mod: CPTII,S$GLB,, | Performed by: STUDENT IN AN ORGANIZED HEALTH CARE EDUCATION/TRAINING PROGRAM

## 2023-09-21 PROCEDURE — 1159F PR MEDICATION LIST DOCUMENTED IN MEDICAL RECORD: ICD-10-PCS | Mod: CPTII,S$GLB,, | Performed by: STUDENT IN AN ORGANIZED HEALTH CARE EDUCATION/TRAINING PROGRAM

## 2023-09-21 NOTE — PROGRESS NOTES
SUBJECTIVE     Chief Complaint   Patient presents with    Hospital Follow Up       HPI  Darwin Beltrán is a 53 y.o. female with medical diagnoses as listed in the medical history and problem list that presents for knee pain.      Knee pain: B/l knee pain after fall outside of a restaurant 2023. She tripped while outside. Went to the ED after. Has CT head due to concern of seizure by bystander. CT scan normal. Overall, knee pain slightly improved but persists. Occasional clinking in knees. Minor swelling. No LE weakness.    Chronic conditions:    Hypertension:  Prescribed amlodipine 10 mg daily in the past.   Patient does not want to take medication because she prefers to do things naturally with lifestyle.      Dyslipidemia:  Due for repeat lipid panel.  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  Not currently on a blood thinner.    Vitamin-D deficiency:  Not currently on supplementation.     PAST MEDICAL HISTORY:  Past Medical History:   Diagnosis Date    Chronic migraine 2021    Elevated blood pressure reading in office without diagnosis of hypertension 2020    HTN (hypertension)     Moyamoya     Stroke        PAST SURGICAL HISTORY:  Past Surgical History:   Procedure Laterality Date    BILATERAL TUBAL LIGATION      COLONOSCOPY N/A 2021    Procedure: COLONOSCOPY/SUPREP;  Surgeon: Jose Williamson MD;  Location: [facility];  Service: Endoscopy;  Laterality: N/A;  covid test   Kelly    ORIF TIBIA & FIBULA FRACTURES Right     Hardware in Place       SOCIAL HISTORY:  Social History     Socioeconomic History    Marital status:    Tobacco Use    Smoking status: Former     Current packs/day: 0.00     Average packs/day: 0.5 packs/day for 23.0 years (11.5 ttl pk-yrs)     Types: Cigarettes     Quit date: 2023     Years since quittin.4     Passive exposure: Never    Smokeless  tobacco: Never   Substance and Sexual Activity    Alcohol use: Yes     Comment: occasionally (2 drinks max per occasion)    Drug use: Never    Sexual activity: Yes     Partners: Male     Birth control/protection: None   Social History Narrative    Tobacco: Initiated in 20s; 1ppd    EtOH: Occasional; few times per year; has max use of 3 drinks at once    Drug: None    Employment: Unemployed. Receives disability for medical condition ([place])    Education: Bachelor's Degree     Lives with 3 children              FAMILY HISTORY:  Family History   Problem Relation Age of Onset    Breast cancer Mother 57         in 60s    Prostate cancer Father     Hypertension Father     Breast cancer Paternal Aunt     Breast cancer Paternal Grandmother     Lung cancer Brother     Colon cancer Brother        ALLERGIES AND MEDICATIONS: updated and reviewed.  Review of patient's allergies indicates:  No Known Allergies  Current Outpatient Medications   Medication Sig Dispense Refill    amLODIPine (NORVASC) 10 MG tablet TAKE 1 TABLET BY MOUTH EVERY DAY (Patient not taking: Reported on 2023) 30 tablet 0    aspirin (ECOTRIN) 81 MG EC tablet Take 81 mg by mouth once daily.      erenumab-aooe (AIMOVIG AUTOINJECTOR) 140 mg/mL autoinjector Inject 1 mL (140 mg total) into the skin every 28 days. (Patient not taking: Reported on 3/9/2022) 1 Syringe 11    hydroCHLOROthiazide (HYDRODIURIL) 25 MG tablet Take 1 tablet (25 mg total) by mouth once daily. (Patient not taking: Reported on 2023) 90 tablet 3    mv-mn/iron/folic acid/herb 190 (VITAMIN D3 COMPLETE ORAL) Take 1 tablet by mouth once daily.      nicotine (NICODERM CQ) 14 mg/24 hr Place 1 patch onto the skin once daily. (Patient not taking: Reported on 2022) 14 patch 0    nicotine (NICODERM CQ) 21 mg/24 hr Place 1 patch onto the skin once daily. (Patient not taking: Reported on 2022) 14 patch 0    nicotine polacrilex (NICORETTE) 4 MG Gum Take 1 each (4 mg  "total) by mouth as needed (1-2 pieces/hr in place of a cigarette. Maximum of 10 pieces/day.). (Patient not taking: Reported on 9/21/2023) 100 each 0    varenicline (CHANTIX) 1 mg Tab Take 1 tablet (1 mg total) by mouth 2 (two) times daily. /MULLINS (Patient not taking: Reported on 9/21/2023) 60 tablet 0     No current facility-administered medications for this visit.     Facility-Administered Medications Ordered in Other Visits   Medication Dose Route Frequency Provider Last Rate Last Admin    0.9%  NaCl infusion   Intravenous Continuous Jose Williamson MD 20 mL/hr at 02/24/21 1146 New Bag at 02/24/21 1146    sodium chloride 0.9% flush 10 mL  10 mL Intravenous PRN Jose Williamson MD           ROS  Review of Systems   Constitutional:  Negative for fever and weight loss.   Respiratory:  Negative for cough and shortness of breath.    Cardiovascular:  Negative for chest pain and palpitations.   Gastrointestinal:  Negative for abdominal pain, constipation, diarrhea, nausea and vomiting.   Genitourinary:  Negative for dysuria.   Musculoskeletal:  Positive for joint pain. Negative for back pain.   Skin:  Negative for rash.   Neurological:  Negative for dizziness, weakness and headaches.   Psychiatric/Behavioral:  Negative for depression. The patient is not nervous/anxious.          OBJECTIVE     Physical Exam  Vitals:    09/21/23 1007   BP: (!) 152/88   Pulse: 79    Body mass index is 33.08 kg/m².  Weight: 84.7 kg (186 lb 11.7 oz)   Height: 5' 3" (160 cm)     Physical Exam  HENT:      Head: Normocephalic and atraumatic.      Nose: Nose normal.      Mouth/Throat:      Mouth: Mucous membranes are moist.      Pharynx: Oropharynx is clear.   Eyes:      Extraocular Movements: Extraocular movements intact.      Conjunctiva/sclera: Conjunctivae normal.      Pupils: Pupils are equal, round, and reactive to light.   Cardiovascular:      Rate and Rhythm: Normal rate and regular rhythm.   Pulmonary:      Effort: Pulmonary effort is " normal.   Musculoskeletal:         General: No swelling. Normal range of motion.      Cervical back: Normal range of motion.      Right lower leg: No edema.      Left lower leg: No edema.   Skin:     General: Skin is warm.      Findings: No lesion or rash.   Neurological:      General: No focal deficit present.      Mental Status: She is alert and oriented to person, place, and time.      Motor: No weakness.   Psychiatric:         Mood and Affect: Mood normal.         Thought Content: Thought content normal.               ASSESSMENT     53 y.o. female with     1. Acute pain of both knees    2. Essential hypertension          PLAN:     1. Acute pain of both knees  -     Cancel: X-Ray Knee AP Standing Bilateral; Future; Expected date: 09/21/2023  -     X-Ray Knee 3 View Bilateral; Future; Expected date: 09/21/2023    2. Essential hypertension  Overview:  Not at goal.    Not on recommended medication.  Understands risk of not being on medication.                Alyse Flores MD

## 2023-10-09 ENCOUNTER — TELEPHONE (OUTPATIENT)
Dept: PRIMARY CARE CLINIC | Facility: CLINIC | Age: 53
End: 2023-10-09
Payer: MEDICARE

## 2023-10-09 ENCOUNTER — PATIENT MESSAGE (OUTPATIENT)
Dept: PRIMARY CARE CLINIC | Facility: CLINIC | Age: 53
End: 2023-10-09
Payer: MEDICARE

## 2023-10-09 NOTE — TELEPHONE ENCOUNTER
----- Message from Alyse Flores MD sent at 10/8/2023  8:33 PM CDT -----  Can she give an updated bp reading? Or nurse visit?  ----- Message -----  From: Alyse Flores MD  Sent: 9/30/2023  12:00 AM CDT  To: Alyse Flores MD    BP follow up

## 2023-10-12 ENCOUNTER — CLINICAL SUPPORT (OUTPATIENT)
Dept: PRIMARY CARE CLINIC | Facility: CLINIC | Age: 53
End: 2023-10-12
Payer: MEDICARE

## 2023-10-12 VITALS — HEART RATE: 63 BPM | SYSTOLIC BLOOD PRESSURE: 152 MMHG | OXYGEN SATURATION: 100 % | DIASTOLIC BLOOD PRESSURE: 88 MMHG

## 2023-10-12 DIAGNOSIS — I10 ESSENTIAL HYPERTENSION: Primary | ICD-10-CM

## 2023-10-12 PROCEDURE — 99999 PR PBB SHADOW E&M-EST. PATIENT-LVL II: ICD-10-PCS | Mod: PBBFAC,,,

## 2023-10-12 PROCEDURE — 99999 PR PBB SHADOW E&M-EST. PATIENT-LVL II: CPT | Mod: PBBFAC,,,

## 2023-10-12 NOTE — PROGRESS NOTES
Pt reports that she's taking amlodipine and HCTZ, took her meds around 7:30 AM (1 hr before BP check)    Most recent BP  (Left): 168/92, 152/88

## 2023-10-18 DIAGNOSIS — I10 HYPERTENSION, UNSPECIFIED TYPE: Primary | ICD-10-CM

## 2023-10-18 RX ORDER — LOSARTAN POTASSIUM 50 MG/1
50 TABLET ORAL DAILY
Qty: 90 TABLET | Refills: 3 | Status: SHIPPED | OUTPATIENT
Start: 2023-10-18 | End: 2024-10-17

## 2023-10-19 ENCOUNTER — TELEPHONE (OUTPATIENT)
Dept: PRIMARY CARE CLINIC | Facility: CLINIC | Age: 53
End: 2023-10-19
Payer: MEDICARE

## 2023-10-19 NOTE — TELEPHONE ENCOUNTER
----- Message from Alyse Flores MD sent at 10/18/2023  6:56 PM CDT -----        ----- Message -----  From: Pennie Sanford MA  Sent: 10/12/2023   8:40 AM CDT  To: Alyse Flores MD

## 2023-10-19 NOTE — TELEPHONE ENCOUNTER
Called pt, scheduled pt to see Adreinne for knee and arm pain, also scheduled for BP f/u appt in 1 mth w/ Dr. Flores. Pt aware to  meds

## 2023-10-25 ENCOUNTER — OFFICE VISIT (OUTPATIENT)
Dept: PRIMARY CARE CLINIC | Facility: CLINIC | Age: 53
End: 2023-10-25
Payer: MEDICARE

## 2023-10-25 VITALS
SYSTOLIC BLOOD PRESSURE: 132 MMHG | OXYGEN SATURATION: 98 % | WEIGHT: 184.5 LBS | HEART RATE: 74 BPM | DIASTOLIC BLOOD PRESSURE: 74 MMHG | BODY MASS INDEX: 32.69 KG/M2

## 2023-10-25 DIAGNOSIS — M79.601 RIGHT ARM PAIN: Primary | ICD-10-CM

## 2023-10-25 DIAGNOSIS — M25.561 ACUTE PAIN OF RIGHT KNEE: ICD-10-CM

## 2023-10-25 DIAGNOSIS — I10 ESSENTIAL HYPERTENSION: ICD-10-CM

## 2023-10-25 PROCEDURE — 3078F DIAST BP <80 MM HG: CPT | Mod: CPTII,S$GLB,, | Performed by: NURSE PRACTITIONER

## 2023-10-25 PROCEDURE — 3078F PR MOST RECENT DIASTOLIC BLOOD PRESSURE < 80 MM HG: ICD-10-PCS | Mod: CPTII,S$GLB,, | Performed by: NURSE PRACTITIONER

## 2023-10-25 PROCEDURE — 3008F PR BODY MASS INDEX (BMI) DOCUMENTED: ICD-10-PCS | Mod: CPTII,S$GLB,, | Performed by: NURSE PRACTITIONER

## 2023-10-25 PROCEDURE — 99214 PR OFFICE/OUTPT VISIT, EST, LEVL IV, 30-39 MIN: ICD-10-PCS | Mod: S$GLB,,, | Performed by: NURSE PRACTITIONER

## 2023-10-25 PROCEDURE — 1159F PR MEDICATION LIST DOCUMENTED IN MEDICAL RECORD: ICD-10-PCS | Mod: CPTII,S$GLB,, | Performed by: NURSE PRACTITIONER

## 2023-10-25 PROCEDURE — 4010F PR ACE/ARB THEARPY RXD/TAKEN: ICD-10-PCS | Mod: CPTII,S$GLB,, | Performed by: NURSE PRACTITIONER

## 2023-10-25 PROCEDURE — 3044F PR MOST RECENT HEMOGLOBIN A1C LEVEL <7.0%: ICD-10-PCS | Mod: CPTII,S$GLB,, | Performed by: NURSE PRACTITIONER

## 2023-10-25 PROCEDURE — 99999 PR PBB SHADOW E&M-EST. PATIENT-LVL IV: ICD-10-PCS | Mod: PBBFAC,,, | Performed by: NURSE PRACTITIONER

## 2023-10-25 PROCEDURE — 4010F ACE/ARB THERAPY RXD/TAKEN: CPT | Mod: CPTII,S$GLB,, | Performed by: NURSE PRACTITIONER

## 2023-10-25 PROCEDURE — 3044F HG A1C LEVEL LT 7.0%: CPT | Mod: CPTII,S$GLB,, | Performed by: NURSE PRACTITIONER

## 2023-10-25 PROCEDURE — 99214 OFFICE O/P EST MOD 30 MIN: CPT | Mod: S$GLB,,, | Performed by: NURSE PRACTITIONER

## 2023-10-25 PROCEDURE — 3008F BODY MASS INDEX DOCD: CPT | Mod: CPTII,S$GLB,, | Performed by: NURSE PRACTITIONER

## 2023-10-25 PROCEDURE — 99999 PR PBB SHADOW E&M-EST. PATIENT-LVL IV: CPT | Mod: PBBFAC,,, | Performed by: NURSE PRACTITIONER

## 2023-10-25 PROCEDURE — 3075F SYST BP GE 130 - 139MM HG: CPT | Mod: CPTII,S$GLB,, | Performed by: NURSE PRACTITIONER

## 2023-10-25 PROCEDURE — 1159F MED LIST DOCD IN RCRD: CPT | Mod: CPTII,S$GLB,, | Performed by: NURSE PRACTITIONER

## 2023-10-25 PROCEDURE — 3075F PR MOST RECENT SYSTOLIC BLOOD PRESS GE 130-139MM HG: ICD-10-PCS | Mod: CPTII,S$GLB,, | Performed by: NURSE PRACTITIONER

## 2023-10-25 NOTE — PROGRESS NOTES
[facility] Note    Chief Complaint      Chief Complaint   Patient presents with    Knee Pain    Arm Pain       History of Present Illness      Darwin Beltrán is a 53 y.o. female with chronic conditions of chronic migraine, HTN, moyamoya, CVA with right sided weakness who presents today for: pt fell 1 month ago outside of a restaurant, and fell on concrete and hit both knees, still has continued pain to right knee and right forearm.  Pt saw Dr. Flores for hospital follow up. Pt did have right knee x-ray , unremarkable. Pt describes right knee pain constant, describes as throbbing. Pt takes aleve and aspirin with some relief. Hurts after sitting for long periods. Pt also described right forearm pain as constant, describes as aching. Worse after trying to lift. Can be with rest or movement. No shoulder pain.     HTN: bp at goal today. Currently taking losartan.     Past Medical History:  Past Medical History:   Diagnosis Date    Chronic migraine 2021    Elevated blood pressure reading in office without diagnosis of hypertension 2020    HTN (hypertension)     Moyamoya     Stroke        Past Surgical History:   has a past surgical history that includes ORIF tibia & fibula fractures (Right, ); Bilateral tubal ligation (); and Colonoscopy (N/A, 2021).    Family History:  family history includes Breast cancer in her paternal aunt and paternal grandmother; Breast cancer (age of onset: 57) in her mother; Colon cancer in her brother; Hypertension in her father; Lung cancer in her brother; Prostate cancer in her father.     Social History:  Social History     Tobacco Use    Smoking status: Former     Current packs/day: 0.00     Average packs/day: 0.5 packs/day for 23.0 years (11.5 ttl pk-yrs)     Types: Cigarettes     Quit date: 2023     Years since quittin.4     Passive exposure: Never    Smokeless tobacco: Never   Substance Use Topics    Alcohol use: Yes      Comment: occasionally (2 drinks max per occasion)    Drug use: Never       Review of Systems   Constitutional:  Negative for chills and fever.   Respiratory:  Negative for cough and shortness of breath.    Cardiovascular:  Negative for chest pain and palpitations.   Gastrointestinal:  Negative for constipation, diarrhea, nausea and vomiting.   Genitourinary:  Negative for dysuria and hematuria.   Musculoskeletal:  Positive for joint pain (right arm pain and right knee pain). Negative for back pain and falls.   Neurological:  Negative for headaches.        Medications:  Outpatient Encounter Medications as of 10/25/2023   Medication Sig Dispense Refill    losartan (COZAAR) 50 MG tablet Take 1 tablet (50 mg total) by mouth once daily. 90 tablet 3    mv-mn/iron/folic acid/herb 190 (VITAMIN D3 COMPLETE ORAL) Take 1 tablet by mouth once daily.      [DISCONTINUED] amLODIPine (NORVASC) 10 MG tablet TAKE 1 TABLET BY MOUTH EVERY DAY 30 tablet 0    [DISCONTINUED] aspirin (ECOTRIN) 81 MG EC tablet Take 81 mg by mouth once daily.      [DISCONTINUED] hydroCHLOROthiazide (HYDRODIURIL) 25 MG tablet Take 1 tablet (25 mg total) by mouth once daily. 90 tablet 3    [DISCONTINUED] erenumab-aooe (AIMOVIG AUTOINJECTOR) 140 mg/mL autoinjector Inject 1 mL (140 mg total) into the skin every 28 days. (Patient not taking: Reported on 3/9/2022) 1 Syringe 11    [DISCONTINUED] nicotine (NICODERM CQ) 14 mg/24 hr Place 1 patch onto the skin once daily. (Patient not taking: Reported on 8/30/2022) 14 patch 0    [DISCONTINUED] nicotine (NICODERM CQ) 21 mg/24 hr Place 1 patch onto the skin once daily. (Patient not taking: Reported on 8/30/2022) 14 patch 0    [DISCONTINUED] nicotine polacrilex (NICORETTE) 4 MG Gum Take 1 each (4 mg total) by mouth as needed (1-2 pieces/hr in place of a cigarette. Maximum of 10 pieces/day.). (Patient not taking: Reported on 9/21/2023) 100 each 0    [DISCONTINUED] varenicline (CHANTIX) 1 mg Tab Take 1 tablet (1 mg total)  by mouth 2 (two) times daily. /HARPREET (Patient not taking: Reported on 9/21/2023) 60 tablet 0     Facility-Administered Encounter Medications as of 10/25/2023   Medication Dose Route Frequency Provider Last Rate Last Admin    0.9%  NaCl infusion   Intravenous Continuous Jose Williamson MD 20 mL/hr at 02/24/21 1146 New Bag at 02/24/21 1146    sodium chloride 0.9% flush 10 mL  10 mL Intravenous PRN Jose Williamson MD           Allergies:  Review of patient's allergies indicates:  No Known Allergies    Health Maintenance:  Immunization History   Administered Date(s) Administered    COVID-19 MRNA, LN-S PF (MODERNA HALF 0.25 ML DOSE) 02/22/2022    COVID-19, MRNA, LN-S, PF (MODERNA FULL 0.5 ML DOSE) 04/22/2021, 05/20/2021    DTP 02/09/1971, 07/18/1972    MMR 03/09/1998    Measles / Rubella 04/21/1972    OPV 1970, 03/19/1971, 06/07/1971, 03/02/1973, 08/13/1976    PPD Test 09/29/1971, 04/18/1972    Pneumococcal Conjugate - 20 Valent 02/09/2023    Pneumococcal Polysaccharide - 23 Valent 02/18/2020, 12/07/2020    Td (ADULT) 08/22/1978, 03/09/1998    Tdap 02/18/2020      Health Maintenance   Topic Date Due    High Dose Statin  Never done    Shingles Vaccine (1 of 2) Never done    Mammogram  08/03/2024    Colorectal Cancer Screening  02/24/2026    Lipid Panel  02/10/2028    TETANUS VACCINE  02/18/2030    Hepatitis C Screening  Completed        Physical Exam      Vital Signs  Pulse: 74  SpO2: 98 %  BP: 132/74  BP Location: Left arm  Pain Score:   9  Height and Weight  Weight: 83.7 kg (184 lb 8.4 oz)]    Physical Exam  Constitutional:       Appearance: She is well-developed.   HENT:      Head: Normocephalic and atraumatic.   Cardiovascular:      Rate and Rhythm: Normal rate and regular rhythm.      Heart sounds: Normal heart sounds. No murmur heard.  Pulmonary:      Effort: Pulmonary effort is normal. No respiratory distress.      Breath sounds: Normal breath sounds.   Musculoskeletal:         General: No swelling or  tenderness. Normal range of motion.      Comments: Right knee pain with internal rotation. Clicking with squatting.  No right forearm swelling noted, or TTP   Skin:     General: Skin is warm and dry.   Neurological:      Mental Status: She is alert. Mental status is at baseline.   Psychiatric:         Behavior: Behavior normal.          Laboratory:  CBC:  Recent Labs   Lab 02/08/21  1030 02/10/23  0754 09/16/23  1625   WBC 8.03 10.25 9.20   RBC 4.78 4.57 4.64   Hemoglobin 13.9 13.2 13.3   Hematocrit 43.5 43.9 41.3   Platelets 325 382 233   MCV 91 96 89   MCH 29.1 28.9 28.7   MCHC 32.0 30.1 L 32.2     CMP:  Recent Labs   Lab 02/08/21  1030 02/10/23  0754 09/16/23  1625   Glucose 86 79 84   Calcium 9.6 9.5 9.3   Albumin 4.0 3.8 4.1   Total Protein 7.3 7.0 7.0   Sodium 141 143 142   Potassium 4.9 4.0 3.6   CO2 27 22 L 20 L   Chloride 107 107 110   BUN 12 11 9   Alkaline Phosphatase 103 98 92   ALT 13 9 L 14   AST 17 19 14   Total Bilirubin 0.2 0.2 0.3     URINALYSIS:       LIPIDS:  Recent Labs   Lab 02/08/21  1030 02/10/23  0754   TSH  --  4.489 H   HDL 47 40   Cholesterol 232 H 226 H   Triglycerides 121 197 H   LDL Cholesterol 160.8 H 146.6   HDL/Cholesterol Ratio 20.3 17.7 L   Non-HDL Cholesterol 185 186   Total Cholesterol/HDL Ratio 4.9 5.7 H     TSH:  Recent Labs   Lab 02/10/23  0754   TSH 4.489 H     A1C:  Recent Labs   Lab 02/08/21  1030 02/10/23  0754   Hemoglobin A1C 5.6 5.1       Assessment/Plan     Darwin Beltrán is a 53 y.o.female with:    1. Right arm pain  - Ambulatory referral/consult to Orthopedics; Future  - Ambulatory referral/consult to Physical/Occupational Therapy; Future    2. Acute pain of right knee  - Ambulatory referral/consult to Orthopedics; Future  - Ambulatory referral/consult to Physical/Occupational Therapy; Future    3. Essential Hypertension  Stable. Continue current meds.         Chronic conditions status updated as per HPI.  Other than changes above, cont current medications and  maintain follow up with specialists.  No follow-ups on file.    Future Appointments   Date Time Provider Department Center   10/26/2023 11:00 AM Hamlet Lee NP [facility]   11/17/2023  9:30 AM Alyse Flores MD [facility] [place] Adreinne Cotaya, FNP [facility]

## 2023-10-26 ENCOUNTER — HOSPITAL ENCOUNTER (OUTPATIENT)
Dept: RADIOLOGY | Facility: HOSPITAL | Age: 53
Discharge: HOME | End: 2023-10-26
Attending: NURSE PRACTITIONER
Payer: MEDICARE

## 2023-10-26 ENCOUNTER — OFFICE VISIT (OUTPATIENT)
Dept: ORTHOPEDICS | Facility: CLINIC | Age: 53
End: 2023-10-26
Payer: MEDICARE

## 2023-10-26 VITALS — WEIGHT: 184.5 LBS | BODY MASS INDEX: 32.69 KG/M2 | HEIGHT: 63 IN

## 2023-10-26 DIAGNOSIS — M79.601 RIGHT ARM PAIN: ICD-10-CM

## 2023-10-26 DIAGNOSIS — M79.601 RIGHT ARM PAIN: Primary | ICD-10-CM

## 2023-10-26 DIAGNOSIS — M25.561 ACUTE PAIN OF RIGHT KNEE: ICD-10-CM

## 2023-10-26 DIAGNOSIS — M77.11 LATERAL EPICONDYLITIS OF RIGHT ELBOW: Primary | ICD-10-CM

## 2023-10-26 PROCEDURE — 1159F PR MEDICATION LIST DOCUMENTED IN MEDICAL RECORD: ICD-10-PCS | Mod: CPTII,S$GLB,, | Performed by: NURSE PRACTITIONER

## 2023-10-26 PROCEDURE — 73564 X-RAY EXAM KNEE 4 OR MORE: CPT | Mod: 26,RT,, | Performed by: RADIOLOGY

## 2023-10-26 PROCEDURE — 1159F MED LIST DOCD IN RCRD: CPT | Mod: CPTII,S$GLB,, | Performed by: NURSE PRACTITIONER

## 2023-10-26 PROCEDURE — 99204 PR OFFICE/OUTPT VISIT, NEW, LEVL IV, 45-59 MIN: ICD-10-PCS | Mod: S$GLB,,, | Performed by: NURSE PRACTITIONER

## 2023-10-26 PROCEDURE — 3044F HG A1C LEVEL LT 7.0%: CPT | Mod: CPTII,S$GLB,, | Performed by: NURSE PRACTITIONER

## 2023-10-26 PROCEDURE — 3008F BODY MASS INDEX DOCD: CPT | Mod: CPTII,S$GLB,, | Performed by: NURSE PRACTITIONER

## 2023-10-26 PROCEDURE — 99999 PR PBB SHADOW E&M-EST. PATIENT-LVL III: CPT | Mod: PBBFAC,,, | Performed by: NURSE PRACTITIONER

## 2023-10-26 PROCEDURE — 73080 X-RAY EXAM OF ELBOW: CPT | Mod: 26,RT,, | Performed by: RADIOLOGY

## 2023-10-26 PROCEDURE — 99999 PR PBB SHADOW E&M-EST. PATIENT-LVL III: ICD-10-PCS | Mod: PBBFAC,,, | Performed by: NURSE PRACTITIONER

## 2023-10-26 PROCEDURE — 1160F PR REVIEW ALL MEDS BY PRESCRIBER/CLIN PHARMACIST DOCUMENTED: ICD-10-PCS | Mod: CPTII,S$GLB,, | Performed by: NURSE PRACTITIONER

## 2023-10-26 PROCEDURE — 73562 X-RAY EXAM OF KNEE 3: CPT | Mod: TC,LT

## 2023-10-26 PROCEDURE — 73080 X-RAY EXAM OF ELBOW: CPT | Mod: TC,RT

## 2023-10-26 PROCEDURE — 99204 OFFICE O/P NEW MOD 45 MIN: CPT | Mod: S$GLB,,, | Performed by: NURSE PRACTITIONER

## 2023-10-26 PROCEDURE — 73562 X-RAY EXAM OF KNEE 3: CPT | Mod: 26,LT,, | Performed by: RADIOLOGY

## 2023-10-26 PROCEDURE — 1160F RVW MEDS BY RX/DR IN RCRD: CPT | Mod: CPTII,S$GLB,, | Performed by: NURSE PRACTITIONER

## 2023-10-26 PROCEDURE — 73564 XR KNEE ORTHO RIGHT WITH FLEXION: ICD-10-PCS | Mod: 26,RT,, | Performed by: RADIOLOGY

## 2023-10-26 PROCEDURE — 73562 XR KNEE ORTHO RIGHT WITH FLEXION: ICD-10-PCS | Mod: 26,LT,, | Performed by: RADIOLOGY

## 2023-10-26 PROCEDURE — 4010F ACE/ARB THERAPY RXD/TAKEN: CPT | Mod: CPTII,S$GLB,, | Performed by: NURSE PRACTITIONER

## 2023-10-26 PROCEDURE — 73080 XR ELBOW COMPLETE 3 VIEW RIGHT: ICD-10-PCS | Mod: 26,RT,, | Performed by: RADIOLOGY

## 2023-10-26 PROCEDURE — 3008F PR BODY MASS INDEX (BMI) DOCUMENTED: ICD-10-PCS | Mod: CPTII,S$GLB,, | Performed by: NURSE PRACTITIONER

## 2023-10-26 PROCEDURE — 4010F PR ACE/ARB THEARPY RXD/TAKEN: ICD-10-PCS | Mod: CPTII,S$GLB,, | Performed by: NURSE PRACTITIONER

## 2023-10-26 PROCEDURE — 3044F PR MOST RECENT HEMOGLOBIN A1C LEVEL <7.0%: ICD-10-PCS | Mod: CPTII,S$GLB,, | Performed by: NURSE PRACTITIONER

## 2023-10-26 NOTE — PROGRESS NOTES
Subjective:     Patient ID: Darwin Beltrán is a 53 y.o. female.    Chief Complaint: Pain of the Right Elbow and Pain of the Right Knee    Darwin Betlrán is a 53 y.o. female with PMH of chronic migraine, HTN, moyamoya, CVA with right and left sided weakness who comes to Orthopedics for right elbow and right knee pain s/p fall approximately 1 month ago.  She reports her knee buckle and she fell down to the ground with arms outstretched.  She was able to get up and walk after the incident and therefore did not seek medical care.  She self-treated herself with aleve and bear body in pain medication which gave her adequate relief.  Unfortunately her pain has not settled down and therefore was seen by internal medicine who later referred the patient to Orthopedics for further evaluation.  Patient denies any shoulder pain.  Denies any prior injuries to her right arm but endorses that she had fractured her right tibia back in 2004 which required IM nailing that was done at [facility].  Does endorse some intermittent numbness to her arm and leg.  She reports she has generalized weakness on both sides of her body secondary to prior CVAs.      In regards to her pain, her right elbow pain is more of a achy pain that she rates at a 3 to 4/10.  Pain is worse with activities such as opening doors or lifting objects.  Her knee pain is roughly 4 to 5/10 and worse with bending and squatting activities.        Review of Systems   Musculoskeletal:  Positive for falls.        Right elbow and right knee pain   All other systems reviewed and are negative.      Objective:       General    Vitals reviewed.  Constitutional: She is oriented to person, place, and time. She appears well-developed and well-nourished. No distress.   HENT:   Head: Normocephalic and atraumatic.   Eyes: Conjunctivae are normal. Pupils are equal, round, and reactive to light.   Neck: Neck supple.   Cardiovascular:  Intact distal pulses.             Pulmonary/Chest: Effort normal.   Neurological: She is alert and oriented to person, place, and time. She has normal reflexes.   Psychiatric: She has a normal mood and affect. Her behavior is normal. Judgment and thought content normal.           Right Knee Exam   Right knee exam is normal.    Right Hand/Wrist Exam     Inspection   Deformity: Wrist - deformity     Range of Motion   The patient has normal right hand/wrist ROM.      Right Elbow Exam     Inspection   Bruising: absent  Deformity: absent    Pain   The patient exhibits pain of the lateral epicondyle    Tenderness   The patient is tender to palpation of the lateral epicondyle.     Range of Motion   The patient has normal right elbow ROM.    Tests   Tennis Elbow: mild  Golfer's Elbow: negative          Muscle Strength   Right Upper Extremity   Wrist extension: 5/5   Wrist flexion: 5/5   : 4/5   Elbow Pronation:  4/5   Elbow Supination:  4/5   Elbow Extension: 5/5  Elbow Flexion: 5/5      Physical Exam  Vitals reviewed.   Constitutional:       General: She is not in acute distress.     Appearance: She is well-developed and well-nourished. She is not diaphoretic.   HENT:      Head: Normocephalic and atraumatic.   Eyes:      Conjunctiva/sclera: Conjunctivae normal.      Pupils: Pupils are equal, round, and reactive to light.   Cardiovascular:      Pulses: Intact distal pulses.   Pulmonary:      Effort: Pulmonary effort is normal.   Musculoskeletal:      Right elbow: No deformity.      Right wrist: No deformity.      Cervical back: Neck supple.   Neurological:      Mental Status: She is alert and oriented to person, place, and time.      Deep Tendon Reflexes: Reflexes are normal and symmetric.   Psychiatric:         Mood and Affect: Mood and affect normal.         Behavior: Behavior normal.         Thought Content: Thought content normal.         Judgment: Judgment normal.     RADS:  Right elbow and right knee x-rays were obtained and personally reviewed  by me.  Right elbow is unremarkable no acute fracture seen.  Right knee shows an IM nailing to the tibia.  No evidence of hardware failure or new fracture seen.  She has a healed proximal fibular fracture.  Knee joint space appears to be well approximated.    Assessment:     Encounter Diagnoses   Name Primary?    Lateral epicondylitis of right elbow     Acute pain of right knee        Plan:      Darwin was seen today for pain and pain.    Diagnoses and all orders for this visit:    Lateral epicondylitis of right elbow  -     Ambulatory referral/consult to Orthopedics    Acute pain of right knee  -     Ambulatory referral/consult to Orthopedics      I suspect the patient has a lateral epicondylitis on the right elbow.  Recommend occupational therapy in addition to the physical therapy previously ordered.  She can weight bear as tolerated range of motion as tolerated.  Patient does have a history of CVA time to the his left her with generalized weakness on both sides of her body and therefore physical therapy should be able to assist the patient in general strengthening.    In regards to her right knee she likely has some posttraumatic osteoarthritis.  Her retained hardware appears to be in good position and alignment.  No acute fracture seen.    Will treat the patient with a lateral epicondylitis brace.  I advised the patient that pain from a tennis elbow can last for several weeks if not months.  I will refer the patient to occupational therapy.  And recommend that she use over-the-counter topical creams such as Aspercreme with lidocaine or Voltaren gel as needed.  She may continue using her Tylenol or Aleve p.r.n..    Follow up 3 months PRN.

## 2023-10-30 ENCOUNTER — CLINICAL SUPPORT (OUTPATIENT)
Dept: REHABILITATION | Facility: HOSPITAL | Age: 53
End: 2023-10-30
Attending: NURSE PRACTITIONER
Payer: MEDICARE

## 2023-10-30 DIAGNOSIS — M79.621 PAIN OF RIGHT UPPER ARM: ICD-10-CM

## 2023-10-30 DIAGNOSIS — M25.60 STIFFNESS IN JOINT: ICD-10-CM

## 2023-10-30 DIAGNOSIS — M77.11 LATERAL EPICONDYLITIS OF RIGHT ELBOW: ICD-10-CM

## 2023-10-30 DIAGNOSIS — R53.1 WEAKNESS: ICD-10-CM

## 2023-10-30 PROCEDURE — 97140 MANUAL THERAPY 1/> REGIONS: CPT | Mod: PN

## 2023-10-30 PROCEDURE — 97165 OT EVAL LOW COMPLEX 30 MIN: CPT | Mod: PN

## 2023-10-30 NOTE — PLAN OF CARE
[facility] Occupational Therapy  Initial Evaluation     Name: Darwin Beltrán  Clinic Number: [id number]    Therapy Diagnosis:   Encounter Diagnoses   Name Primary?    Lateral epicondylitis of right elbow     Pain of right upper arm     Weakness     Stiffness in joint      Physician: Hamlet Lee NP    Physician Orders: Eval and Treat  Medical Diagnosis: M77.11 (ICD-10-CM) - Lateral epicondylitis of right elbow  Date of Injury: 9/16/2023  Evaluation Date: 10/30/2023  Insurance Authorization Period Expiration: 12/31/2023  Plan of Care Certification Period: 1/26/2023  Date of Return to MD: none scheduled  Visit # / Visits authorized: 1 / 1  FOTO: 1/3    Precautions:  Standard and Fall    Time In: 11:45  Time Out: 12:30  Total Appointment Time (timed & untimed codes): 45 minutes    Subjective     History of Current Condition/Mechanism of Injury: Darwin reports: she has a history of CVA affecting both sides however most recent was 2013. Pt states she recently had her knee buckle resulting in a fall onto right side. Pt presents today with c/o pain right elbow. Signs and symptoms consistent with lateral epicondylitis.     Involved Side: Right  Dominant Side: Right    Imaging: No fracture, dislocation, bone destruction, or joint effusion seen.    Prior Therapy: none for elbow    Pain:  Functional Pain Scale Rating 0-10:   2/10 on average  0/10 at best  10/10 at worst with certain motions  Location: Right lateral epicondyle  Description: Aching  Aggravating Factors: Night Time and Lifting  Easing Factors: brace, aleve    Occupation:  doesn't work    Functional Limitations/Social History:    Previous functional status includes: Independent with all ADLs.     Current Functional Status   Home/Living environment: lives with their kids      Limitation of Functional Status as follows:   ADLs/IADLs:     - Feeding: Independent    - Bathing: Independent    - Dressing/Grooming: Independent    - Home  Management: picking up heavy stuff around the house    - Driving: Independent     Leisure: Independent    Patient's Goals for Therapy: to decrease pain and increase functional use right upper extremity     Past Medical History/Physical Systems Review:   Darwin Beltrán  has a past medical history of Chronic migraine, Elevated blood pressure reading in office without diagnosis of hypertension, HTN (hypertension), [place], and Stroke.    Darwin Beltrán  has a past surgical history that includes ORIF tibia & fibula fractures (Right, 2004); Bilateral tubal ligation (2009); and Colonoscopy (N/A, 2/24/2021).    Darwin has a current medication list which includes the following prescription(s): losartan and mv-mn/iron/folic acid/herb 190, and the following Facility-Administered Medications: sodium chloride 0.9% and sodium chloride 0.9%.    Review of patient's allergies indicates:  No Known Allergies     Objective     Observation/Appearance: pt to clinic with elbow brace intact    Sensation Test: intact    Edema. Measured in centimeters.   10/30/2023 10/30/2023    Left Right   Elbow 27 27   Wrist Crease 16 15.5     Range of Motion/Strength:  Elbow and Wrist ROM. Measured in degrees.   10/30/2023 10/30/2023      Left Right     Elbow Extension/Flexion 0/130 10/135     Supination/Pronation WNL WNL     Wrist Ext/Flex 58/60 48/62     Wrist RD/UD 18/35 10/35        Strength (Dynamometer Rung II) and Pinch Strength (Pinch Gauge)  Measured in pounds.   10/30/2023 10/30/2023    Left Right   Elbow Flexed 45 35   Elbow Extended 40 25*pain   Key Pinch 17 11   3pt Pinch 12 5   2pt Pinch 8 4     Special Tests: right   - Cozen's (lateral epicondylitis) test: +   - Mill's (lateral epicondylitis) test: +   - Extensor Digitorum (lateral epicondylitis) test: +   - Medial epicondylitis test: -  Intake Outcome Measure for FOTO Elbow Survey    Therapist reviewed FOTO scores for Darwin Beltrán on 10/30/2023.   FOTO documents  entered into [facility] - see Media section.    Intake Score: 54%         Treatment     Total Treatment time (time-based codes) separate from Evaluation: 8 minutes    Darwin received the following manual therapy techniques for 5 minutes:   -ASTYM right elbow, wrist and hand    Darwin received therapeutic activities for 3 minutes including:  -ASTYM stretches    Patient Education and Home Exercises      Education provided:   -role of OT, goals for OT, scheduling/cancellations, insurance limitations with patient.  -Additional Education provided: on current condition and home exercise program     Written Home Exercises Provided: Patient instructed to cont prior HEP.  Exercises were reviewed and Darwin was able to demonstrate them prior to the end of the session.    Darwin demonstrated good  understanding of the education provided.     Pt was advised to perform these exercises free of pain, and to stop performing them if pain occurs.    See EMR under Patient Instructions for exercises provided prior visit.    Assessment     Darwin Beltrán is a 53 y.o. female referred to outpatient occupational therapy and presents with a medical diagnosis of Right elbow pain. Pt has a history of CVA affecting both sides however most recent CVA was 2013. Pt states she recently in September had her knee buckle resulting in a fall onto right side. Pt presents today with c/o pain right elbow. Signs and symptoms consistent with lateral epicondylitis.     Assessment of Occupational Performance   Performance Deficits    Physical:  Joint Mobility  Muscle Power/Strength  Muscle Endurance   Strength  Pinch Strength  Pain    Cognitive:  No Deficits    Psychosocial:    No Deficits     Following medical record review it is determined that pt will benefit from occupational therapy services in order to maximize pain free and/or functional use of right elbow. The following goals were discussed with the patient and patient is in agreement with them as to  "be addressed in the treatment plan. The patient's rehab potential is Good.     Anticipated barriers to occupational therapy: none    Plan of care discussed with patient: Yes  Patient's spiritual, cultural and educational needs considered and patient is agreeable to the plan of care and goals as stated below:     Medical Necessity is demonstrated by the following  Occupational Profile/History  Co-morbidities and personal factors that may impact the plan of care [x] LOW: Brief chart review  [] MODERATE: Expanded chart review   [] HIGH: Extensive chart review    Moderate / High Support Documentation: N/A     Examination  Performance deficits relating to physical, cognitive or psychosocial skills that result in activity limitations and/or participation restrictions  [x] LOW: addressing 1-3 Performance deficits  [] MODERATE: 3-5 Performance deficits  [] HIGH: 5+ Performance deficits (please support below)    Moderate / High Support Documentation: N/A     Treatment Options [x] LOW: Limited options  [] MODERATE: Several options  [] HIGH: Multiple options      Decision Making/ Complexity Score: low       The following goals were discussed with the patient and patient is in agreement with them as to be addressed in the treatment plan.     Long Term Goals:  Goals to be met by discharge:  1) Independent with HEP  2) Pt will increase Right wrist range of motion to within functional limits grossly in order to increase functional use with home management or work related tasks by d/c.   3) Pt will increase right  and pinch by 5 pounds and without pain for improved performance with activities of daily living"s and IADL"s   4) Pt will decrease pain to trace or none while completing light home management tasks or work related tasks by d/c.   5) Patient will be able to achieve less than or equal to 25% on the FOTO, demonstrating overall improved functional ability with upper extremity.  (Self-care category)    Plan "     Certification Period/Plan of care expiration: 10/30/2023 to 12/31/2023.    Outpatient Occupational Therapy 1 times weekly for 8 weeks to include the following interventions: Manual therapy/joint mobilizations, Modalities for pain management, Therapeutic exercises/activities., Strengthening, Joint Protection, and Energy Conservation.    NAYELI Duque

## 2023-11-06 NOTE — PROGRESS NOTES
"[facility]  Occupational Therapy Treatment Note     Date: 11/7/2023  Name: Darwin Beltrán  Clinic Number: [id number]    Therapy Diagnosis:   Encounter Diagnoses   Name Primary?    Pain of right upper arm Yes    Weakness     Stiffness in joint      Physician: Hamlet Lee NP    Physician Orders: Eval and Treat  Medical Diagnosis: M77.11 (ICD-10-CM) - Lateral epicondylitis of right elbow  Date of Injury: 9/16/2023  Evaluation Date: 10/30/2023  Insurance Authorization Period Expiration: 12/31/2023  Plan of Care Certification Period: 1/26/2023  Date of Return to MD: none scheduled  Visit # / Visits authorized: 2 / 1  FOTO: 1/3     Precautions:  Standard and Fall     Time In: 7:45  Time Out: 8:30  Total Appointment Time (timed & untimed codes): 45 minutes    Subjective     Pt reports: "I haven't really done my stretches, I've been sick."  she was compliant with home exercise program given last session.   Response to previous treatment:high pain  Functional change: none noted    Pain: 8/10  Location: right elbow        Objective     Objective Measures updated at progress report unless specified.   Observation/Appearance: pt to clinic with elbow brace intact     Sensation Test: intact     Edema. Measured in centimeters.    10/30/2023 10/30/2023     Left Right   Elbow 27 27   Wrist Crease 16 15.5      Range of Motion/Strength:  Elbow and Wrist ROM. Measured in degrees.    10/30/2023 10/30/2023         Left Right       Elbow Extension/Flexion 0/130 10/135       Supination/Pronation WNL WNL       Wrist Ext/Flex 58/60 48/62       Wrist RD/UD 18/35 10/35           Strength (Dynamometer Rung II) and Pinch Strength (Pinch Gauge)  Measured in pounds.    10/30/2023 10/30/2023     Left Right   Elbow Flexed 45 35   Elbow Extended 40 25*pain   Key Pinch 17 11   3pt Pinch 12 5   2pt Pinch 8 4      Special Tests: right              - Cozen's (lateral epicondylitis) test: +              - Mill's " "(lateral epicondylitis) test: +              - Extensor Digitorum (lateral epicondylitis) test: +              - Medial epicondylitis test: -  Intake Outcome Measure for FOTO Elbow Survey     Therapist reviewed FOTO scores for Darwin Beltrán on 10/30/2023.   FOTO documents entered into [facility] - see Media section.     Intake Score: 54%         Treatment     Darwin received the treatments listed below:     Supervised modalities after being cleared for contradictions: Hot Pack -right elbow x 5 minutes for pain management and tissue extensibility    Manual therapy techniques: Soft tissue Mobilization were applied to the: right elbow for 10 minutes, including:  -ASTYM right elbow, wrist and hand    Therapeutic exercises to develop ROM for 15 minutes, including:  -ASTYM stretches 1-3 3/30"  -elbow 3 ways range of motion 2/10  -wrist 3 ways range of motion 2/10  -dexerciser 2minutes  -supination/pronation 2/10    Neuromuscular re-education activities to improve Posture for 15 minutes. The following activities were included:  -Bilateral external rotation red band 2/10  -Bilateral horizontal abduction red 2/10    Patient Education and Home Exercises     Education provided:   - on current condition and home exercise program   - Progress towards goals     Written Home Exercises Provided: Patient instructed to cont prior HEP.  Exercises were reviewed and Darwin was able to demonstrate them prior to the end of the session.  Darwin demonstrated good  understanding of the HEP provided.   .   See EMR under Patient Instructions for exercises provided prior visit.      Assessment     Pt with good participation this date despite increased pain reported initially. Signs and symptoms of fibrotic tissue noted during ASTYM however tolerated fairly. Pt able to perform all range of motion exercises this date in a pain free range of motion with good technique. Will see how she feels after today's session and progress to light strengthening " "next visit if pain decreased and controlled. Reinforced importance of home exercise program.     Darwin is progressing well towards her goals and there are no updates to goals at this time. Pt prognosis is Good.     Pt will continue to benefit from skilled outpatient occupational therapy to address the deficits listed in the problem list on initial evaluation provide pt/family education and to maximize pt's level of independence in the home and community environment.     Anticipated barriers to occupational therapy: none    Pt's spiritual, cultural and educational needs considered and pt agreeable to plan of care and goals.    Goals:  Long Term Goals:  Goals to be met by discharge:  1) Independent with home exercise program --Not met, progressing  2) Pt will increase Right wrist range of motion to within functional limits grossly in order to increase functional use with home management or work related tasks by d/c. -Not met, progressing  3) Pt will increase right  and pinch by 5 pounds and without pain for improved performance with activities of daily living"s and IADL"s -Not met, progressing  4) Pt will decrease pain to trace or none while completing light home management tasks or work related tasks by d/c. -Not met, progressing  5) Patient will be able to achieve less than or equal to 25% on the FOTO, demonstrating overall improved functional ability with upper extremity.  (Self-care category) -Not met, progressing    Plan     Continue with OT plan of care   Updates/Grading for next session: progress as able    NAYELI Duque     "

## 2023-11-07 ENCOUNTER — CLINICAL SUPPORT (OUTPATIENT)
Dept: REHABILITATION | Facility: HOSPITAL | Age: 53
End: 2023-11-07
Payer: MEDICARE

## 2023-11-07 DIAGNOSIS — M25.60 STIFFNESS IN JOINT: ICD-10-CM

## 2023-11-07 DIAGNOSIS — R53.1 WEAKNESS: ICD-10-CM

## 2023-11-07 DIAGNOSIS — M79.621 PAIN OF RIGHT UPPER ARM: Primary | ICD-10-CM

## 2023-11-07 PROCEDURE — 97140 MANUAL THERAPY 1/> REGIONS: CPT | Mod: PN

## 2023-11-07 PROCEDURE — 97112 NEUROMUSCULAR REEDUCATION: CPT | Mod: PN

## 2023-11-07 PROCEDURE — 97110 THERAPEUTIC EXERCISES: CPT | Mod: PN

## 2023-11-15 ENCOUNTER — OFFICE VISIT (OUTPATIENT)
Dept: PRIMARY CARE CLINIC | Facility: CLINIC | Age: 53
End: 2023-11-15
Payer: MEDICARE

## 2023-11-15 VITALS
BODY MASS INDEX: 32.19 KG/M2 | HEART RATE: 76 BPM | OXYGEN SATURATION: 99 % | WEIGHT: 181.69 LBS | DIASTOLIC BLOOD PRESSURE: 89 MMHG | HEIGHT: 63 IN | SYSTOLIC BLOOD PRESSURE: 139 MMHG

## 2023-11-15 DIAGNOSIS — I10 ESSENTIAL HYPERTENSION: ICD-10-CM

## 2023-11-15 DIAGNOSIS — J01.90 ACUTE BACTERIAL SINUSITIS: Primary | ICD-10-CM

## 2023-11-15 DIAGNOSIS — B96.89 ACUTE BACTERIAL SINUSITIS: Primary | ICD-10-CM

## 2023-11-15 PROCEDURE — 3075F PR MOST RECENT SYSTOLIC BLOOD PRESS GE 130-139MM HG: ICD-10-PCS | Mod: CPTII,S$GLB,, | Performed by: STUDENT IN AN ORGANIZED HEALTH CARE EDUCATION/TRAINING PROGRAM

## 2023-11-15 PROCEDURE — 3075F SYST BP GE 130 - 139MM HG: CPT | Mod: CPTII,S$GLB,, | Performed by: STUDENT IN AN ORGANIZED HEALTH CARE EDUCATION/TRAINING PROGRAM

## 2023-11-15 PROCEDURE — 1160F PR REVIEW ALL MEDS BY PRESCRIBER/CLIN PHARMACIST DOCUMENTED: ICD-10-PCS | Mod: CPTII,S$GLB,, | Performed by: STUDENT IN AN ORGANIZED HEALTH CARE EDUCATION/TRAINING PROGRAM

## 2023-11-15 PROCEDURE — 1159F PR MEDICATION LIST DOCUMENTED IN MEDICAL RECORD: ICD-10-PCS | Mod: CPTII,S$GLB,, | Performed by: STUDENT IN AN ORGANIZED HEALTH CARE EDUCATION/TRAINING PROGRAM

## 2023-11-15 PROCEDURE — 4010F PR ACE/ARB THEARPY RXD/TAKEN: ICD-10-PCS | Mod: CPTII,S$GLB,, | Performed by: STUDENT IN AN ORGANIZED HEALTH CARE EDUCATION/TRAINING PROGRAM

## 2023-11-15 PROCEDURE — 3008F BODY MASS INDEX DOCD: CPT | Mod: CPTII,S$GLB,, | Performed by: STUDENT IN AN ORGANIZED HEALTH CARE EDUCATION/TRAINING PROGRAM

## 2023-11-15 PROCEDURE — 3079F PR MOST RECENT DIASTOLIC BLOOD PRESSURE 80-89 MM HG: ICD-10-PCS | Mod: CPTII,S$GLB,, | Performed by: STUDENT IN AN ORGANIZED HEALTH CARE EDUCATION/TRAINING PROGRAM

## 2023-11-15 PROCEDURE — 99214 OFFICE O/P EST MOD 30 MIN: CPT | Mod: S$GLB,,, | Performed by: STUDENT IN AN ORGANIZED HEALTH CARE EDUCATION/TRAINING PROGRAM

## 2023-11-15 PROCEDURE — 99999 PR PBB SHADOW E&M-EST. PATIENT-LVL III: ICD-10-PCS | Mod: PBBFAC,,, | Performed by: STUDENT IN AN ORGANIZED HEALTH CARE EDUCATION/TRAINING PROGRAM

## 2023-11-15 PROCEDURE — 4010F ACE/ARB THERAPY RXD/TAKEN: CPT | Mod: CPTII,S$GLB,, | Performed by: STUDENT IN AN ORGANIZED HEALTH CARE EDUCATION/TRAINING PROGRAM

## 2023-11-15 PROCEDURE — 3079F DIAST BP 80-89 MM HG: CPT | Mod: CPTII,S$GLB,, | Performed by: STUDENT IN AN ORGANIZED HEALTH CARE EDUCATION/TRAINING PROGRAM

## 2023-11-15 PROCEDURE — 1159F MED LIST DOCD IN RCRD: CPT | Mod: CPTII,S$GLB,, | Performed by: STUDENT IN AN ORGANIZED HEALTH CARE EDUCATION/TRAINING PROGRAM

## 2023-11-15 PROCEDURE — 1160F RVW MEDS BY RX/DR IN RCRD: CPT | Mod: CPTII,S$GLB,, | Performed by: STUDENT IN AN ORGANIZED HEALTH CARE EDUCATION/TRAINING PROGRAM

## 2023-11-15 PROCEDURE — 99999 PR PBB SHADOW E&M-EST. PATIENT-LVL III: CPT | Mod: PBBFAC,,, | Performed by: STUDENT IN AN ORGANIZED HEALTH CARE EDUCATION/TRAINING PROGRAM

## 2023-11-15 PROCEDURE — 99214 PR OFFICE/OUTPT VISIT, EST, LEVL IV, 30-39 MIN: ICD-10-PCS | Mod: S$GLB,,, | Performed by: STUDENT IN AN ORGANIZED HEALTH CARE EDUCATION/TRAINING PROGRAM

## 2023-11-15 PROCEDURE — 3008F PR BODY MASS INDEX (BMI) DOCUMENTED: ICD-10-PCS | Mod: CPTII,S$GLB,, | Performed by: STUDENT IN AN ORGANIZED HEALTH CARE EDUCATION/TRAINING PROGRAM

## 2023-11-15 PROCEDURE — 3044F HG A1C LEVEL LT 7.0%: CPT | Mod: CPTII,S$GLB,, | Performed by: STUDENT IN AN ORGANIZED HEALTH CARE EDUCATION/TRAINING PROGRAM

## 2023-11-15 PROCEDURE — 3044F PR MOST RECENT HEMOGLOBIN A1C LEVEL <7.0%: ICD-10-PCS | Mod: CPTII,S$GLB,, | Performed by: STUDENT IN AN ORGANIZED HEALTH CARE EDUCATION/TRAINING PROGRAM

## 2023-11-15 RX ORDER — AMOXICILLIN AND CLAVULANATE POTASSIUM 875; 125 MG/1; MG/1
1 TABLET, FILM COATED ORAL EVERY 12 HOURS
Qty: 20 TABLET | Refills: 0 | Status: SHIPPED | OUTPATIENT
Start: 2023-11-15 | End: 2023-11-15 | Stop reason: SDUPTHER

## 2023-11-15 RX ORDER — FLUTICASONE PROPIONATE 50 MCG
2 SPRAY, SUSPENSION (ML) NASAL 2 TIMES DAILY
Qty: 16 G | Refills: 1 | Status: SHIPPED | OUTPATIENT
Start: 2023-11-15 | End: 2023-11-15 | Stop reason: SDUPTHER

## 2023-11-15 RX ORDER — AMOXICILLIN AND CLAVULANATE POTASSIUM 875; 125 MG/1; MG/1
1 TABLET, FILM COATED ORAL EVERY 12 HOURS
Qty: 20 TABLET | Refills: 0 | Status: SHIPPED | OUTPATIENT
Start: 2023-11-15 | End: 2023-11-25

## 2023-11-15 RX ORDER — FLUTICASONE PROPIONATE 50 MCG
2 SPRAY, SUSPENSION (ML) NASAL 2 TIMES DAILY
Qty: 16 G | Refills: 1 | Status: SHIPPED | OUTPATIENT
Start: 2023-11-15

## 2023-11-15 NOTE — PROGRESS NOTES
INTERNAL MEDICINE SAME DAY PRIMARY CARE VISIT NOTE    Subjective:     Chief Complaint: Cough and Nasal Congestion       Patient ID: Darwin Beltrán is a 53 y.o. female , here today for focused same-day primary care visit.    Today, patient with complaint of sinus pain.    Sxs started 9 days ago with runny nose and nasal congestion. Sxs initially seemed to slightly improve, but over the last few days she started having maxillary sinus pain. No cp, sob, difficulty breathing. No fevers.   Taking Mucinex and cough drops with minimal relief.    HTN: losartan 50 mg daily    Past Medical History:  Past Medical History:   Diagnosis Date    Chronic migraine 2021    Elevated blood pressure reading in office without diagnosis of hypertension 2020    HTN (hypertension)     [place]     Stroke        Home Medications:  Prior to Admission medications    Medication Sig Start Date End Date Taking? Authorizing Provider   losartan (COZAAR) 50 MG tablet Take 1 tablet (50 mg total) by mouth once daily.  Patient not taking: Reported on 11/15/2023 10/18/23 10/17/24  Alyse Flores MD   mv-mn/iron/folic acid/herb 190 (VITAMIN D3 COMPLETE ORAL) Take 1 tablet by mouth once daily.    Provider, Historical       Allergies:  Review of patient's allergies indicates:  No Known Allergies    Social History:  Social History     Tobacco Use    Smoking status: Former     Current packs/day: 0.00     Average packs/day: 0.5 packs/day for 23.0 years (11.5 ttl pk-yrs)     Types: Cigarettes     Quit date: 2023     Years since quittin.5     Passive exposure: Never    Smokeless tobacco: Never   Substance Use Topics    Alcohol use: Yes     Comment: occasionally (2 drinks max per occasion)    Drug use: Never         Review of Systems   Constitutional:  Positive for fatigue. Negative for diaphoresis and fever.   HENT:  Positive for congestion, rhinorrhea and sinus pain. Negative for ear pain, sneezing, sore throat and voice  "change.    Eyes:  Negative for discharge, redness and itching.   Respiratory:  Negative for shortness of breath and wheezing.    Cardiovascular:  Negative for chest pain.   Gastrointestinal:  Negative for abdominal pain.   Skin:  Negative for rash.   Neurological:  Negative for weakness.           Objective:   /89   Pulse 76   Ht 5' 3" (1.6 m)   Wt 82.4 kg (181 lb 10.5 oz)   LMP  (LMP Unknown)   SpO2 99%   BMI 32.18 kg/m²        General: AAO x3, no apparent distress  HEENT: PERRL, OP clear  CV: RRR, no m/r/g  Pulm: Lungs CTAB, no crackles, no wheezes  Abd: s/NT/ND +BS  Extremities: no c/c/e    Labs:         Assessment/Plan     Darwin was seen today for cough and nasal congestion.    Diagnoses and all orders for this visit:    Acute bacterial sinusitis  -     Discontinue: amoxicillin-clavulanate 875-125mg (AUGMENTIN) 875-125 mg per tablet; Take 1 tablet by mouth every 12 (twelve) hours. for 10 days  -     Discontinue: fluticasone propionate (FLONASE) 50 mcg/actuation nasal spray; 2 sprays (100 mcg total) by Each Nostril route 2 (two) times a day.  -     amoxicillin-clavulanate 875-125mg (AUGMENTIN) 875-125 mg per tablet; Take 1 tablet by mouth every 12 (twelve) hours. for 10 days  -     fluticasone propionate (FLONASE) 50 mcg/actuation nasal spray; 2 sprays (100 mcg total) by Each Nostril route 2 (two) times a day.  - discussed etiology of symptoms and treatment: prescribed Augmentin. Recommend low dose intranasal steroids and Antihistamine, and nasal saline rinse. OTC cough suppressants and lozenges prn. Pt instructed to notify clinic if fever develops, or no improvement.    Essential hypertension  Stable on medications, continue regimen        RTC prn     Alyse Flores MD  Department of Internal Medicine - [facility]        "

## 2023-11-16 ENCOUNTER — CLINICAL SUPPORT (OUTPATIENT)
Dept: REHABILITATION | Facility: HOSPITAL | Age: 53
End: 2023-11-16
Payer: MEDICARE

## 2023-11-16 DIAGNOSIS — M25.561 ACUTE PAIN OF RIGHT KNEE: ICD-10-CM

## 2023-11-16 DIAGNOSIS — M79.601 RIGHT ARM PAIN: ICD-10-CM

## 2023-11-16 PROCEDURE — 97162 PT EVAL MOD COMPLEX 30 MIN: CPT | Mod: PN

## 2023-11-16 PROCEDURE — 97110 THERAPEUTIC EXERCISES: CPT | Mod: PN

## 2023-11-16 NOTE — PLAN OF CARE
[facility]  Physical Therapy Initial Evaluation    Name: Darwin Beltrán  Clinic Number: [id number]    Therapy Diagnosis:   Encounter Diagnoses   Name Primary?    Right arm pain     Acute pain of right knee         Physician: Rachael Diaz NP    Physician Orders: PT Eval and Treat   Medical Diagnosis from Referral:   M79.601 (ICD-10-CM) - Right arm pain   M25.561 (ICD-10-CM) - Acute pain of right knee   Evaluation Date: 11/16/2023  Authorization Period Expiration: 1/11/2024  Plan of Care Expiration: 1/10/2024  Visit # / Visits authorized: 1/1   FOTO: 1/5    Precautions: Standard and history of stroke       Time In: 8:10 am  Time Out: 9:00 am  Total Appointment Time (timed & untimed codes): 50 minutes (1 MCE) (1 TE)    SUBJECTIVE     Date of onset: ~ 2 months     History of current condition - Darwin reports: she had a fall about 2 months and injured her right arm and right knee. Patient reports she had a previous fall about 2 years ago as well and attributes it to her right leg giving out. Patient reports previous history of a right tib/fib fracture. Aggravating factors include walking, stairs, and standing too long. Easing factors include rest.     Falls: 1    Imaging: see above     Prior Therapy: yes, following her right tib/fib fracture   Social History: lives with her 3 children   Occupation: disability   Prior Level of Function: very minimal knee issues prior to fall; right knee stiffness at times   Current Level of Function: difficulty with ambulating, stairs, and ADLs    Pain:  Current 4/10, worst 10/10, best 4/10   Location: right knee  Description: sharp, aching   Aggravating Factors: see above   Easing Factors: see above     Patients goals:   Patient would like to improve her right knee to previous level of function.      Medical History:   Past Medical History:   Diagnosis Date    Chronic migraine 06/30/2021    Elevated blood pressure reading in office without diagnosis  "of hypertension 11/11/2020    HTN (hypertension)     [place] 2011    Stroke        Surgical History:   Darwin Beltrán  has a past surgical history that includes ORIF tibia & fibula fractures (Right, 2004); Bilateral tubal ligation (2009); and Colonoscopy (N/A, 2/24/2021).    Medications:   Darwin has a current medication list which includes the following prescription(s): fluticasone propionate, losartan, and mv-mn/iron/folic acid/herb 190, and the following Facility-Administered Medications: sodium chloride 0.9% and sodium chloride 0.9%.    Allergies:   Review of patient's allergies indicates:  No Known Allergies       OBJECTIVE     Observation: slow antoine    Palpation: tender to palpation around medial/inferior patella     AROM: *denotes pain  right knee: (0-2-130 degrees)  left knee: (0-0-130 degrees)    L/E Strength w/ MicroFET Muscle Carlos Manuel Dynamometer Right Left Pain/Dysfunction with Movement   (approx 4 sec hold w/ max contraction)   Hip Flexion  4/5   4+/5    Hip Abduction  4/5   4+/5    Quadriceps  8.8 kg   14.4 kg     Hamstrings  5.1 kg   8.3 kg         Limitation/Restriction for FOTO Knee Survey    Therapist reviewed FOTO scores for Darwin Beltrán on 11/16/2023.   FOTO documents entered into [employer] - see Media section.    Limitation Score: 60%  Predicted Limitation Score: 45%         TREATMENT     Total Treatment time (time-based codes) separate from Evaluation: 15 minutes      Darwin received the treatments listed below:      therapeutic exercises to develop strength, endurance, ROM, and flexibility for 15 minutes including:  Quad sets: 5"x10   Quad set with straight leg raise: 10x   Short arc quad: 10x - 3#        PATIENT EDUCATION AND HOME EXERCISES     Education provided:   - home exercise program   - POC/Prognosis     Written Home Exercises Provided: yes. Exercises were reviewed and Darwin was able to demonstrate them prior to the end of the session.  Darwin demonstrated good  understanding of " the education provided. See EMR under Patient Instructions for exercises provided during therapy sessions.    ASSESSMENT     Darwin is a 53 y.o. female referred to outpatient Physical Therapy with a medical diagnosis of right knee pain. Patient presents with patellar tendonitis of the right knee. Objective tests and measures displaying lack of terminal knee extension of the right lower extremity and strength deficits of the right quadriceps, hamstrings, and hip flexor in comparison to the left lower extremity. Patient would benefit from skilled PT in order to regain full right knee extension and strength in order to return to previous level of function.     Patient prognosis is Good.   Patient will benefit from skilled outpatient Physical Therapy to address the deficits stated above and in the chart below, provide patient /family education, and to maximize patientt's level of independence.     Plan of care discussed with patient: Yes  Patient's spiritual, cultural and educational needs considered and patient is agreeable to the plan of care and goals as stated below:     Anticipated Barriers for therapy: history of stroke     Medical Necessity is demonstrated by the following  History  Co-morbidities and personal factors that may impact the plan of care Co-morbidities:   high BMI, history of CVA, and HTN    Personal Factors:   lifestyle     high   Examination  Body Structures and Functions, activity limitations and participation restrictions that may impact the plan of care Body Regions:   lower extremities    Body Systems:    gross symmetry  ROM  strength  gross coordinated movement  balance  gait  transfers  transitions  motor control  motor learning    Participation Restrictions:   Community ambulation     Activity limitations:   Learning and applying knowledge  no deficits    General Tasks and Commands  no deficits    Communication  no deficits    Mobility  lifting and carrying objects  walking    Self care  no  deficits    Domestic Life  assisting others    Interactions/Relationships  no deficits    Life Areas  no deficits    Community and Social Life  no deficits         high   Clinical Presentation evolving clinical presentation with changing clinical characteristics moderate   Decision Making/ Complexity Score: moderate     Goals:  Short Term Goals:    1. Pt will be independent with HEP supplement PT in improving functional mobility.  2. Pt will improve right LE strength to at least 75% of left LE strength as measured via MicroFet handheld dynamometer in order to improve functional mobility  3. Pt will improve right knee AROM to at least (0-0-130 degrees) in order to improve gait    Long Term Goals:  1. Pt will be independent with updated HEP supplement PT in improving functional mobility.  2. Pt will improve right LE strength to at least 90% of left LE strength as measured via MicroFet handheld dynamometer in order to improve functional mobility  3. Pt will improve FOTO knee survey score to </= 45% limited in order to demo improved functional mobility  4. Pt will perform TUG in < 12 seconds without AD in order to demo improved gait speed  5. Pt will perform at least 12 sit to stands without UE support on 30 second sit to stand test in order to demo improved ability to perform transfers  6. Pt will improve right knee AROM to at least (0-0-130 degrees) in order to improve gait      Plan     Plan of care Certification: 11/16/2023 to 1/10/2024.    Outpatient Physical Therapy 1 times weekly for 6 weeks to include the following interventions: Gait Training, Manual Therapy, Moist Heat/ Ice, Neuromuscular Re-ed, Orthotic Management and Training, Patient Education, Therapeutic Activites and Therapeutic Exercise, ASTYM, Kinesiotape    Jose G Etienne, PT, DPT

## 2023-11-20 NOTE — PROGRESS NOTES
"[facility]  Occupational Therapy Treatment Note     Date: 11/21/2023  Name: Darwin Beltrán  Clinic Number: [id number]    Therapy Diagnosis:   Encounter Diagnoses   Name Primary?    Pain of right upper arm Yes    Weakness     Stiffness in joint      Physician: Hamlet Lee NP    Physician Orders: Eval and Treat  Medical Diagnosis: M77.11 (ICD-10-CM) - Lateral epicondylitis of right elbow  Date of Injury: 9/16/2023  Evaluation Date: 10/30/2023  Insurance Authorization Period Expiration: 12/31/2023  Plan of Care Certification Period: 1/26/2023  Date of Return to MD: none scheduled  Visit # / Visits authorized: 3 / 1  FOTO: 1/3     Precautions:  Standard and Fall     Time In: 7:45  Time Out: 8:30  Total Appointment Time (timed & untimed codes): 45 minutes    Subjective     Pt reports: "I've been using this arm more this week cooking and holding my grandchildren."  she was compliant with home exercise program given last session.   Response to previous treatment: decreased pain  Functional change: improved range of motion, decreased strength    Pain: 3/10  Location: right elbow      Objective     Objective Measures updated at progress report unless specified.   Observation/Appearance: pt to clinic with elbow brace intact     Sensation Test: intact     Edema. Measured in centimeters.    10/30/2023 10/30/2023     Left Right   Elbow 27 27   Wrist Crease 16 15.5      Range of Motion/Strength:  Elbow and Wrist ROM. Measured in degrees.    10/30/2023 10/30/2023  11/21/2023       Left Right  Right     Elbow Extension/Flexion 0/130 10/135  5/135     Supination/Pronation WNL WNL WNL      Wrist Ext/Flex 58/60 48/62 60/72      Wrist RD/UD 18/35 10/35 12/35         Strength (Dynamometer Rung II) and Pinch Strength (Pinch Gauge)  Measured in pounds.    10/30/2023 10/30/2023 11/21/2023     Left Right Right   Elbow Flexed 45 35 15   Elbow Extended 40 25*pain 15   Key Pinch 17 11 9   3pt Pinch " "12 5 5   2pt Pinch 8 4 3      Special Tests: right              - Cozen's (lateral epicondylitis) test: +              - Mill's (lateral epicondylitis) test: +              - Extensor Digitorum (lateral epicondylitis) test: +              - Medial epicondylitis test: -  Intake Outcome Measure for FOTO Elbow Survey     Therapist reviewed FOTO scores for Darwin Beltrán on 10/30/2023.   FOTO documents entered into [facility] - see Media section.     Intake Score: 54%         Treatment     Darwin received the treatments listed below:     Supervised modalities after being cleared for contradictions: Hot Pack -right elbow x 5 minutes for pain management and tissue extensibility    Manual therapy techniques: Soft tissue Mobilization were applied to the: right elbow for 10 minutes, including:  -ASTYM right elbow, wrist and hand    Therapeutic exercises to develop ROM for 15 minutes, including:  -ASTYM stretches 1-3 3/30"  -elbow 3 ways range of motion 1# 2/15  -wrist 3 ways range of motion 1# 2/15  -supination/pronation 1# 2/15  -red flexbar smiles/frowns 2/15  -Scifit Ube forward/reverse Level 1 3 minutes each direction    Neuromuscular re-education activities to improve Posture for 15 minutes. The following activities were included:  -Bilateral external rotation red band 2/10  -Bilateral horizontal abduction red 2/10    Patient Education and Home Exercises     Education provided:   - on current condition and home exercise program   - Progress towards goals     Written Home Exercises Provided: Patient instructed to cont prior HEP.  Exercises were reviewed and Darwin was able to demonstrate them prior to the end of the session.  Darwin demonstrated good  understanding of the HEP provided.   .   See EMR under Patient Instructions for exercises provided prior visit.      Assessment     Pt with good participation this date. Pain report decreased despite patient's stating she has been using arm more. Signs and symptoms of fibrotic " "tissue noted during ASTYM however tolerated fairly. Pt able to perform all range of motion exercises this date in a pain free range of motion with good technique. Tolerated progression to gentle strengthening exercises performing all without complaints. Range of motion improving, strength decreased.     Darwin is progressing fairly towards her goals and there are no updates to goals at this time. Pt prognosis is fair.     Pt will continue to benefit from skilled outpatient occupational therapy to address the deficits listed in the problem list on initial evaluation provide pt/family education and to maximize pt's level of independence in the home and community environment.     Anticipated barriers to occupational therapy: none    Pt's spiritual, cultural and educational needs considered and pt agreeable to plan of care and goals.    Goals:  Long Term Goals:  Goals to be met by discharge:  1) Independent with home exercise program --Not met, progressing  2) Pt will increase Right wrist range of motion to within functional limits grossly in order to increase functional use with home management or work related tasks by d/c. -Not met, progressing  3) Pt will increase right  and pinch by 5 pounds and without pain for improved performance with activities of daily living"s and IADL"s -Not met, progressing  4) Pt will decrease pain to trace or none while completing light home management tasks or work related tasks by d/c. -Not met, progressing  5) Patient will be able to achieve less than or equal to 25% on the FOTO, demonstrating overall improved functional ability with upper extremity.  (Self-care category) -Not met, progressing    Plan     Continue with OT plan of care   Updates/Grading for next session: progress as able    NAYELI Duque     "

## 2023-11-21 ENCOUNTER — CLINICAL SUPPORT (OUTPATIENT)
Dept: REHABILITATION | Facility: HOSPITAL | Age: 53
End: 2023-11-21
Payer: MEDICARE

## 2023-11-21 DIAGNOSIS — M79.621 PAIN OF RIGHT UPPER ARM: Primary | ICD-10-CM

## 2023-11-21 DIAGNOSIS — M25.60 STIFFNESS IN JOINT: ICD-10-CM

## 2023-11-21 DIAGNOSIS — R53.1 WEAKNESS: ICD-10-CM

## 2023-11-21 PROCEDURE — 97112 NEUROMUSCULAR REEDUCATION: CPT | Mod: PN

## 2023-11-21 PROCEDURE — 97140 MANUAL THERAPY 1/> REGIONS: CPT | Mod: PN

## 2023-11-21 PROCEDURE — 97110 THERAPEUTIC EXERCISES: CPT | Mod: PN

## 2023-11-27 NOTE — PROGRESS NOTES
"[facility]  Occupational Therapy Treatment Note     Date: 11/28/2023  Name: Darwin Beltrán  Clinic Number: [id number]    Therapy Diagnosis:   Encounter Diagnoses   Name Primary?    Pain of right upper arm Yes    Weakness     Stiffness in joint      Physician: Hamlet Lee NP    Physician Orders: Eval and Treat  Medical Diagnosis: M77.11 (ICD-10-CM) - Lateral epicondylitis of right elbow  Date of Injury: 9/16/2023  Evaluation Date: 10/30/2023  Insurance Authorization Period Expiration: 12/31/2023  Plan of Care Certification Period: 1/26/2023  Date of Return to MD: none scheduled  Visit # / Visits authorized: 4/ 1  FOTO: 2/3     Precautions:  Standard and Fall     Time In: 7:45  Time Out: 8:30  Total Appointment Time (timed & untimed codes): 45 minutes    Subjective     Pt reports: "I don't see any improvement in this elbow."  she was compliant with home exercise program given last session.   Response to previous treatment: increased pain  Functional change: improved FOTO score    Pain: 5/10  Location: right elbow      Objective     Objective Measures updated at progress report unless specified.   Observation/Appearance: pt to clinic with elbow brace intact     Sensation Test: intact     Edema. Measured in centimeters.    10/30/2023 10/30/2023     Left Right   Elbow 27 27   Wrist Crease 16 15.5      Range of Motion/Strength:  Elbow and Wrist ROM. Measured in degrees.    10/30/2023 10/30/2023  11/21/2023       Left Right  Right     Elbow Extension/Flexion 0/130 10/135  5/135     Supination/Pronation WNL WNL WNL      Wrist Ext/Flex 58/60 48/62 60/72      Wrist RD/UD 18/35 10/35 12/35         Strength (Dynamometer Rung II) and Pinch Strength (Pinch Gauge)  Measured in pounds.    10/30/2023 10/30/2023 11/21/2023     Left Right Right   Elbow Flexed 45 35 15   Elbow Extended 40 25*pain 15   Key Pinch 17 11 9   3pt Pinch 12 5 5   2pt Pinch 8 4 3      Special Tests: right             " " - Cozen's (lateral epicondylitis) test: +              - Mill's (lateral epicondylitis) test: +              - Extensor Digitorum (lateral epicondylitis) test: +              - Medial epicondylitis test: -  Intake Outcome Measure for FOTO Elbow Survey     Therapist reviewed FOTO scores for Darwin Beltrán on 10/30/2023.   FOTO documents entered into [facility] - see Media section.     Intake Score: 54%  4th visit:50%         Treatment     Darwin received the treatments listed below:     Supervised modalities after being cleared for contradictions: Hot Pack -right elbow x 5 minutes for pain management and tissue extensibility    Manual therapy techniques: Soft tissue Mobilization were applied to the: right elbow for 10 minutes, including:  -ASTYM right elbow, wrist and hand    Therapeutic exercises to develop ROM for 15 minutes, including:  -ASTYM stretches 1-3 3/30"  -elbow 3 ways range of motion 1# 2/15  -wrist 3 ways range of motion 1# 2/15  -supination/pronation 1# 2/15  -red flexbar smiles/frowns 2/15  -Scifit Ube forward/reverse Level 1 3 minutes each direction  -pom pom pickup 1 container brown gripper second setting    Neuromuscular re-education activities to improve Posture for 15 minutes. The following activities were included:  -Bilateral external rotation red band 2/15  -Bilateral horizontal abduction red 2/15    Patient Education and Home Exercises     Education provided:   - on current condition and home exercise program   - Progress towards goals     Written Home Exercises Provided: Patient instructed to cont prior HEP.  Exercises were reviewed and Darwin was able to demonstrate them prior to the end of the session.  Darwin demonstrated good  understanding of the HEP provided.   .   See EMR under Patient Instructions for exercises provided prior visit.      Assessment     Pt with good participation this date. Pain report increased this date however did not limit her in today's session. Signs and symptoms " "of fibrotic tissue noted during ASTYM however tolerated fairly. Pt able to perform all range of motion exercises this date in a pain free range of motion with good technique. Tolerated progression to gentle strengthening exercises performing all without complaints. Slight improvement noted with FOTO score.     Darwin is progressing fairly towards her goals and there are no updates to goals at this time. Pt prognosis is fair.     Pt will continue to benefit from skilled outpatient occupational therapy to address the deficits listed in the problem list on initial evaluation provide pt/family education and to maximize pt's level of independence in the home and community environment.     Anticipated barriers to occupational therapy: none    Pt's spiritual, cultural and educational needs considered and pt agreeable to plan of care and goals.    Goals:  Long Term Goals:  Goals to be met by discharge:  1) Independent with home exercise program --Not met, progressing  2) Pt will increase Right wrist range of motion to within functional limits grossly in order to increase functional use with home management or work related tasks by d/c. -Not met, progressing  3) Pt will increase right  and pinch by 5 pounds and without pain for improved performance with activities of daily living"s and IADL"s -Not met, progressing  4) Pt will decrease pain to trace or none while completing light home management tasks or work related tasks by d/c. -Not met, progressing  5) Patient will be able to achieve less than or equal to 25% on the FOTO, demonstrating overall improved functional ability with upper extremity.  (Self-care category) -Not met, progressing    Plan     Continue with OT plan of care   Updates/Grading for next session: progress as able    NAYELI Duque     "

## 2023-11-28 ENCOUNTER — CLINICAL SUPPORT (OUTPATIENT)
Dept: REHABILITATION | Facility: HOSPITAL | Age: 53
End: 2023-11-28
Payer: MEDICARE

## 2023-11-28 DIAGNOSIS — M79.621 PAIN OF RIGHT UPPER ARM: Primary | ICD-10-CM

## 2023-11-28 DIAGNOSIS — M25.60 STIFFNESS IN JOINT: ICD-10-CM

## 2023-11-28 DIAGNOSIS — R53.1 WEAKNESS: ICD-10-CM

## 2023-11-28 PROBLEM — R68.89 DECREASED STRENGTH, ENDURANCE, AND MOBILITY: Status: ACTIVE | Noted: 2023-10-30

## 2023-11-28 PROBLEM — Z74.09 DECREASED STRENGTH, ENDURANCE, AND MOBILITY: Status: ACTIVE | Noted: 2023-10-30

## 2023-11-28 PROCEDURE — 97140 MANUAL THERAPY 1/> REGIONS: CPT | Mod: PN

## 2023-11-28 PROCEDURE — 97112 NEUROMUSCULAR REEDUCATION: CPT | Mod: PN

## 2023-11-28 PROCEDURE — 97110 THERAPEUTIC EXERCISES: CPT | Mod: PN

## 2023-11-29 ENCOUNTER — CLINICAL SUPPORT (OUTPATIENT)
Dept: REHABILITATION | Facility: HOSPITAL | Age: 53
End: 2023-11-29
Payer: MEDICARE

## 2023-11-29 DIAGNOSIS — R53.1 DECREASED STRENGTH, ENDURANCE, AND MOBILITY: Primary | ICD-10-CM

## 2023-11-29 DIAGNOSIS — Z74.09 DECREASED STRENGTH, ENDURANCE, AND MOBILITY: Primary | ICD-10-CM

## 2023-11-29 DIAGNOSIS — R68.89 DECREASED STRENGTH, ENDURANCE, AND MOBILITY: Primary | ICD-10-CM

## 2023-11-29 PROCEDURE — 97112 NEUROMUSCULAR REEDUCATION: CPT | Mod: PN

## 2023-11-29 PROCEDURE — 97110 THERAPEUTIC EXERCISES: CPT | Mod: PN

## 2023-11-29 NOTE — PROGRESS NOTES
"[facility]   Physical Therapy Treatment Note      Name: Darwin Beltrán  Clinic Number: [id number]    Therapy Diagnosis: No diagnosis found.  Physician: Rachael Diaz NP    Visit Date: 11/29/2023    Physician Orders: PT Eval and Treat   Medical Diagnosis from Referral:   M79.601 (ICD-10-CM) - Right arm pain   M25.561 (ICD-10-CM) - Acute pain of right knee   Evaluation Date: 11/16/2023  Authorization Period Expiration: 1/11/2024  Plan of Care Expiration: 1/10/2024  Visit # / Visits authorized: 1/1 (+1)  FOTO: 1/5     Precautions: Standard and history of stroke        Time In: 8:05 am  Time Out: 9:00 am  Total Appointment Time (timed & untimed codes): 55 minutes (1 NM) (1 TE)    PTA Visit #: 0/5       Subjective     Patient reports: less shooting pain under the knee cap.     She was not compliant with home exercise program.  Response to previous treatment: ongoing   Functional change: ongoing     Pain: 3/10  Location: right knee     Objective      Objective Measures updated at progress report unless specified.     Treatment     Darwin received the treatments listed below:      therapeutic exercises to develop strength, endurance, ROM, and flexibility for 32 minutes including:    Recumbent Bike: level 2 - 5 minutes   Propped knee extension on small maroon bolster: 5 minutes   Bridge: 2x10  Side-lying hip abduction: 2x10  Heel raises: 2x10  Lateral band walks: red - length of mat (5 laps)  Standing hip abduction: red - 2x10 each   Standing hip adduction: red - 2x10 each     neuromuscular re-education activities to improve: Coordination, Kinesthetic, Sense, and Proprioception for 23 minutes. The following activities were included:    Quad sets: 20x5"  SLR: 2x10  SAQ: 4# - 3x10  LAQ: 4# - 3x10       Patient Education and Home Exercises       Education provided:   - compliance with HEP    Written Home Exercises Provided: yes. Exercises were reviewed and Darwin was able to demonstrate them " prior to the end of the session.  Darwin demonstrated good  understanding of the education provided. See Electronic Medical Record under Patient Instructions for exercises provided during therapy sessions    Assessment     Darwin is a 53 y.o. female referred to outpatient Physical Therapy with a medical diagnosis of right knee pain. Patient presents with patellar tendonitis of the right knee. Initiated quadriceps neuromuscular control and loading with good tolerance, but fatigue. Heel prop for terminal knee extension range. Overall, good tolerance to first follow up and will monitor response.     Darwin Is progressing well towards her goals.   Patient prognosis is Good.     Patient will continue to benefit from skilled outpatient physical therapy to address the deficits listed in the problem list box on initial evaluation, provide pt/family education and to maximize pt's level of independence in the home and community environment.     Patient's spiritual, cultural and educational needs considered and pt agreeable to plan of care and goals.     Anticipated barriers to physical therapy: history of stroke    Goals:   Short Term Goals:     1. Pt will be independent with HEP supplement PT in improving functional mobility.  2. Pt will improve right LE strength to at least 75% of left LE strength as measured via MicroFet handheld dynamometer in order to improve functional mobility  3. Pt will improve right knee AROM to at least (0-0-130 degrees) in order to improve gait     Long Term Goals:  1. Pt will be independent with updated HEP supplement PT in improving functional mobility.  2. Pt will improve right LE strength to at least 90% of left LE strength as measured via MicroFet handheld dynamometer in order to improve functional mobility  3. Pt will improve FOTO knee survey score to </= 45% limited in order to demo improved functional mobility  4. Pt will perform TUG in < 12 seconds without AD in order to demo improved gait  speed  5. Pt will perform at least 12 sit to stands without UE support on 30 second sit to stand test in order to demo improved ability to perform transfers  6. Pt will improve right knee AROM to at least (0-0-130 degrees) in order to improve gait    Plan     Continue physical therapist plan of care     Aidee Marie, PT

## 2023-12-04 NOTE — PROGRESS NOTES
"[facility]  Occupational Therapy Treatment Note     Date: 12/5/2023  Name: Darwin Beltrán  Clinic Number: [id number]    Therapy Diagnosis:   Encounter Diagnoses   Name Primary?    Pain of right upper arm Yes    Stiffness in joint      Physician: Hamlet Lee NP    Physician Orders: Eval and Treat  Medical Diagnosis: M77.11 (ICD-10-CM) - Lateral epicondylitis of right elbow  Date of Injury: 9/16/2023  Evaluation Date: 10/30/2023  Insurance Authorization Period Expiration: 12/31/2023  Plan of Care Certification Period: 1/26/2023  Date of Return to MD: none scheduled  Visit # / Visits authorized: 5/ 1  FOTO: 2/3     Precautions:  Standard and Fall     Time In: 7:45  Time Out: 8:30  Total Appointment Time (timed & untimed codes): 45 minutes    Subjective     Pt reports: "My elbow is the same."  she was compliant with home exercise program given last session.   Response to previous treatment: same pain  Functional change: range of motion within normal limits, continues with weakened  and pinch and pain at right lateral epicondyle    Pain: 5/10  Location: right elbow      Objective     Objective Measures updated at progress report unless specified.   Observation/Appearance: pt to clinic with elbow brace intact     Sensation Test: intact     Edema. Measured in centimeters.    10/30/2023 10/30/2023     Left Right   Elbow 27 27   Wrist Crease 16 15.5      Range of Motion/Strength:  Elbow and Wrist ROM. Measured in degrees.    10/30/2023 10/30/2023  11/21/2023 12/5/2023      Left Right  Right Right    Elbow Extension/Flexion 0/130 10/135  5/135 WNL   Supination/Pronation WNL WNL WNL   WNL   Wrist Ext/Flex 58/60 48/62 60/72   WNL   Wrist RD/UD 18/35 10/35 12/35        WNL       Strength (Dynamometer Rung II) and Pinch Strength (Pinch Gauge)  Measured in pounds.    10/30/2023 10/30/2023 11/21/2023 12/5/2023     Left Right Right Right   Elbow Flexed 45 35 15 20   Elbow Extended 40 " "25*pain 15 20   Key Pinch 17 11 9 14   3pt Pinch 12 5 5 5   2pt Pinch 8 4 3 4      Special Tests: right              - Cozen's (lateral epicondylitis) test: +              - Mill's (lateral epicondylitis) test: +              - Extensor Digitorum (lateral epicondylitis) test: +              - Medial epicondylitis test: -  Intake Outcome Measure for FOTO Elbow Survey     Therapist reviewed FOTO scores for Darwin Beltrán on 10/30/2023.   FOTO documents entered into [facility] - see Media section.     Intake Score: 54%  4th visit:50%         Treatment     Darwin received the treatments listed below:     Supervised modalities after being cleared for contradictions: Hot Pack -right elbow x 5 minutes for pain management and tissue extensibility    Manual therapy techniques: Soft tissue Mobilization were applied to the: right elbow for 10 minutes, including:  -ASTYM right elbow, wrist and hand    Therapeutic exercises to develop ROM for 15 minutes, including:  -ASTYM stretches 1-3 3/30"  -elbow 3 ways range of motion 1# 2/15  -wrist 3 ways range of motion 1# 2/15  -supination/pronation 1# 2/15  -red flexbar smiles/frowns 2/15  -Scifit Ube forward/reverse Level 2 3 minutes each direction  -pom pom pickup 1 container brown gripper second setting    Neuromuscular re-education activities to improve Posture for 15 minutes. The following activities were included:  -Bilateral external rotation red band 2/15  -Bilateral horizontal abduction red 2/15    Patient Education and Home Exercises     Education provided:   - on current condition and home exercise program   - Progress towards goals     Written Home Exercises Provided: Patient instructed to cont prior HEP.  Exercises were reviewed and Darwin was able to demonstrate them prior to the end of the session.  Darwin demonstrated good  understanding of the HEP provided.   .   See EMR under Patient Instructions for exercises provided prior visit.      Assessment     Patient has attended " "5 visits of therapy for treatment right lateral elbow pain. Patient reports she continues to have moderate pain and feels that therapy has not helped her elbow with pain and strength. Her range of motion is now within normal limits grossly.  Signs and symptoms of fibrotic tissue noted during ASTYM however tolerated fairly. Continues to be positive for lateral epicondyle tests on right side.   Darwin is progressing fairly towards her goals and there are no updates to goals at this time. Pt prognosis is fair.     Pt will continue to benefit from skilled outpatient occupational therapy to address the deficits listed in the problem list on initial evaluation provide pt/family education and to maximize pt's level of independence in the home and community environment.     Anticipated barriers to occupational therapy: none    Pt's spiritual, cultural and educational needs considered and pt agreeable to plan of care and goals.    Goals:  Long Term Goals:  Goals to be met by discharge:  1) Independent with home exercise program -met  2) Pt will increase Right wrist range of motion to within functional limits grossly in order to increase functional use with home management or work related tasks by d/c. -met  3) Pt will increase right  and pinch by 5 pounds and without pain for improved performance with activities of daily living"s and IADL"s -Not met, progressing  4) Pt will decrease pain to trace or none while completing light home management tasks or work related tasks by d/c. -Not met, progressing  5) Patient will be able to achieve less than or equal to 25% on the FOTO, demonstrating overall improved functional ability with upper extremity.  (Self-care category) -Not met, progressing    Plan     Continue with OT plan of care   Updates/Grading for next session: patient to follow up with MD since she feels therapy is not helping. Patient on hold until she follows up with MD.     NAYELI Duque     "

## 2023-12-05 ENCOUNTER — CLINICAL SUPPORT (OUTPATIENT)
Dept: REHABILITATION | Facility: HOSPITAL | Age: 53
End: 2023-12-05
Payer: MEDICARE

## 2023-12-05 DIAGNOSIS — M25.60 STIFFNESS IN JOINT: ICD-10-CM

## 2023-12-05 DIAGNOSIS — M79.621 PAIN OF RIGHT UPPER ARM: Primary | ICD-10-CM

## 2023-12-05 PROCEDURE — 97112 NEUROMUSCULAR REEDUCATION: CPT | Mod: PN

## 2023-12-05 PROCEDURE — 97110 THERAPEUTIC EXERCISES: CPT | Mod: PN

## 2023-12-05 PROCEDURE — 97140 MANUAL THERAPY 1/> REGIONS: CPT | Mod: PN

## 2023-12-14 ENCOUNTER — CLINICAL SUPPORT (OUTPATIENT)
Dept: REHABILITATION | Facility: HOSPITAL | Age: 53
End: 2023-12-14
Payer: MEDICARE

## 2023-12-14 DIAGNOSIS — R53.1 DECREASED STRENGTH, ENDURANCE, AND MOBILITY: Primary | ICD-10-CM

## 2023-12-14 DIAGNOSIS — Z74.09 DECREASED STRENGTH, ENDURANCE, AND MOBILITY: Primary | ICD-10-CM

## 2023-12-14 DIAGNOSIS — R68.89 DECREASED STRENGTH, ENDURANCE, AND MOBILITY: Primary | ICD-10-CM

## 2023-12-14 PROCEDURE — 97530 THERAPEUTIC ACTIVITIES: CPT | Mod: PN

## 2023-12-14 NOTE — PROGRESS NOTES
[facility]   Physical Therapy Treatment Note / Discharge Note      Name: Darwin Beltrán  Clinic Number: [id number]    Therapy Diagnosis:   Encounter Diagnosis   Name Primary?    Decreased strength, endurance, and mobility Yes     Physician: Rachael Diaz NP    Visit Date: 12/14/2023    Physician Orders: PT Eval and Treat   Medical Diagnosis from Referral:   M79.601 (ICD-10-CM) - Right arm pain   M25.561 (ICD-10-CM) - Acute pain of right knee   Evaluation Date: 11/16/2023  Authorization Period Expiration: 1/11/2024  Plan of Care Expiration: 1/10/2024  Visit # / Visits authorized: 2/24 (+1)  FOTO: 1/5     Precautions: Standard and history of stroke        Time In: 2:00 pm  Time Out: 2:30 pm  Total Appointment Time (timed & untimed codes): 55 minutes (2 TA)    PTA Visit #: 0/5       Subjective     Patient reports: her right knee has been doing great without any pain and she feels ready to be discharged     She was not compliant with home exercise program.  Response to previous treatment: ongoing   Functional change: ongoing     Pain: 0/10  Location: right knee     Objective    (12/14/2023):  AROM: *denotes pain  right knee: (0-0-130 degrees)  left knee: (0-0-130 degrees)     L/E Strength w/ MicroFET Muscle Carlos Manuel Dynamometer Right Left Pain/Dysfunction with Movement   (approx 4 sec hold w/ max contraction)   Hip Flexion  4/5   4+/5     Hip Abduction  4/5   4+/5     Quadriceps  8.8 --> 17.8 kg   14.4 kg      Hamstrings  5.1 --> 12.1 kg   8.3 kg           Treatment     Darwin received the treatments listed below:      Therapeutic activities to improve functional performance for 30 minutes, including:  FOTO  Objective tests and measures  Outcome measures  Home exercise program overview   Questions and answers   Discharge         Patient Education and Home Exercises       Education provided:   - compliance with HEP    Written Home Exercises Provided: yes. Exercises were reviewed and Darwin  was able to demonstrate them prior to the end of the session.  Darwin demonstrated good  understanding of the education provided. See Electronic Medical Record under Patient Instructions for exercises provided during therapy sessions    Assessment     Darwin is a 53 y.o. female referred to outpatient Physical Therapy with a medical diagnosis of right knee pain. Patient responded very well to physical therapy and she is now displaying full active right knee range of motion and > 100% strength in the right lower extremity in comparison to the left lower extremity. Patient has been discharged from physical therapy.     Darwin Is progressing well towards her goals.   Patient prognosis is Good.     Patient will continue to benefit from skilled outpatient physical therapy to address the deficits listed in the problem list box on initial evaluation, provide pt/family education and to maximize pt's level of independence in the home and community environment.     Patient's spiritual, cultural and educational needs considered and pt agreeable to plan of care and goals.     Anticipated barriers to physical therapy: history of stroke    Goals:   Short Term Goals:     1. Pt will be independent with HEP supplement PT in improving functional mobility.  2. Pt will improve right LE strength to at least 75% of left LE strength as measured via MicroFet handheld dynamometer in order to improve functional mobility  3. Pt will improve right knee AROM to at least (0-0-130 degrees) in order to improve gait     Long Term Goals:  1. Pt will be independent with updated HEP supplement PT in improving functional mobility.  2. Pt will improve right LE strength to at least 90% of left LE strength as measured via MicroFet handheld dynamometer in order to improve functional mobility  3. Pt will improve FOTO knee survey score to </= 45% limited in order to demo improved functional mobility  4. Pt will perform TUG in < 12 seconds without AD in order to  demo improved gait speed  5. Pt will perform at least 12 sit to stands without UE support on 30 second sit to stand test in order to demo improved ability to perform transfers  6. Pt will improve right knee AROM to at least (0-0-130 degrees) in order to improve gait    Plan     Discharged     Jose G Etienne, PT

## 2024-08-22 ENCOUNTER — HOSPITAL ENCOUNTER (OUTPATIENT)
Dept: RADIOLOGY | Facility: HOSPITAL | Age: 54
Discharge: HOME | End: 2024-08-22
Attending: STUDENT IN AN ORGANIZED HEALTH CARE EDUCATION/TRAINING PROGRAM
Payer: MEDICARE

## 2024-08-22 DIAGNOSIS — Z12.31 ENCOUNTER FOR SCREENING MAMMOGRAM FOR BREAST CANCER: ICD-10-CM

## 2024-08-22 PROCEDURE — 77067 SCR MAMMO BI INCL CAD: CPT | Mod: TC

## 2024-08-22 PROCEDURE — 77063 BREAST TOMOSYNTHESIS BI: CPT | Mod: TC

## 2024-09-26 DIAGNOSIS — I10 ESSENTIAL HYPERTENSION: ICD-10-CM

## 2024-09-30 ENCOUNTER — PATIENT MESSAGE (OUTPATIENT)
Dept: PRIMARY CARE CLINIC | Facility: CLINIC | Age: 54
End: 2024-09-30
Payer: MEDICARE

## 2024-12-09 ENCOUNTER — TELEPHONE (OUTPATIENT)
Dept: PRIMARY CARE CLINIC | Facility: CLINIC | Age: 54
End: 2024-12-09
Payer: MEDICARE

## 2024-12-09 NOTE — TELEPHONE ENCOUNTER
----- Message from [place] sent at 12/9/2024  9:34 AM CST -----  Type:  Sooner Apoointment Request    Caller is requesting a sooner appointment.  Caller declined first available appointment listed below.  Caller will not accept being placed on the waitlist and is requesting a message be sent to doctor.  Name of Caller: Pt   When is the first available appointment? 12/19   Symptoms: congestions, flem for a week   Would the patient rather a call back or a response via [facility]? Call   Best Call Back Number:[phone]   Additional Information:

## 2025-02-10 NOTE — PROGRESS NOTES
SUBJECTIVE     Chief Complaint   Patient presents with    Annual Exam       HPI  Darwin Beltrán is a 54 y.o. female with medical diagnoses as listed in the medical history and problem list that presents for annual exam.    Pt is UTD on age appropriate CA screening.    Family, social, surgical Hx reviewed     Chronic conditions:    Hypertension:  Prescribed amlodipine 10 mg daily in the past.   Patient does not want to take medication because she prefers to do things naturally with lifestyle.      Dyslipidemia:  Due for repeat lipid panel.  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  Not currently on a blood thinner.    Vitamin-D deficiency:  Not currently on supplementation.     Health Maintenance         Date Due Completion Date    Pneumococcal Vaccines (Age 0-64) (2 - PCV) 12/07/2021 12/7/2020    COVID-19 Vaccine (4 - Booster for Moderna series) 04/19/2022 2/22/2022    Influenza Vaccine (1) Never done ---    Mammogram 03/11/2023 3/11/2022    High Dose Statin 03/09/2023 (Originally 8/15/1991) ---    Shingles Vaccine (1 of 2) 03/09/2023 (Originally 8/15/2020) ---    Hemoglobin A1c (Diabetic Prevention Screening) 02/08/2024 2/8/2021    Cervical Cancer Screening 02/08/2026 2/8/2021    Lipid Panel 02/08/2026 2/8/2021    Colorectal Cancer Screening 02/24/2026 2/24/2021    TETANUS VACCINE 02/18/2030 2/18/2020                PAST MEDICAL HISTORY:  Past Medical History:   Diagnosis Date    Chronic migraine 06/30/2021    Elevated blood pressure reading in office without diagnosis of hypertension 11/11/2020    HTN (hypertension)     Moyamoya 2011    Stroke        PAST SURGICAL HISTORY:  Past Surgical History:   Procedure Laterality Date    BILATERAL TUBAL LIGATION  2009    COLONOSCOPY N/A 2/24/2021    Procedure: COLONOSCOPY/SUPREP;  Surgeon: Jose Williamson MD;  Location: [facility];  Service: Endoscopy;  Laterality: N/A;  covid test 2/21  LYNETTE Mendoza    ORIF TIBIA & FIBULA FRACTURES Right 2004    Hardware in Place       SOCIAL HISTORY:  Social History     Socioeconomic History    Marital status:    Tobacco Use    Smoking status: Former     Current packs/day: 0.00     Average packs/day: 0.5 packs/day for 23.0 years (11.5 ttl pk-yrs)     Types: Cigarettes     Quit date: 2023     Years since quittin.7     Passive exposure: Never    Smokeless tobacco: Never   Substance and Sexual Activity    Alcohol use: Yes     Comment: occasionally (2 drinks max per occasion)    Drug use: Never    Sexual activity: Yes     Partners: Male     Birth control/protection: None   Social History Narrative    Tobacco: Initiated in 20s; 1ppd    EtOH: Occasional; few times per year; has max use of 3 drinks at once    Drug: None    Employment: Unemployed. Receives disability for medical condition ([employer])    Education: Bachelor's Degree     Lives with 3 children              FAMILY HISTORY:  Family History   Problem Relation Name Age of Onset    Breast cancer Mother  57         in 60s    Prostate cancer Father      Hypertension Father      Breast cancer Paternal Aunt      Breast cancer Paternal Grandmother      Lung cancer Brother      Colon cancer Brother         ALLERGIES AND MEDICATIONS: updated and reviewed.  Review of patient's allergies indicates:  No Known Allergies  Current Outpatient Medications   Medication Sig Dispense Refill    fluticasone propionate (FLONASE) 50 mcg/actuation nasal spray 2 sprays (100 mcg total) by Each Nostril route 2 (two) times a day. 16 g 1    losartan (COZAAR) 50 MG tablet Take 1 tablet (50 mg total) by mouth once daily. (Patient not taking: Reported on 11/15/2023) 90 tablet 3    mv-mn/iron/folic acid/herb 190 (VITAMIN D3 COMPLETE ORAL) Take 1 tablet by mouth once daily.       No current facility-administered medications for this visit.     Facility-Administered Medications Ordered in Other Visits   Medication Dose  Route Frequency Provider Last Rate Last Admin    0.9%  NaCl infusion   Intravenous Continuous Jose Williamson MD 20 mL/hr at 02/24/21 1146 New Bag at 02/24/21 1146    sodium chloride 0.9% flush 10 mL  10 mL Intravenous PRN Jose Williamson MD           ROS  Review of Systems   Constitutional:  Negative for fever and weight loss.   Respiratory:  Negative for cough and shortness of breath.    Cardiovascular:  Negative for chest pain and palpitations.   Gastrointestinal:  Negative for abdominal pain, constipation, diarrhea, nausea and vomiting.   Genitourinary:  Negative for dysuria.   Musculoskeletal:  Negative for back pain and joint pain.   Skin:  Negative for rash.   Neurological:  Negative for dizziness, weakness and headaches.   Psychiatric/Behavioral:  Negative for depression. The patient is not nervous/anxious.          OBJECTIVE     Physical Exam  Vitals:    02/11/25 1117   BP: 130/72   Pulse:       Body mass index is 29.02 kg/m².  Weight: 74.3 kg (163 lb 12.8 oz)         Physical Exam  HENT:      Head: Normocephalic and atraumatic.      Nose: Nose normal.      Mouth/Throat:      Mouth: Mucous membranes are moist.      Pharynx: Oropharynx is clear.   Eyes:      Extraocular Movements: Extraocular movements intact.      Conjunctiva/sclera: Conjunctivae normal.      Pupils: Pupils are equal, round, and reactive to light.   Cardiovascular:      Rate and Rhythm: Normal rate and regular rhythm.   Pulmonary:      Effort: Pulmonary effort is normal.   Musculoskeletal:         General: No swelling. Normal range of motion.      Cervical back: Normal range of motion.      Right lower leg: No edema.      Left lower leg: No edema.   Skin:     General: Skin is warm.      Findings: No lesion or rash.   Neurological:      General: No focal deficit present.      Mental Status: She is alert and oriented to person, place, and time.      Motor: No weakness.   Psychiatric:         Mood and Affect: Mood normal.         Thought  Content: Thought content normal.               ASSESSMENT     54 y.o. female with     1. Annual physical exam    2. Hemiplegia and hemiparesis following cerebral infarction affecting right dominant side    3. Essential hypertension    4. Borges borges disease    5. Screening for diabetes mellitus    6. Vitamin D deficiency    7. Other long term (current) drug therapy          PLAN:     1. Annual physical exam    2. Hemiplegia and hemiparesis following cerebral infarction affecting right dominant side  Overview:  Stable right-sided deficits including  hand weakness and contracture      3. Essential hypertension  Overview:  At goal    Pt defers medication        4. Borges borges disease  Overview:  Not taking blood pressure medicine or blood thinner. Understands risks      5. Screening for diabetes mellitus  -     Hemoglobin A1C; Future; Expected date: 02/11/2025    6. Vitamin D deficiency  Overview:  Not on supplementation.  Due for repeat level    Orders:  -     Vitamin D; Future; Expected date: 02/11/2025    7. Other long term (current) drug therapy  -     Hemoglobin A1C; Future; Expected date: 02/11/2025  -     TSH; Future; Expected date: 02/11/2025  -     Lipid Panel; Future; Expected date: 02/11/2025  -     Comprehensive Metabolic Panel; Future; Expected date: 02/11/2025  -     CBC Auto Differential; Future; Expected date: 02/11/2025  -     Vitamin D; Future; Expected date: 02/11/2025          Discussed age and gender appropriate screenings at this visit and encouraged a healthy diet low in simple carbohydrates, and increased physical activity.  Counseled on medically appropriate vaccines based on age and current health condition.  Screening test reviewed and discussed with patient.      RTC in 6 months     Alyse Flores MD

## 2025-02-11 ENCOUNTER — OFFICE VISIT (OUTPATIENT)
Dept: PRIMARY CARE CLINIC | Facility: CLINIC | Age: 55
End: 2025-02-11
Payer: MEDICARE

## 2025-02-11 ENCOUNTER — LAB VISIT (OUTPATIENT)
Dept: LAB | Facility: HOSPITAL | Age: 55
End: 2025-02-11
Attending: STUDENT IN AN ORGANIZED HEALTH CARE EDUCATION/TRAINING PROGRAM
Payer: MEDICARE

## 2025-02-11 VITALS
HEART RATE: 74 BPM | DIASTOLIC BLOOD PRESSURE: 72 MMHG | OXYGEN SATURATION: 97 % | SYSTOLIC BLOOD PRESSURE: 130 MMHG | BODY MASS INDEX: 29.02 KG/M2 | WEIGHT: 163.81 LBS

## 2025-02-11 DIAGNOSIS — I67.5 MOYA MOYA DISEASE: ICD-10-CM

## 2025-02-11 DIAGNOSIS — I10 ESSENTIAL HYPERTENSION: ICD-10-CM

## 2025-02-11 DIAGNOSIS — Z00.00 ANNUAL PHYSICAL EXAM: Primary | ICD-10-CM

## 2025-02-11 DIAGNOSIS — E55.9 VITAMIN D DEFICIENCY: ICD-10-CM

## 2025-02-11 DIAGNOSIS — Z79.899 OTHER LONG TERM (CURRENT) DRUG THERAPY: ICD-10-CM

## 2025-02-11 DIAGNOSIS — I69.351 HEMIPLEGIA AND HEMIPARESIS FOLLOWING CEREBRAL INFARCTION AFFECTING RIGHT DOMINANT SIDE: ICD-10-CM

## 2025-02-11 DIAGNOSIS — Z13.1 SCREENING FOR DIABETES MELLITUS: ICD-10-CM

## 2025-02-11 LAB
25(OH)D3+25(OH)D2 SERPL-MCNC: 7 NG/ML (ref 30–96)
ALBUMIN SERPL BCP-MCNC: 3.7 G/DL (ref 3.5–5.2)
ALP SERPL-CCNC: 104 U/L (ref 40–150)
ALT SERPL W/O P-5'-P-CCNC: 7 U/L (ref 10–44)
ANION GAP SERPL CALC-SCNC: 8 MMOL/L (ref 8–16)
AST SERPL-CCNC: 12 U/L (ref 10–40)
BASOPHILS # BLD AUTO: 0.07 K/UL (ref 0–0.2)
BASOPHILS NFR BLD: 0.8 % (ref 0–1.9)
BILIRUB SERPL-MCNC: 0.4 MG/DL (ref 0.1–1)
BUN SERPL-MCNC: 8 MG/DL (ref 6–20)
CALCIUM SERPL-MCNC: 9.7 MG/DL (ref 8.7–10.5)
CHLORIDE SERPL-SCNC: 106 MMOL/L (ref 95–110)
CHOLEST SERPL-MCNC: 190 MG/DL (ref 120–199)
CHOLEST/HDLC SERPL: 4.9 {RATIO} (ref 2–5)
CO2 SERPL-SCNC: 23 MMOL/L (ref 23–29)
CREAT SERPL-MCNC: 0.8 MG/DL (ref 0.5–1.4)
DIFFERENTIAL METHOD BLD: ABNORMAL
EOSINOPHIL # BLD AUTO: 0.3 K/UL (ref 0–0.5)
EOSINOPHIL NFR BLD: 3.1 % (ref 0–8)
ERYTHROCYTE [DISTWIDTH] IN BLOOD BY AUTOMATED COUNT: 13.5 % (ref 11.5–14.5)
EST. GFR  (NO RACE VARIABLE): >60 ML/MIN/1.73 M^2
ESTIMATED AVG GLUCOSE: 117 MG/DL (ref 68–131)
GLUCOSE SERPL-MCNC: 78 MG/DL (ref 70–110)
HBA1C MFR BLD: 5.7 % (ref 4–5.6)
HCT VFR BLD AUTO: 44.8 % (ref 37–48.5)
HDLC SERPL-MCNC: 39 MG/DL (ref 40–75)
HDLC SERPL: 20.5 % (ref 20–50)
HGB BLD-MCNC: 13.8 G/DL (ref 12–16)
IMM GRANULOCYTES # BLD AUTO: 0.02 K/UL (ref 0–0.04)
IMM GRANULOCYTES NFR BLD AUTO: 0.2 % (ref 0–0.5)
LDLC SERPL CALC-MCNC: 133.2 MG/DL (ref 63–159)
LYMPHOCYTES # BLD AUTO: 3.7 K/UL (ref 1–4.8)
LYMPHOCYTES NFR BLD: 42.3 % (ref 18–48)
MCH RBC QN AUTO: 28.6 PG (ref 27–31)
MCHC RBC AUTO-ENTMCNC: 30.8 G/DL (ref 32–36)
MCV RBC AUTO: 93 FL (ref 82–98)
MONOCYTES # BLD AUTO: 0.5 K/UL (ref 0.3–1)
MONOCYTES NFR BLD: 5.7 % (ref 4–15)
NEUTROPHILS # BLD AUTO: 4.1 K/UL (ref 1.8–7.7)
NEUTROPHILS NFR BLD: 47.9 % (ref 38–73)
NONHDLC SERPL-MCNC: 151 MG/DL
NRBC BLD-RTO: 0 /100 WBC
PLATELET # BLD AUTO: 372 K/UL (ref 150–450)
PMV BLD AUTO: 11 FL (ref 9.2–12.9)
POTASSIUM SERPL-SCNC: 4.3 MMOL/L (ref 3.5–5.1)
PROT SERPL-MCNC: 6.9 G/DL (ref 6–8.4)
RBC # BLD AUTO: 4.83 M/UL (ref 4–5.4)
SODIUM SERPL-SCNC: 137 MMOL/L (ref 136–145)
TRIGL SERPL-MCNC: 89 MG/DL (ref 30–150)
TSH SERPL DL<=0.005 MIU/L-ACNC: 0.9 UIU/ML (ref 0.4–4)
WBC # BLD AUTO: 8.63 K/UL (ref 3.9–12.7)

## 2025-02-11 PROCEDURE — 3075F SYST BP GE 130 - 139MM HG: CPT | Mod: CPTII,S$GLB,, | Performed by: STUDENT IN AN ORGANIZED HEALTH CARE EDUCATION/TRAINING PROGRAM

## 2025-02-11 PROCEDURE — 83036 HEMOGLOBIN GLYCOSYLATED A1C: CPT | Performed by: STUDENT IN AN ORGANIZED HEALTH CARE EDUCATION/TRAINING PROGRAM

## 2025-02-11 PROCEDURE — 1159F MED LIST DOCD IN RCRD: CPT | Mod: CPTII,S$GLB,, | Performed by: STUDENT IN AN ORGANIZED HEALTH CARE EDUCATION/TRAINING PROGRAM

## 2025-02-11 PROCEDURE — 84443 ASSAY THYROID STIM HORMONE: CPT | Performed by: STUDENT IN AN ORGANIZED HEALTH CARE EDUCATION/TRAINING PROGRAM

## 2025-02-11 PROCEDURE — 80061 LIPID PANEL: CPT | Performed by: STUDENT IN AN ORGANIZED HEALTH CARE EDUCATION/TRAINING PROGRAM

## 2025-02-11 PROCEDURE — 99214 OFFICE O/P EST MOD 30 MIN: CPT | Mod: S$GLB,,, | Performed by: STUDENT IN AN ORGANIZED HEALTH CARE EDUCATION/TRAINING PROGRAM

## 2025-02-11 PROCEDURE — 85025 COMPLETE CBC W/AUTO DIFF WBC: CPT | Performed by: STUDENT IN AN ORGANIZED HEALTH CARE EDUCATION/TRAINING PROGRAM

## 2025-02-11 PROCEDURE — 99999 PR PBB SHADOW E&M-EST. PATIENT-LVL III: CPT | Mod: PBBFAC,,, | Performed by: STUDENT IN AN ORGANIZED HEALTH CARE EDUCATION/TRAINING PROGRAM

## 2025-02-11 PROCEDURE — 80053 COMPREHEN METABOLIC PANEL: CPT | Performed by: STUDENT IN AN ORGANIZED HEALTH CARE EDUCATION/TRAINING PROGRAM

## 2025-02-11 PROCEDURE — 82306 VITAMIN D 25 HYDROXY: CPT | Performed by: STUDENT IN AN ORGANIZED HEALTH CARE EDUCATION/TRAINING PROGRAM

## 2025-02-11 PROCEDURE — 3078F DIAST BP <80 MM HG: CPT | Mod: CPTII,S$GLB,, | Performed by: STUDENT IN AN ORGANIZED HEALTH CARE EDUCATION/TRAINING PROGRAM

## 2025-02-11 PROCEDURE — 1160F RVW MEDS BY RX/DR IN RCRD: CPT | Mod: CPTII,S$GLB,, | Performed by: STUDENT IN AN ORGANIZED HEALTH CARE EDUCATION/TRAINING PROGRAM

## 2025-02-11 PROCEDURE — 36415 COLL VENOUS BLD VENIPUNCTURE: CPT | Performed by: STUDENT IN AN ORGANIZED HEALTH CARE EDUCATION/TRAINING PROGRAM

## 2025-02-11 PROCEDURE — 3008F BODY MASS INDEX DOCD: CPT | Mod: CPTII,S$GLB,, | Performed by: STUDENT IN AN ORGANIZED HEALTH CARE EDUCATION/TRAINING PROGRAM

## 2025-02-12 ENCOUNTER — PATIENT MESSAGE (OUTPATIENT)
Dept: PRIMARY CARE CLINIC | Facility: CLINIC | Age: 55
End: 2025-02-12
Payer: MEDICARE

## 2025-02-13 DIAGNOSIS — E55.9 VITAMIN D DEFICIENCY: Primary | ICD-10-CM

## 2025-02-13 RX ORDER — ERGOCALCIFEROL 1.25 MG/1
50000 CAPSULE ORAL
Qty: 12 CAPSULE | Refills: 0 | Status: SHIPPED | OUTPATIENT
Start: 2025-02-13 | End: 2025-05-02

## 2025-06-06 ENCOUNTER — PATIENT MESSAGE (OUTPATIENT)
Dept: ADMINISTRATIVE | Facility: HOSPITAL | Age: 55
End: 2025-06-06
Payer: MEDICARE

## 2025-07-29 ENCOUNTER — PATIENT MESSAGE (OUTPATIENT)
Dept: ADMINISTRATIVE | Facility: HOSPITAL | Age: 55
End: 2025-07-29
Payer: MEDICARE

## 2025-07-29 ENCOUNTER — PATIENT OUTREACH (OUTPATIENT)
Dept: ADMINISTRATIVE | Facility: HOSPITAL | Age: 55
End: 2025-07-29
Payer: MEDICARE

## 2025-07-29 DIAGNOSIS — Z12.31 SCREENING MAMMOGRAM FOR BREAST CANCER: Primary | ICD-10-CM

## 2025-08-25 ENCOUNTER — HOSPITAL ENCOUNTER (OUTPATIENT)
Dept: RADIOLOGY | Facility: HOSPITAL | Age: 55
Discharge: HOME | End: 2025-08-25
Attending: STUDENT IN AN ORGANIZED HEALTH CARE EDUCATION/TRAINING PROGRAM
Payer: MEDICARE

## 2025-08-25 VITALS — BODY MASS INDEX: 28.88 KG/M2 | HEIGHT: 63 IN | WEIGHT: 163 LBS

## 2025-08-25 DIAGNOSIS — Z12.31 SCREENING MAMMOGRAM FOR BREAST CANCER: ICD-10-CM

## 2025-08-25 PROCEDURE — 77063 BREAST TOMOSYNTHESIS BI: CPT | Mod: TC

## 2025-09-04 ENCOUNTER — TELEPHONE (OUTPATIENT)
Dept: PRIMARY CARE CLINIC | Facility: CLINIC | Age: 55
End: 2025-09-04
Payer: MEDICARE